# Patient Record
Sex: MALE | Race: BLACK OR AFRICAN AMERICAN | Employment: OTHER | URBAN - NONMETROPOLITAN AREA
[De-identification: names, ages, dates, MRNs, and addresses within clinical notes are randomized per-mention and may not be internally consistent; named-entity substitution may affect disease eponyms.]

---

## 2017-07-24 ENCOUNTER — APPOINTMENT (OUTPATIENT)
Dept: GENERAL RADIOLOGY | Age: 47
End: 2017-07-24
Payer: MEDICAID

## 2017-07-24 ENCOUNTER — HOSPITAL ENCOUNTER (EMERGENCY)
Age: 47
Discharge: HOME | End: 2017-07-24
Payer: MEDICAID

## 2017-07-24 VITALS
HEIGHT: 68 IN | BODY MASS INDEX: 22.73 KG/M2 | WEIGHT: 150 LBS | TEMPERATURE: 98.2 F | RESPIRATION RATE: 18 BRPM | OXYGEN SATURATION: 99 % | DIASTOLIC BLOOD PRESSURE: 86 MMHG | HEART RATE: 83 BPM | SYSTOLIC BLOOD PRESSURE: 156 MMHG
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
## 2017-07-24 DIAGNOSIS — M54.50 ACUTE RIGHT-SIDED LOW BACK PAIN WITHOUT SCIATICA: ICD-10-CM

## 2017-07-24 DIAGNOSIS — S83.421A SPRAIN OF LATERAL COLLATERAL LIGAMENT OF RIGHT KNEE, INITIAL ENCOUNTER: Primary | ICD-10-CM

## 2017-07-24 PROCEDURE — 6370000000 HC RX 637 (ALT 250 FOR IP): Performed by: PHYSICIAN ASSISTANT

## 2017-07-24 PROCEDURE — 99283 EMERGENCY DEPT VISIT LOW MDM: CPT

## 2017-07-24 PROCEDURE — 73564 X-RAY EXAM KNEE 4 OR MORE: CPT

## 2017-07-24 PROCEDURE — 72100 X-RAY EXAM L-S SPINE 2/3 VWS: CPT

## 2017-07-24 RX ORDER — TRAMADOL HYDROCHLORIDE 50 MG/1
50 TABLET ORAL ONCE
Status: COMPLETED | OUTPATIENT
Start: 2017-07-24 | End: 2017-07-24

## 2017-07-24 RX ORDER — CYCLOBENZAPRINE HCL 10 MG
10 TABLET ORAL 3 TIMES DAILY PRN
Qty: 12 TABLET | Refills: 0 | Status: SHIPPED | OUTPATIENT
Start: 2017-07-24 | End: 2019-01-10 | Stop reason: ALTCHOICE

## 2017-07-24 RX ORDER — ACETAMINOPHEN 325 MG/1
650 TABLET ORAL EVERY 6 HOURS PRN
Qty: 20 TABLET | Refills: 0 | Status: SHIPPED | OUTPATIENT
Start: 2017-07-24 | End: 2019-11-11 | Stop reason: SDUPTHER

## 2017-07-24 RX ADMIN — TRAMADOL HYDROCHLORIDE 50 MG: 50 TABLET, FILM COATED ORAL at 13:15

## 2017-07-24 ASSESSMENT — ENCOUNTER SYMPTOMS
STRIDOR: 0
EYE DISCHARGE: 0
DIARRHEA: 0
BACK PAIN: 1
COUGH: 0
EYE REDNESS: 0
SHORTNESS OF BREATH: 0
COLOR CHANGE: 0
RHINORRHEA: 0
FACIAL SWELLING: 0
ABDOMINAL DISTENTION: 0
NAUSEA: 0
VOMITING: 0
PHOTOPHOBIA: 0

## 2017-07-24 ASSESSMENT — PAIN DESCRIPTION - ORIENTATION: ORIENTATION: RIGHT;LOWER

## 2017-07-24 ASSESSMENT — PAIN DESCRIPTION - FREQUENCY: FREQUENCY: CONTINUOUS

## 2017-07-24 ASSESSMENT — PAIN DESCRIPTION - LOCATION: LOCATION: BACK;KNEE

## 2017-07-24 ASSESSMENT — PAIN SCALES - GENERAL
PAINLEVEL_OUTOF10: 8
PAINLEVEL_OUTOF10: 8

## 2017-07-24 ASSESSMENT — PAIN DESCRIPTION - PAIN TYPE: TYPE: ACUTE PAIN

## 2017-07-29 ASSESSMENT — ENCOUNTER SYMPTOMS: ABDOMINAL PAIN: 0

## 2017-09-20 ENCOUNTER — APPOINTMENT (OUTPATIENT)
Dept: GENERAL RADIOLOGY | Age: 47
End: 2017-09-20
Payer: MEDICAID

## 2017-09-20 ENCOUNTER — HOSPITAL ENCOUNTER (EMERGENCY)
Age: 47
Discharge: HOME | End: 2017-09-20
Attending: EMERGENCY MEDICINE
Payer: MEDICAID

## 2017-09-20 VITALS
HEIGHT: 68 IN | SYSTOLIC BLOOD PRESSURE: 154 MMHG | RESPIRATION RATE: 16 BRPM | WEIGHT: 150 LBS | HEART RATE: 87 BPM | BODY MASS INDEX: 22.73 KG/M2 | OXYGEN SATURATION: 100 % | TEMPERATURE: 97.9 F | DIASTOLIC BLOOD PRESSURE: 83 MMHG

## 2017-09-20 DIAGNOSIS — S93.402A SPRAIN OF LEFT ANKLE, UNSPECIFIED LIGAMENT, INITIAL ENCOUNTER: ICD-10-CM

## 2017-09-20 DIAGNOSIS — S20.211A RIB CONTUSION, RIGHT, INITIAL ENCOUNTER: ICD-10-CM
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
## 2017-09-20 PROCEDURE — 6370000000 HC RX 637 (ALT 250 FOR IP): Performed by: EMERGENCY MEDICINE

## 2017-09-20 PROCEDURE — 99284 EMERGENCY DEPT VISIT MOD MDM: CPT

## 2017-09-20 PROCEDURE — 73610 X-RAY EXAM OF ANKLE: CPT

## 2017-09-20 PROCEDURE — 71101 X-RAY EXAM UNILAT RIBS/CHEST: CPT

## 2017-09-20 RX ORDER — TRAMADOL HYDROCHLORIDE 50 MG/1
50 TABLET ORAL EVERY 6 HOURS PRN
Qty: 10 TABLET | Refills: 0 | Status: SHIPPED | OUTPATIENT
Start: 2017-09-20 | End: 2018-03-11 | Stop reason: DRUGHIGH

## 2017-09-20 RX ORDER — ACETAMINOPHEN 325 MG/1
650 TABLET ORAL ONCE
Status: COMPLETED | OUTPATIENT
Start: 2017-09-20 | End: 2017-09-20

## 2017-09-20 RX ADMIN — ACETAMINOPHEN 650 MG: 325 TABLET ORAL at 12:31

## 2017-09-20 ASSESSMENT — PAIN DESCRIPTION - LOCATION: LOCATION: BACK;ANKLE

## 2017-09-20 ASSESSMENT — PAIN SCALES - GENERAL: PAINLEVEL_OUTOF10: 8

## 2017-09-20 ASSESSMENT — PAIN DESCRIPTION - PAIN TYPE: TYPE: ACUTE PAIN

## 2017-11-28 ENCOUNTER — APPOINTMENT (OUTPATIENT)
Dept: GENERAL RADIOLOGY | Age: 47
End: 2017-11-28
Payer: MEDICAID

## 2017-11-28 ENCOUNTER — HOSPITAL ENCOUNTER (EMERGENCY)
Age: 47
Discharge: HOME | End: 2017-11-28
Payer: MEDICAID

## 2017-11-28 VITALS
BODY MASS INDEX: 22.73 KG/M2 | DIASTOLIC BLOOD PRESSURE: 82 MMHG | HEART RATE: 74 BPM | HEIGHT: 68 IN | OXYGEN SATURATION: 96 % | WEIGHT: 150 LBS | SYSTOLIC BLOOD PRESSURE: 133 MMHG | RESPIRATION RATE: 16 BRPM | TEMPERATURE: 98.2 F

## 2017-11-28 DIAGNOSIS — S90.851A FOREIGN BODY IN RIGHT FOOT, INITIAL ENCOUNTER: ICD-10-CM

## 2017-11-28 DIAGNOSIS — T14.8XXA PUNCTURE WOUND: Primary | ICD-10-CM

## 2017-11-28 DIAGNOSIS — S91.331A PUNCTURE WOUND OF RIGHT FOOT, INITIAL ENCOUNTER: ICD-10-CM

## 2017-11-28 PROCEDURE — 73620 X-RAY EXAM OF FOOT: CPT

## 2017-11-28 PROCEDURE — 99284 EMERGENCY DEPT VISIT MOD MDM: CPT

## 2017-11-28 PROCEDURE — 2500000003 HC RX 250 WO HCPCS

## 2017-11-28 PROCEDURE — 73650 X-RAY EXAM OF HEEL: CPT

## 2017-11-28 PROCEDURE — 10120 INC&RMVL FB SUBQ TISS SMPL: CPT

## 2017-11-28 PROCEDURE — L3260 AMBULATORY SURGICAL BOOT EAC: HCPCS

## 2017-11-28 PROCEDURE — 90715 TDAP VACCINE 7 YRS/> IM: CPT | Performed by: NURSE PRACTITIONER

## 2017-11-28 PROCEDURE — A6446 CONFORM BAND S W>=3" <5"/YD: HCPCS

## 2017-11-28 PROCEDURE — 90471 IMMUNIZATION ADMIN: CPT | Performed by: NURSE PRACTITIONER

## 2017-11-28 PROCEDURE — 6370000000 HC RX 637 (ALT 250 FOR IP): Performed by: NURSE PRACTITIONER

## 2017-11-28 PROCEDURE — 6360000002 HC RX W HCPCS: Performed by: NURSE PRACTITIONER

## 2017-11-28 PROCEDURE — 73630 X-RAY EXAM OF FOOT: CPT

## 2017-11-28 RX ORDER — CIPROFLOXACIN 500 MG/1
500 TABLET, FILM COATED ORAL 2 TIMES DAILY
Qty: 20 TABLET | Refills: 0 | Status: SHIPPED | OUTPATIENT
Start: 2017-11-28 | End: 2017-12-08

## 2017-11-28 RX ORDER — HYDROCODONE BITARTRATE AND ACETAMINOPHEN 5; 325 MG/1; MG/1
1 TABLET ORAL ONCE
Status: COMPLETED | OUTPATIENT
Start: 2017-11-28 | End: 2017-11-28

## 2017-11-28 RX ORDER — HYDROCODONE BITARTRATE AND ACETAMINOPHEN 5; 325 MG/1; MG/1
1 TABLET ORAL EVERY 6 HOURS PRN
Qty: 12 TABLET | Refills: 0 | Status: SHIPPED | OUTPATIENT
Start: 2017-11-28 | End: 2017-12-05

## 2017-11-28 RX ORDER — LIDOCAINE HYDROCHLORIDE AND EPINEPHRINE 10; 10 MG/ML; UG/ML
20 INJECTION, SOLUTION INFILTRATION; PERINEURAL ONCE
Status: COMPLETED | OUTPATIENT
Start: 2017-11-28 | End: 2017-11-28

## 2017-11-28 RX ORDER — LIDOCAINE HYDROCHLORIDE AND EPINEPHRINE 10; 10 MG/ML; UG/ML
INJECTION, SOLUTION INFILTRATION; PERINEURAL
Status: COMPLETED
Start: 2017-11-28 | End: 2017-11-28

## 2017-11-28 RX ADMIN — HYDROCODONE BITARTRATE AND ACETAMINOPHEN 1 TABLET: 5; 325 TABLET ORAL at 09:54

## 2017-11-28 RX ADMIN — LIDOCAINE HYDROCHLORIDE AND EPINEPHRINE: 10; 10 INJECTION, SOLUTION INFILTRATION; PERINEURAL at 12:30

## 2017-11-28 RX ADMIN — TETANUS TOXOID, REDUCED DIPHTHERIA TOXOID AND ACELLULAR PERTUSSIS VACCINE, ADSORBED 0.5 ML: 5; 2.5; 8; 8; 2.5 SUSPENSION INTRAMUSCULAR at 09:54

## 2017-11-28 ASSESSMENT — PAIN SCALES - GENERAL
PAINLEVEL_OUTOF10: 4
PAINLEVEL_OUTOF10: 8
PAINLEVEL_OUTOF10: 8

## 2017-11-28 ASSESSMENT — ENCOUNTER SYMPTOMS
RHINORRHEA: 0
CHEST TIGHTNESS: 0
SORE THROAT: 0
ABDOMINAL DISTENTION: 0
NAUSEA: 0
SHORTNESS OF BREATH: 0
BACK PAIN: 0
COUGH: 0
PHOTOPHOBIA: 0
VOICE CHANGE: 0
COLOR CHANGE: 0
ABDOMINAL PAIN: 0
SINUS PRESSURE: 0
EYE REDNESS: 0
BLOOD IN STOOL: 0
VOMITING: 0
CONSTIPATION: 0
DIARRHEA: 0
WHEEZING: 0

## 2017-11-28 ASSESSMENT — PAIN DESCRIPTION - DESCRIPTORS: DESCRIPTORS: STABBING;TENDER

## 2017-11-28 ASSESSMENT — PAIN DESCRIPTION - LOCATION: LOCATION: FOOT

## 2017-11-28 ASSESSMENT — PAIN DESCRIPTION - PAIN TYPE: TYPE: ACUTE PAIN

## 2017-11-28 ASSESSMENT — PAIN DESCRIPTION - ORIENTATION: ORIENTATION: RIGHT

## 2017-11-28 NOTE — CONSULTS
Podiatric Surgery Consult    CC:  Puncture wound right foot    HPI:  The patient is a 56 y.o. male with significant past medical history of GERD and HTN who being seen at bedside this afternoon. Patient also relates past history of DM type II, however he states that has resolved following significant weight loss. Patient relates that he stepped on a large metal screw last night. Relates that the screw went through his shoes and into his foot. States that at the time of the incident he pulled the screw out. Relates continued pain at the site of injury. Denies Drainage, redness, swelling, F,C,N,V, SOB,CP. Past Medical History:        Diagnosis Date    GERD (gastroesophageal reflux disease)     Headache(784.0)     Hyperlipidemia     Hypertension        Past Surgical History:        Procedure Laterality Date    MANDIBLE FRACTURE SURGERY         Current Medications:    Current Facility-Administered Medications: lidocaine-EPINEPHrine 1 percent-1:[id number] injection, , ,     Allergies:  Aspirin and Motrin [ibuprofen micronized]    Social History:    Relates 28 year pack history. Relates alcohol and marijuana use    Family History:       Problem Relation Age of Onset    Heart Disease Mother     Diabetes Mother     Hypertension Mother        REVIEW OF SYSTEMS:    Constitutional: Negative for fever, chills, and sudden weight loss or gain. HEENT: Negative for blurry/double vision, ears, nose, or throat pain. Negative for mass. Respiratory: Negative for shortness of breath or hemoptysis. Cardiovascular: Negative for angina, palpitations, or lower extremity edema. Gastrointestinal: Negative for nausea, vomiting, diarrhea, constipation, or abdominal pain. Genitourinary: Negative for increased urination, burning with urination, or hematuria. Musculoskeletal: See above HPI. Integument: See above HPI. Hematology: Negative for easy bruising or easy bleeding.     Physical Examination:  General: Awake, alert, and (gastroesophageal reflux disease)    Hyperlipidemia    Hypertension       Plan  1. Patient initially examined and evaluated. Puncture wound right heel  - pt received TBAP in ED  Foreign body right heel  - reviewed radiographs. Discuss planned bedside incision and drainage with removal of foreign body. Patient is in agreement and surgical consent signed. - Metallic marker placed on puncture site to allow triangulation of foreign body on x-ray. - 20cc of 1% lidocaine with epi used for field block  - right foot prepped in usual sterile manner. Linear incision made over puncture wound. Blunt dissection with hemostat down to the level of the foreign body. Small metallic foreign body removed. Surgical site flushed with copious amounts of sterile NS. Closed with 4.0 vicryl and 4.0 prolene. Dressed with betadine gauze, DSD and ace bandage  - d/c on cirpo  - NWB with crutches  - follow up with Dr. Ericka Torres in 1 week     Dr. Driss Mckeon thank you for the consultation, allowing podiatry to assist in the medical welfare of this patient. Podiatry will continue to follow this patient throughout the duration of hospitalization.      Amelia Cuba, PGY-2  Foot and Ankle Surgical Resident  11/28/2017 11:31 AM

## 2017-11-28 NOTE — ED NOTES
Patient presents to ED with wife. States he was outside in his neighbors yard and a piece that holds the fan blades together went into the heel of his foot. He pulled it out himself. There is a small hole on the bottom of his foot. Some blood noted on his socks but wound is not bleeding at this time. Patient resting in bed Dora Townsend NP to assess.       Rudy López RN  11/28/17 047

## 2017-11-28 NOTE — ED NOTES
Dr Annette Zamora doing a procedure in patient room to remove object      Alicia Gottlieb RN  11/28/17 6513

## 2017-11-28 NOTE — ED PROVIDER NOTES
[facility] Emergency Department    CHIEF COMPLAINT       Chief Complaint   Patient presents with    Foot Injury       Nurses Notes reviewed and I agree except as noted in the HPI. HISTORY OF PRESENT ILLNESS    Kelli Baker is a 56 y.o. male who presents to the ED for evaluation of Right foot pain. Patient apparently stepped on a metal object last evening. It went through the sole of his shoe and up into his foot. He pulled the metal object out. He states it's a bolt second next to a ceiling fan that was in his neighbor's yard. He had mild bleeding after this was removed but no bleeding is noted currently. He denies any fever. He does have a significant medical history that includes diabetes. But he states he has been taken off of his antidiabetic medicines. He notes he has significant pain to the base of his foot in the medial aspect of his foot. He has no significant decreased range of motion. He has taken no medicines for this. He denies chest pain, shortness of breath, nausea, vomiting, headache, lightheadedness. Pain description:  Onset: Yesterday  Location: Right foot  Duration: Constant  Character: Sharp/soreness  Aggravating factors: Movement and touch  Radiation: Up for  Severity: 8 out of 10    Experienced previously: Denies    HPI was provided by the patient. REVIEW OF SYSTEMS     Review of Systems   Constitutional: Negative for appetite change, chills, diaphoresis, fatigue, fever and unexpected weight change. HENT: Negative for congestion, hearing loss, postnasal drip, rhinorrhea, sinus pressure, sore throat and voice change. Eyes: Negative for photophobia, redness and visual disturbance. Respiratory: Negative for cough, chest tightness, shortness of breath and wheezing. Cardiovascular: Negative for chest pain and palpitations. Gastrointestinal: Negative for abdominal distention, abdominal pain, blood in stool, constipation, diarrhea, nausea and vomiting. Genitourinary: Negative for decreased urine volume, difficulty urinating, dysuria, flank pain and frequency. Musculoskeletal: Positive for arthralgias (right ankle). Negative for back pain, gait problem, joint swelling, neck pain and neck stiffness. Skin: Negative for color change and rash. Allergic/Immunologic: Negative for immunocompromised state. Neurological: Negative for dizziness, tremors, weakness, light-headedness, numbness and headaches. Hematological: Does not bruise/bleed easily. Psychiatric/Behavioral: Negative for behavioral problems, confusion and decreased concentration. The patient is not nervous/anxious. PAST MEDICAL HISTORY    has a past medical history of GERD (gastroesophageal reflux disease); Headache(784.0); Hyperlipidemia; and Hypertension. SURGICAL HISTORY      has a past surgical history that includes Mandible fracture surgery. CURRENT MEDICATIONS       Discharge Medication List as of 11/28/2017  1:05 PM      CONTINUE these medications which have NOT CHANGED    Details   traMADol (ULTRAM) 50 MG tablet Take 1 tablet by mouth every 6 hours as needed for Pain, Disp-10 tablet, R-0Print      cyclobenzaprine (FLEXERIL) 10 MG tablet Take 1 tablet by mouth 3 times daily as needed for Muscle spasms, Disp-12 tablet, R-0Print      acetaminophen (TYLENOL) 325 MG tablet Take 2 tablets by mouth every 6 hours as needed for Pain, Disp-20 tablet, R-0Print      !! ONE TOUCH ULTRA TEST strip TEST once daily, Disp-100 strip, R-3Normal      lidocaine (LIDODERM) 5 % Place 1 patch onto the skin every 24 hours 12 hours on, 12 hours off., Disp-6 patch, R-0      loratadine (CLARITIN) 10 MG tablet Take 1 tablet by mouth daily, Disp-14 tablet, R-0      omeprazole (PRILOSEC) 20 MG delayed release capsule take 1 capsule by mouth once daily, Disp-30 capsule, R-5      Skin Protectants, Misc. (EUCERIN) cream Apply topically as needed. , Disp-1 Package, R-3, Normal      atorvastatin (LIPITOR) 40 MG tablet take 1 tablet by mouth once daily, Disp-30 tablet, R-6      lisinopril (PRINIVIL;ZESTRIL) 10 MG tablet take 1 tablet by mouth once daily, Disp-30 tablet, R-3      !! ACCU-CHEK TRIXIE PLUS strip take as directed once daily and BEFORE MEALS IF NEEDED, Disp-100 strip, R-3DX E11.9       ! ! - Potential duplicate medications found. Please discuss with provider. ALLERGIES     is allergic to aspirin and motrin [ibuprofen micronized]. FAMILY HISTORY     indicated that his mother is alive. He indicated that his father is alive. He indicated that the status of his sister is unknown.    family history includes Diabetes in his mother; Heart Disease in his mother; Hypertension in his mother. SOCIAL HISTORY      reports that he has been smoking Cigarettes. He has a 5.60 pack-year smoking history. He has never used smokeless tobacco. He reports that he drinks alcohol. He reports that he uses drugs, including Marijuana. PHYSICAL EXAM     INITIAL VITALS:  height is 5' 8\" (1.727 m) and weight is 150 lb (68 kg). His oral temperature is 98.2 °F (36.8 °C). His blood pressure is 133/82 and his pulse is 74. His respiration is 16 and oxygen saturation is 96%. Physical Exam   Constitutional: He is oriented to person, place, and time. He appears well-developed and well-nourished. HENT:   Head: Normocephalic. Nose: Nose normal.   Mouth/Throat: Uvula is midline and oropharynx is clear and moist.   Eyes: Conjunctivae and EOM are normal. Pupils are equal, round, and reactive to light. Neck: Normal range of motion. Neck supple. Cardiovascular: Normal rate, regular rhythm, S1 normal, S2 normal, normal heart sounds and intact distal pulses. Pulmonary/Chest: Effort normal and breath sounds normal. No respiratory distress. He exhibits no tenderness. Abdominal: Soft. Normal appearance and bowel sounds are normal. He exhibits no distension. There is no tenderness. Musculoskeletal: Normal range of motion. recognition software. It may contain minor errors which are inherent in voice recognition technology. **      Final report electronically signed by Dr. Fareed Buchanan on 11/28/2017 11:48 AM      XR FOOT RIGHT (MIN 3 VIEWS)   Final Result   4 x 1 mm linear density projected along the plantar aspect of the plantar fascia, best demonstrated on the lateral view, of uncertain etiology or significance but not present on the previous study of 7/15/2009. **This report has been created using voice recognition software. It may contain minor errors which are inherent in voice recognition technology. **      Final report electronically signed by Dr. Fareed Buchanan on 11/28/2017 10:11 AM            LABS:   Labs Reviewed - No data to display    EMERGENCY DEPARTMENT COURSE:   Vitals:    Vitals:    11/28/17 0943   BP: 133/82   Pulse: 74   Resp: 16   Temp: 98.2 °F (36.8 °C)   TempSrc: Oral   SpO2: 96%   Weight: 150 lb (68 kg)   Height: 5' 8\" (1.727 m)         MDM    Patient seen and evaluated in the emergency department, patient appears to be in no acute distress. His pulse wound noted to the right foot noted at the sole of the foot. There is edema noted tracking up into the medial malleolus. He has no significant decreased range of motion of the foot, sensory and motor is intact. X-ray was obtained of the foot to rule out retained foreign body. Xray shows possible Foreign body in the plantar fascia. I discussed the case with Dr. Alejandra Sanchez of the podiatry service he had one of his residents come down and evaluate the patient. They removed the foreign body. Patient was given crutches, pain medicine, and started on Cipro for Pseudomonas coverage as well. They are advised to follow-up with Dr. Alejandra Sanchez and the next week. All here in the emergency room and maintained stable course appropriate for discharge.         Medications   HYDROcodone-acetaminophen (NORCO) 5-325 MG per tablet 1 tablet (1 tablet Oral Given 11/28/17

## 2017-11-29 ENCOUNTER — HOSPITAL ENCOUNTER (EMERGENCY)
Age: 47
Discharge: HOME | End: 2017-11-29
Payer: MEDICAID

## 2017-11-29 VITALS
TEMPERATURE: 97.9 F | HEART RATE: 88 BPM | RESPIRATION RATE: 12 BRPM | DIASTOLIC BLOOD PRESSURE: 77 MMHG | BODY MASS INDEX: 21.48 KG/M2 | HEIGHT: 69 IN | WEIGHT: 145 LBS | SYSTOLIC BLOOD PRESSURE: 145 MMHG | OXYGEN SATURATION: 100 %

## 2017-11-29 DIAGNOSIS — Z51.89 ENCOUNTER FOR WOUND RE-CHECK: Primary | ICD-10-CM

## 2017-11-29 PROCEDURE — A6446 CONFORM BAND S W>=3" <5"/YD: HCPCS

## 2017-11-29 PROCEDURE — 99282 EMERGENCY DEPT VISIT SF MDM: CPT

## 2017-11-29 PROCEDURE — A6252 ABSORPT DRG >16 <=48 W/O BDR: HCPCS

## 2017-11-29 PROCEDURE — A6402 STERILE GAUZE <= 16 SQ IN: HCPCS

## 2017-11-29 ASSESSMENT — ENCOUNTER SYMPTOMS
SINUS PRESSURE: 0
PHOTOPHOBIA: 0
COLOR CHANGE: 0
VOMITING: 0
RHINORRHEA: 0
NAUSEA: 0
ABDOMINAL DISTENTION: 0
VOICE CHANGE: 0
EYE REDNESS: 0
COUGH: 0
SORE THROAT: 0
DIARRHEA: 0
SHORTNESS OF BREATH: 0
CHEST TIGHTNESS: 0
BLOOD IN STOOL: 0
WHEEZING: 0
BACK PAIN: 0
CONSTIPATION: 0
ABDOMINAL PAIN: 0

## 2017-11-29 ASSESSMENT — PAIN DESCRIPTION - LOCATION: LOCATION: FOOT

## 2017-11-29 ASSESSMENT — PAIN DESCRIPTION - ORIENTATION: ORIENTATION: RIGHT

## 2017-11-29 ASSESSMENT — PAIN DESCRIPTION - PAIN TYPE: TYPE: ACUTE PAIN

## 2017-11-29 ASSESSMENT — PAIN SCALES - GENERAL: PAINLEVEL_OUTOF10: 7

## 2017-11-29 NOTE — ED PROVIDER NOTES
[address] [facility]       Chief Complaint   Patient presents with    Wound Check       Nurses Notes reviewed and I agree except as noted in the HPI. HISTORY OF PRESENT ILLNESS    Manasa Oneil is a 56 y.o. male who presents to the ED for evaluation of wound check. Patient notes that he recently stepped on metal object which resulted in him having a foreign body in his right foot, which was removed via podiatry. Patient notes that he has been having increasing drainage from his wound, and subsequently came back to the ED for evaluation. Patient notes that he has been taking his antibiotic as directed. Denies new pain. Patient denies any fevers, chills, nausea, vomiting or diarrhea. Patient denies any chest pain or shortness of breath. Denies numbness, tingling, or paraesthesias. Denies any pain. Patient denies any further complaints at initial encounter. HPI was provided by the patient. REVIEW OF SYSTEMS     Review of Systems   Constitutional: Negative for appetite change, chills, diaphoresis, fatigue, fever and unexpected weight change. HENT: Negative for congestion, hearing loss, postnasal drip, rhinorrhea, sinus pressure, sore throat and voice change. Eyes: Negative for photophobia, redness and visual disturbance. Respiratory: Negative for cough, chest tightness, shortness of breath and wheezing. Cardiovascular: Negative for chest pain and palpitations. Gastrointestinal: Negative for abdominal distention, abdominal pain, blood in stool, constipation, diarrhea, nausea and vomiting. Endocrine: Negative for cold intolerance, heat intolerance, polydipsia, polyphagia and polyuria. Genitourinary: Negative for decreased urine volume, difficulty urinating, dysuria, flank pain and frequency. Musculoskeletal: Negative for arthralgias, back pain, gait problem, joint swelling, neck pain and neck stiffness. Skin: Positive for wound (Drainage).  Negative for atorvastatin (LIPITOR) 40 MG tablet take 1 tablet by mouth once daily, Disp-30 tablet, R-6      lisinopril (PRINIVIL;ZESTRIL) 10 MG tablet take 1 tablet by mouth once daily, Disp-30 tablet, R-3      !! ACCU-CHEK TRIXIE PLUS strip take as directed once daily and BEFORE MEALS IF NEEDED, Disp-100 strip, R-3DX E11.9       ! ! - Potential duplicate medications found. Please discuss with provider. ALLERGIES     is allergic to aspirin and motrin [ibuprofen micronized]. FAMILY HISTORY     indicated that his mother is alive. He indicated that his father is alive. He indicated that the status of his sister is unknown.    family history includes Diabetes in his mother; Heart Disease in his mother; Hypertension in his mother. SOCIAL HISTORY      reports that he has been smoking Cigarettes. He has a 5.60 pack-year smoking history. He has never used smokeless tobacco. He reports that he drinks alcohol. He reports that he uses drugs, including Marijuana. PHYSICAL EXAM     INITIAL VITALS:  height is 5' 9\" (1.753 m) and weight is 145 lb (65.8 kg). His oral temperature is 97.9 °F (36.6 °C). His blood pressure is 145/77 (abnormal) and his pulse is 88. His respiration is 12 and oxygen saturation is 100%. Physical Exam   Constitutional: He is oriented to person, place, and time. He appears well-developed and well-nourished. HENT:   Head: Normocephalic. Right Ear: External ear normal.   Left Ear: External ear normal.   Nose: Nose normal.   Mouth/Throat: Uvula is midline and oropharynx is clear and moist.   Eyes: Conjunctivae and EOM are normal. Pupils are equal, round, and reactive to light. Neck: Normal range of motion. Neck supple. Cardiovascular: Normal rate, regular rhythm, S1 normal, S2 normal, normal heart sounds and intact distal pulses. Pulmonary/Chest: Effort normal and breath sounds normal. No respiratory distress. He exhibits no tenderness. Abdominal: Soft.  Normal appearance and bowel sounds follow up with Dr. Mook Donnelly as discussed. Anticipatory guidance was given. The patient is instructed to return to the emergency department for any worsening or otherwise change in their symptoms. Patient discharged from the emergency department in good condition with all questions answered. See disposition below. CRITICAL CARE:   None    CONSULTS:  None    PROCEDURES:  None    FINAL IMPRESSION      1. Encounter for wound re-check          DISPOSITION/PLAN   Patient was discharged in stable condition. Will return if symptoms change or worsen, or for any sign or symptom deemed emergent by the patient or family members. Follow up as an outpatient, sooner if symptoms warrant. PATIENT REFERRED TO:  PRAKASH Sanchez [address]  [facility] [address] [facility]  [phone]    Go to   For your scheduled appointment      DISCHARGE MEDICATIONS:  Discharge Medication List as of 11/29/2017 12:48 PM          (Please note that portions of this note were completed with a voice recognition program.  Efforts were made to edit the dictations but occasionally words are mis-transcribed.)    Scribe: Tyrone Reynolds 11/29/17 12:10 PM Scribing for and in the presence of Steven Carpenter CNP. Signed by: Clyde Paredes, 11/29/17 5:14 PM    Provider:  I personally performed the services described in the documentation, reviewed and edited the documentation which was dictated to the scribe in my presence, and it accurately records my words and actions.     Steven Carpenter CNP 11/29/17 5:14 PM    BENNETT Zuleta NP  11/29/17 1524

## 2018-03-11 ENCOUNTER — APPOINTMENT (OUTPATIENT)
Dept: CT IMAGING | Age: 48
End: 2018-03-11
Payer: MEDICAID

## 2018-03-11 ENCOUNTER — HOSPITAL ENCOUNTER (EMERGENCY)
Age: 48
Discharge: HOME | End: 2018-03-11
Attending: FAMILY MEDICINE
Payer: MEDICAID

## 2018-03-11 ENCOUNTER — APPOINTMENT (OUTPATIENT)
Dept: GENERAL RADIOLOGY | Age: 48
End: 2018-03-11
Payer: MEDICAID

## 2018-03-11 VITALS
OXYGEN SATURATION: 100 % | RESPIRATION RATE: 16 BRPM | BODY MASS INDEX: 21.48 KG/M2 | HEIGHT: 69 IN | HEART RATE: 77 BPM | WEIGHT: 145 LBS | SYSTOLIC BLOOD PRESSURE: 152 MMHG | TEMPERATURE: 97.9 F | DIASTOLIC BLOOD PRESSURE: 91 MMHG

## 2018-03-11 DIAGNOSIS — Z87.898 HISTORY OF NIGHT SWEATS: ICD-10-CM

## 2018-03-11 DIAGNOSIS — S46.911A STRAIN OF RIGHT SHOULDER, INITIAL ENCOUNTER: ICD-10-CM

## 2018-03-11 DIAGNOSIS — M79.671 RIGHT FOOT PAIN: Primary | ICD-10-CM

## 2018-03-11 LAB
ALBUMIN SERPL-MCNC: 4.4 G/DL (ref 3.5–5.1)
ALP BLD-CCNC: 129 U/L (ref 38–126)
ALT SERPL-CCNC: 18 U/L (ref 11–66)
ANION GAP SERPL CALCULATED.3IONS-SCNC: 14 MEQ/L (ref 8–16)
ANISOCYTOSIS: ABNORMAL
AST SERPL-CCNC: 24 U/L (ref 5–40)
BASOPHILS # BLD: 1 %
BASOPHILS ABSOLUTE: 0.1 THOU/MM3 (ref 0–0.1)
BILIRUB SERPL-MCNC: 0.2 MG/DL (ref 0.3–1.2)
BILIRUBIN DIRECT: < 0.2 MG/DL (ref 0–0.3)
BUN BLDV-MCNC: 17 MG/DL (ref 7–22)
CALCIUM SERPL-MCNC: 9.4 MG/DL (ref 8.5–10.5)
CHLORIDE BLD-SCNC: 97 MEQ/L (ref 98–111)
CO2: 26 MEQ/L (ref 23–33)
CREAT SERPL-MCNC: 1.1 MG/DL (ref 0.4–1.2)
EOSINOPHIL # BLD: 1.7 %
EOSINOPHILS ABSOLUTE: 0.1 THOU/MM3 (ref 0–0.4)
GFR SERPL CREATININE-BSD FRML MDRD: 87 ML/MIN/1.73M2
GLUCOSE BLD-MCNC: 94 MG/DL (ref 70–108)
HCT VFR BLD CALC: 41.9 % (ref 42–52)
HEMOGLOBIN: 13.5 GM/DL (ref 14–18)
HYPOCHROMIA: ABNORMAL
LACTIC ACID: 1.7 MMOL/L (ref 0.5–2.2)
LYMPHOCYTES # BLD: 20.2 %
LYMPHOCYTES ABSOLUTE: 1.5 THOU/MM3 (ref 1–4.8)
MCH RBC QN AUTO: 25.1 PG (ref 27–31)
MCHC RBC AUTO-ENTMCNC: 32.3 GM/DL (ref 33–37)
MCV RBC AUTO: 77.7 FL (ref 80–94)
MICROCYTES: ABNORMAL
MONOCYTES # BLD: 8.8 %
MONOCYTES ABSOLUTE: 0.6 THOU/MM3 (ref 0.4–1.3)
NUCLEATED RED BLOOD CELLS: 0 /100 WBC
OSMOLALITY CALCULATION: 275.1 MOSMOL/KG (ref 275–300)
PDW BLD-RTO: 16.1 % (ref 11.5–14.5)
PLATELET # BLD: 326 THOU/MM3 (ref 130–400)
PMV BLD AUTO: 8.1 FL (ref 7.4–10.4)
POTASSIUM SERPL-SCNC: 4.6 MEQ/L (ref 3.5–5.2)
PROCALCITONIN: 0.03 NG/ML (ref 0.01–0.09)
RBC # BLD: 5.4 MILL/MM3 (ref 4.7–6.1)
SEG NEUTROPHILS: 68.3 %
SEGMENTED NEUTROPHILS ABSOLUTE COUNT: 4.9 THOU/MM3 (ref 1.8–7.7)
SODIUM BLD-SCNC: 137 MEQ/L (ref 135–145)
TOTAL PROTEIN: 7.2 G/DL (ref 6.1–8)
WBC # BLD: 7.2 THOU/MM3 (ref 4.8–10.8)

## 2018-03-11 PROCEDURE — 85025 COMPLETE CBC W/AUTO DIFF WBC: CPT

## 2018-03-11 PROCEDURE — 73630 X-RAY EXAM OF FOOT: CPT

## 2018-03-11 PROCEDURE — 84145 PROCALCITONIN (PCT): CPT

## 2018-03-11 PROCEDURE — 87040 BLOOD CULTURE FOR BACTERIA: CPT

## 2018-03-11 PROCEDURE — 99284 EMERGENCY DEPT VISIT MOD MDM: CPT

## 2018-03-11 PROCEDURE — 36415 COLL VENOUS BLD VENIPUNCTURE: CPT

## 2018-03-11 PROCEDURE — 80053 COMPREHEN METABOLIC PANEL: CPT

## 2018-03-11 PROCEDURE — 83605 ASSAY OF LACTIC ACID: CPT

## 2018-03-11 PROCEDURE — 6370000000 HC RX 637 (ALT 250 FOR IP): Performed by: FAMILY MEDICINE

## 2018-03-11 PROCEDURE — 73200 CT UPPER EXTREMITY W/O DYE: CPT

## 2018-03-11 PROCEDURE — 82248 BILIRUBIN DIRECT: CPT

## 2018-03-11 PROCEDURE — L3260 AMBULATORY SURGICAL BOOT EAC: HCPCS

## 2018-03-11 RX ORDER — TRAMADOL HYDROCHLORIDE 50 MG/1
50 TABLET ORAL ONCE
Status: COMPLETED | OUTPATIENT
Start: 2018-03-11 | End: 2018-03-11

## 2018-03-11 RX ORDER — TRAMADOL HYDROCHLORIDE 50 MG/1
50 TABLET ORAL EVERY 8 HOURS PRN
Qty: 15 TABLET | Refills: 0 | Status: SHIPPED | OUTPATIENT
Start: 2018-03-11 | End: 2018-03-16

## 2018-03-11 RX ADMIN — TRAMADOL HYDROCHLORIDE 50 MG: 50 TABLET, FILM COATED ORAL at 11:23

## 2018-03-11 ASSESSMENT — ENCOUNTER SYMPTOMS
SHORTNESS OF BREATH: 0
VOMITING: 0
NAUSEA: 0
ABDOMINAL PAIN: 0
COUGH: 0
DIARRHEA: 0
WHEEZING: 0
STRIDOR: 0
CHEST TIGHTNESS: 0
BACK PAIN: 0
COLOR CHANGE: 0

## 2018-03-11 ASSESSMENT — PAIN DESCRIPTION - LOCATION: LOCATION: FOOT;SHOULDER

## 2018-03-11 ASSESSMENT — PAIN SCALES - GENERAL: PAINLEVEL_OUTOF10: 7

## 2018-03-11 ASSESSMENT — PAIN DESCRIPTION - PAIN TYPE: TYPE: ACUTE PAIN

## 2018-03-11 ASSESSMENT — PAIN DESCRIPTION - ORIENTATION: ORIENTATION: RIGHT
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
## 2018-03-11 NOTE — ED NOTES
Patient updated on plan of care. Patient returned from [address] [place] [address] and post op boot placed on patient.       608 Radha KONG RN  03/11/18 3314

## 2018-03-11 NOTE — ED NOTES
Patient presents to the ED with girlfriend. Patient states his right foot hurts from when he had a nail removed in his foot. Patient states recently he fell and has shoulder pain from falling in the past and thinks he needs a MRI.       [address] [place] [address] JEANNIE KONG  03/11/18 8999

## 2018-03-17 LAB
BLOOD CULTURE, ROUTINE: NORMAL
BLOOD CULTURE, ROUTINE: NORMAL

## 2018-05-24 ENCOUNTER — HOSPITAL ENCOUNTER (EMERGENCY)
Age: 48
Discharge: HOME | End: 2018-05-24
Payer: MEDICAID

## 2018-05-24 ENCOUNTER — APPOINTMENT (OUTPATIENT)
Dept: GENERAL RADIOLOGY | Age: 48
End: 2018-05-24
Payer: MEDICAID

## 2018-05-24 VITALS
HEIGHT: 69 IN | TEMPERATURE: 97.9 F | SYSTOLIC BLOOD PRESSURE: 176 MMHG | BODY MASS INDEX: 22.96 KG/M2 | OXYGEN SATURATION: 98 % | RESPIRATION RATE: 18 BRPM | DIASTOLIC BLOOD PRESSURE: 99 MMHG | WEIGHT: 155 LBS | HEART RATE: 85 BPM

## 2018-05-24 DIAGNOSIS — S46.911A STRAIN OF RIGHT SHOULDER, INITIAL ENCOUNTER: Primary | ICD-10-CM

## 2018-05-24 PROCEDURE — 99283 EMERGENCY DEPT VISIT LOW MDM: CPT

## 2018-05-24 PROCEDURE — A4565 SLINGS: HCPCS

## 2018-05-24 PROCEDURE — 73030 X-RAY EXAM OF SHOULDER: CPT

## 2018-05-24 PROCEDURE — 6370000000 HC RX 637 (ALT 250 FOR IP): Performed by: NURSE PRACTITIONER

## 2018-05-24 RX ORDER — NAPROXEN 500 MG/1
500 TABLET ORAL 2 TIMES DAILY WITH MEALS
Qty: 30 TABLET | Refills: 0 | Status: ON HOLD | OUTPATIENT
Start: 2018-05-24 | End: 2022-02-02 | Stop reason: HOSPADM

## 2018-05-24 RX ORDER — HYDROCODONE BITARTRATE AND ACETAMINOPHEN 5; 325 MG/1; MG/1
1 TABLET ORAL ONCE
Status: COMPLETED | OUTPATIENT
Start: 2018-05-24 | End: 2018-05-24

## 2018-05-24 RX ADMIN — HYDROCODONE BITARTRATE AND ACETAMINOPHEN 1 TABLET: 5; 325 TABLET ORAL at 13:21

## 2018-05-24 ASSESSMENT — ENCOUNTER SYMPTOMS
SORE THROAT: 0
VOMITING: 0
ABDOMINAL PAIN: 0
SHORTNESS OF BREATH: 0
EYE DISCHARGE: 0
COUGH: 0
BACK PAIN: 0
WHEEZING: 0
NAUSEA: 0
DIARRHEA: 0
EYE REDNESS: 0
RHINORRHEA: 0

## 2018-05-24 ASSESSMENT — PAIN DESCRIPTION - LOCATION: LOCATION: SHOULDER

## 2018-05-24 ASSESSMENT — PAIN DESCRIPTION - ORIENTATION: ORIENTATION: RIGHT

## 2018-05-24 ASSESSMENT — PAIN SCALES - GENERAL
PAINLEVEL_OUTOF10: 10
PAINLEVEL_OUTOF10: 10

## 2018-05-24 ASSESSMENT — ACTIVITIES OF DAILY LIVING (ADL): EFFECT OF PAIN ON DAILY ACTIVITIES: WORSE WITH MOVEMENT

## 2018-05-24 ASSESSMENT — PAIN DESCRIPTION - PAIN TYPE: TYPE: ACUTE PAIN

## 2018-05-24 ASSESSMENT — PAIN DESCRIPTION - FREQUENCY: FREQUENCY: CONTINUOUS

## 2018-12-06 ENCOUNTER — HOSPITAL ENCOUNTER (EMERGENCY)
Age: 48
Discharge: HOME | End: 2018-12-06
Payer: MEDICAID

## 2018-12-06 ENCOUNTER — APPOINTMENT (OUTPATIENT)
Dept: GENERAL RADIOLOGY | Age: 48
End: 2018-12-06
Payer: MEDICAID

## 2018-12-06 VITALS
RESPIRATION RATE: 14 BRPM | HEART RATE: 96 BPM | HEIGHT: 69 IN | OXYGEN SATURATION: 99 % | TEMPERATURE: 97.9 F | WEIGHT: 155 LBS | DIASTOLIC BLOOD PRESSURE: 96 MMHG | BODY MASS INDEX: 22.96 KG/M2 | SYSTOLIC BLOOD PRESSURE: 171 MMHG

## 2018-12-06 DIAGNOSIS — R03.0 ELEVATED BLOOD PRESSURE READING: ICD-10-CM

## 2018-12-06 DIAGNOSIS — S39.012A BACK STRAIN, INITIAL ENCOUNTER: ICD-10-CM

## 2018-12-06 DIAGNOSIS — M25.511 ACUTE PAIN OF RIGHT SHOULDER: Primary | ICD-10-CM

## 2018-12-06 PROCEDURE — 6370000000 HC RX 637 (ALT 250 FOR IP): Performed by: EMERGENCY MEDICINE

## 2018-12-06 PROCEDURE — 73030 X-RAY EXAM OF SHOULDER: CPT

## 2018-12-06 PROCEDURE — 99283 EMERGENCY DEPT VISIT LOW MDM: CPT

## 2018-12-06 RX ORDER — CYCLOBENZAPRINE HCL 10 MG
10 TABLET ORAL ONCE
Status: COMPLETED | OUTPATIENT
Start: 2018-12-06 | End: 2018-12-06

## 2018-12-06 RX ORDER — ACETAMINOPHEN 500 MG
1000 TABLET ORAL ONCE
Status: COMPLETED | OUTPATIENT
Start: 2018-12-06 | End: 2018-12-06

## 2018-12-06 RX ADMIN — ACETAMINOPHEN 1000 MG: 500 TABLET, FILM COATED ORAL at 14:25

## 2018-12-06 RX ADMIN — CYCLOBENZAPRINE HYDROCHLORIDE 10 MG: 10 TABLET, FILM COATED ORAL at 14:25

## 2018-12-06 ASSESSMENT — PAIN SCALES - GENERAL: PAINLEVEL_OUTOF10: 8

## 2018-12-06 ASSESSMENT — ENCOUNTER SYMPTOMS
VOMITING: 0
DIARRHEA: 0
SHORTNESS OF BREATH: 0
BACK PAIN: 1
EYE DISCHARGE: 0
NAUSEA: 0
COUGH: 0
RHINORRHEA: 0
ABDOMINAL PAIN: 0
EYE REDNESS: 0
SORE THROAT: 0
WHEEZING: 0

## 2018-12-06 ASSESSMENT — PAIN DESCRIPTION - PAIN TYPE: TYPE: ACUTE PAIN

## 2018-12-06 ASSESSMENT — PAIN DESCRIPTION - LOCATION: LOCATION: SHOULDER

## 2018-12-06 ASSESSMENT — PAIN DESCRIPTION - FREQUENCY: FREQUENCY: CONTINUOUS

## 2018-12-06 ASSESSMENT — PAIN DESCRIPTION - DESCRIPTORS: DESCRIPTORS: ACHING

## 2018-12-06 ASSESSMENT — PAIN DESCRIPTION - ORIENTATION: ORIENTATION: RIGHT

## 2018-12-06 NOTE — ED PROVIDER NOTES
[facility]  eMERGENCY dEPARTMENT eNCOUnter          [address] [facility]       Chief Complaint   Patient presents with    Shoulder Pain     right    Back Pain     lower       Nurses Notes reviewed and I agree except as noted in the HPI. HISTORY OF PRESENT ILLNESS    Siena Birmingham is a 53 y.o. male who presents to the Emergency Department for the evaluation of right should and lower back pain. Patient states he slipped and fell 1 week ago. There was no pain directly after the fall, but patient reports pain the next morning. The pain has since gotten progressively worse. Patient states it feels as thought his shoulder \"locks up\" with some movements. Patient denies incontinence, fever, and IV drug use. There are no other complaints at this time. The HPI was provided by the patient. REVIEW OF SYSTEMS     Review of Systems   Constitutional: Negative for appetite change, chills, fatigue and fever. HENT: Negative for congestion, ear pain, rhinorrhea and sore throat. Eyes: Negative for discharge, redness and visual disturbance. Respiratory: Negative for cough, shortness of breath and wheezing. Cardiovascular: Negative for chest pain, palpitations and leg swelling. Gastrointestinal: Negative for abdominal pain, diarrhea, nausea and vomiting. Genitourinary: Negative for decreased urine volume, difficulty urinating, dysuria and hematuria. Musculoskeletal: Positive for back pain (lower) and myalgias (right shoulder). Negative for arthralgias, joint swelling and neck pain. Skin: Negative for pallor and rash. Allergic/Immunologic: Negative for environmental allergies. Neurological: Negative for dizziness, syncope, weakness, light-headedness, numbness and headaches. Hematological: Negative for adenopathy. Psychiatric/Behavioral: Negative for confusion and suicidal ideas. The patient is not nervous/anxious.         PAST MEDICAL HISTORY    has a past medical history of GERD (gastroesophageal reflux disease); Headache(784.0); Hyperlipidemia; and Hypertension. SURGICAL HISTORY      has a past surgical history that includes Mandible fracture surgery. CURRENT MEDICATIONS       Discharge Medication List as of 12/6/2018  2:44 PM      CONTINUE these medications which have NOT CHANGED    Details   naproxen (NAPROSYN) 500 MG tablet Take 1 tablet by mouth 2 times daily (with meals) for 30 doses, Disp-30 tablet, R-0Print      cyclobenzaprine (FLEXERIL) 10 MG tablet Take 1 tablet by mouth 3 times daily as needed for Muscle spasms, Disp-12 tablet, R-0Print      acetaminophen (TYLENOL) 325 MG tablet Take 2 tablets by mouth every 6 hours as needed for Pain, Disp-20 tablet, R-0Print      !! ONE TOUCH ULTRA TEST strip TEST once daily, Disp-100 strip, R-3Normal      lidocaine (LIDODERM) 5 % Place 1 patch onto the skin every 24 hours 12 hours on, 12 hours off., Disp-6 patch, R-0      loratadine (CLARITIN) 10 MG tablet Take 1 tablet by mouth daily, Disp-14 tablet, R-0      omeprazole (PRILOSEC) 20 MG delayed release capsule take 1 capsule by mouth once daily, Disp-30 capsule, R-5      Skin Protectants, Misc. (EUCERIN) cream Apply topically as needed. , Disp-1 Package, R-3, Normal      atorvastatin (LIPITOR) 40 MG tablet take 1 tablet by mouth once daily, Disp-30 tablet, R-6      lisinopril (PRINIVIL;ZESTRIL) 10 MG tablet take 1 tablet by mouth once daily, Disp-30 tablet, R-3      !! ACCU-CHEK TRIXIE PLUS strip take as directed once daily and BEFORE MEALS IF NEEDED, Disp-100 strip, R-3DX E11.9       ! ! - Potential duplicate medications found. Please discuss with provider. ALLERGIES     is allergic to aspirin and motrin [ibuprofen micronized]. FAMILY HISTORY     indicated that his mother is alive. He indicated that his father is alive.  He indicated that the status of his sister is unknown.    family history includes Diabetes in his mother; Heart Disease in his mother; Hypertension in his

## 2018-12-27 ENCOUNTER — HOSPITAL ENCOUNTER (EMERGENCY)
Age: 48
Discharge: HOME | End: 2018-12-27
Payer: MEDICAID

## 2018-12-27 ENCOUNTER — APPOINTMENT (OUTPATIENT)
Dept: GENERAL RADIOLOGY | Age: 48
End: 2018-12-27
Payer: MEDICAID

## 2018-12-27 VITALS
DIASTOLIC BLOOD PRESSURE: 82 MMHG | HEART RATE: 83 BPM | OXYGEN SATURATION: 96 % | HEIGHT: 69 IN | RESPIRATION RATE: 18 BRPM | TEMPERATURE: 98 F | WEIGHT: 176 LBS | BODY MASS INDEX: 26.07 KG/M2 | SYSTOLIC BLOOD PRESSURE: 153 MMHG

## 2018-12-27 DIAGNOSIS — M25.561 ACUTE PAIN OF RIGHT KNEE: Primary | ICD-10-CM

## 2018-12-27 DIAGNOSIS — R09.81 NASAL CONGESTION: ICD-10-CM

## 2018-12-27 PROCEDURE — 73564 X-RAY EXAM KNEE 4 OR MORE: CPT

## 2018-12-27 PROCEDURE — 2709999900 HC NON-CHARGEABLE SUPPLY

## 2018-12-27 PROCEDURE — 99283 EMERGENCY DEPT VISIT LOW MDM: CPT

## 2018-12-27 RX ORDER — METHYLPREDNISOLONE 4 MG/1
TABLET ORAL
Qty: 1 KIT | Refills: 0 | Status: SHIPPED | OUTPATIENT
Start: 2018-12-27 | End: 2019-01-02

## 2018-12-27 ASSESSMENT — ENCOUNTER SYMPTOMS
EYE DISCHARGE: 0
VOMITING: 0
SORE THROAT: 0
COUGH: 0
RHINORRHEA: 0
DIARRHEA: 0
EYE REDNESS: 0
BACK PAIN: 0
WHEEZING: 0
SHORTNESS OF BREATH: 0
ABDOMINAL PAIN: 0
NAUSEA: 0

## 2018-12-27 ASSESSMENT — PAIN DESCRIPTION - DESCRIPTORS: DESCRIPTORS: SHARP

## 2018-12-27 ASSESSMENT — PAIN DESCRIPTION - LOCATION: LOCATION: KNEE

## 2018-12-27 ASSESSMENT — PAIN SCALES - GENERAL: PAINLEVEL_OUTOF10: 8

## 2019-01-10 ENCOUNTER — HOSPITAL ENCOUNTER (EMERGENCY)
Age: 49
Discharge: HOME | End: 2019-01-10
Attending: EMERGENCY MEDICINE
Payer: MEDICAID

## 2019-01-10 VITALS
HEART RATE: 98 BPM | TEMPERATURE: 98 F | SYSTOLIC BLOOD PRESSURE: 132 MMHG | WEIGHT: 185 LBS | DIASTOLIC BLOOD PRESSURE: 92 MMHG | HEIGHT: 68 IN | BODY MASS INDEX: 28.04 KG/M2 | RESPIRATION RATE: 20 BRPM | OXYGEN SATURATION: 99 %

## 2019-01-10 DIAGNOSIS — J06.9 ACUTE UPPER RESPIRATORY INFECTION: Primary | ICD-10-CM

## 2019-01-10 PROCEDURE — 99283 EMERGENCY DEPT VISIT LOW MDM: CPT

## 2019-01-10 RX ORDER — LORATADINE 10 MG/1
10 TABLET ORAL DAILY
Qty: 20 TABLET | Refills: 0 | Status: SHIPPED | OUTPATIENT
Start: 2019-01-10

## 2019-01-10 RX ORDER — FLUTICASONE PROPIONATE 50 MCG
1 SPRAY, SUSPENSION (ML) NASAL DAILY
Qty: 1 BOTTLE | Refills: 0 | Status: SHIPPED | OUTPATIENT
Start: 2019-01-10

## 2019-01-10 ASSESSMENT — ENCOUNTER SYMPTOMS
COUGH: 1
ABDOMINAL PAIN: 0
VOMITING: 0
NAUSEA: 0
DIARRHEA: 0
EYE DISCHARGE: 0
RHINORRHEA: 1
WHEEZING: 0
SHORTNESS OF BREATH: 0
SORE THROAT: 0

## 2019-01-23 ENCOUNTER — HOSPITAL ENCOUNTER (EMERGENCY)
Age: 49
Discharge: HOME | End: 2019-01-23
Payer: MEDICAID

## 2019-01-23 ENCOUNTER — APPOINTMENT (OUTPATIENT)
Dept: GENERAL RADIOLOGY | Age: 49
End: 2019-01-23
Payer: MEDICAID

## 2019-01-23 VITALS
RESPIRATION RATE: 19 BRPM | TEMPERATURE: 99 F | OXYGEN SATURATION: 99 % | HEART RATE: 98 BPM | DIASTOLIC BLOOD PRESSURE: 97 MMHG | SYSTOLIC BLOOD PRESSURE: 156 MMHG

## 2019-01-23 DIAGNOSIS — G89.29 CHRONIC PAIN OF RIGHT KNEE: ICD-10-CM
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
## 2019-01-23 DIAGNOSIS — K02.9 PAIN DUE TO DENTAL CARIES: Primary | ICD-10-CM

## 2019-01-23 DIAGNOSIS — M25.561 CHRONIC PAIN OF RIGHT KNEE: ICD-10-CM

## 2019-01-23 DIAGNOSIS — T69.9XXA: ICD-10-CM

## 2019-01-23 PROCEDURE — 73564 X-RAY EXAM KNEE 4 OR MORE: CPT

## 2019-01-23 PROCEDURE — 99283 EMERGENCY DEPT VISIT LOW MDM: CPT

## 2019-01-23 ASSESSMENT — PAIN DESCRIPTION - DESCRIPTORS: DESCRIPTORS: ACHING

## 2019-01-23 ASSESSMENT — PAIN DESCRIPTION - ORIENTATION: ORIENTATION: RIGHT

## 2019-01-23 ASSESSMENT — ENCOUNTER SYMPTOMS
VOMITING: 0
NAUSEA: 0
ABDOMINAL PAIN: 0
SORE THROAT: 0
EYE REDNESS: 0
BACK PAIN: 0
DIARRHEA: 0
COUGH: 0
WHEEZING: 0
RHINORRHEA: 0
EYE DISCHARGE: 0
SHORTNESS OF BREATH: 0

## 2019-01-23 ASSESSMENT — PAIN DESCRIPTION - LOCATION: LOCATION: KNEE

## 2019-01-23 ASSESSMENT — PAIN DESCRIPTION - FREQUENCY: FREQUENCY: CONTINUOUS

## 2019-01-23 ASSESSMENT — PAIN DESCRIPTION - PAIN TYPE: TYPE: ACUTE PAIN

## 2019-11-11 ENCOUNTER — HOSPITAL ENCOUNTER (EMERGENCY)
Age: 49
Discharge: HOME | End: 2019-11-11
Payer: MEDICARE

## 2019-11-11 VITALS
BODY MASS INDEX: 23.79 KG/M2 | RESPIRATION RATE: 12 BRPM | DIASTOLIC BLOOD PRESSURE: 85 MMHG | HEART RATE: 73 BPM | TEMPERATURE: 98.1 F | OXYGEN SATURATION: 100 % | SYSTOLIC BLOOD PRESSURE: 145 MMHG | WEIGHT: 157 LBS | HEIGHT: 68 IN

## 2019-11-11 DIAGNOSIS — K08.89 PAIN, DENTAL: Primary | ICD-10-CM

## 2019-11-11 PROCEDURE — 6370000000 HC RX 637 (ALT 250 FOR IP): Performed by: EMERGENCY MEDICINE

## 2019-11-11 PROCEDURE — 99282 EMERGENCY DEPT VISIT SF MDM: CPT

## 2019-11-11 RX ORDER — PENICILLIN V POTASSIUM 500 MG/1
500 TABLET ORAL 4 TIMES DAILY
Qty: 28 TABLET | Refills: 0 | Status: SHIPPED | OUTPATIENT
Start: 2019-11-11 | End: 2019-11-18

## 2019-11-11 RX ORDER — ACETAMINOPHEN 325 MG/1
650 TABLET ORAL ONCE
Status: COMPLETED | OUTPATIENT
Start: 2019-11-11 | End: 2019-11-11

## 2019-11-11 RX ORDER — TRAMADOL HYDROCHLORIDE 50 MG/1
50 TABLET ORAL ONCE
Status: COMPLETED | OUTPATIENT
Start: 2019-11-11 | End: 2019-11-11

## 2019-11-11 RX ORDER — LIDOCAINE HYDROCHLORIDE 20 MG/ML
5 SOLUTION OROPHARYNGEAL PRN
Qty: 1 BOTTLE | Refills: 0 | Status: ON HOLD | OUTPATIENT
Start: 2019-11-11 | End: 2022-02-02 | Stop reason: HOSPADM

## 2019-11-11 RX ORDER — ACETAMINOPHEN 325 MG/1
650 TABLET ORAL EVERY 6 HOURS PRN
Qty: 20 TABLET | Refills: 0 | Status: SHIPPED | OUTPATIENT
Start: 2019-11-11 | End: 2021-04-04 | Stop reason: SDUPTHER

## 2019-11-11 RX ORDER — LIDOCAINE HYDROCHLORIDE 20 MG/ML
5 SOLUTION OROPHARYNGEAL
Status: DISCONTINUED | OUTPATIENT
Start: 2019-11-11 | End: 2019-11-11 | Stop reason: HOSPADM

## 2019-11-11 RX ADMIN — ACETAMINOPHEN 650 MG: 325 TABLET ORAL at 15:19

## 2019-11-11 RX ADMIN — TRAMADOL HYDROCHLORIDE 50 MG: 50 TABLET ORAL at 15:19

## 2019-11-11 RX ADMIN — LIDOCAINE HYDROCHLORIDE 5 ML: 20 SOLUTION ORAL; TOPICAL at 15:19

## 2019-11-11 ASSESSMENT — PAIN DESCRIPTION - PAIN TYPE: TYPE: ACUTE PAIN

## 2019-11-11 ASSESSMENT — PAIN SCALES - GENERAL
PAINLEVEL_OUTOF10: 8
PAINLEVEL_OUTOF10: 8

## 2019-11-11 ASSESSMENT — ENCOUNTER SYMPTOMS
ABDOMINAL DISTENTION: 0
SINUS PAIN: 0
ABDOMINAL PAIN: 0
SHORTNESS OF BREATH: 0
BACK PAIN: 0
FACIAL SWELLING: 0

## 2019-11-11 ASSESSMENT — PAIN DESCRIPTION - DESCRIPTORS: DESCRIPTORS: ACHING

## 2019-11-11 ASSESSMENT — PAIN DESCRIPTION - FREQUENCY: FREQUENCY: CONTINUOUS

## 2019-11-11 ASSESSMENT — PAIN DESCRIPTION - ORIENTATION: ORIENTATION: LEFT;RIGHT;LOWER

## 2019-11-11 ASSESSMENT — PAIN DESCRIPTION - LOCATION: LOCATION: TEETH

## 2021-04-04 ENCOUNTER — HOSPITAL ENCOUNTER (EMERGENCY)
Age: 51
Discharge: HOME | End: 2021-04-04
Attending: EMERGENCY MEDICINE
Payer: MEDICARE

## 2021-04-04 VITALS
WEIGHT: 170 LBS | OXYGEN SATURATION: 100 % | RESPIRATION RATE: 16 BRPM | SYSTOLIC BLOOD PRESSURE: 148 MMHG | DIASTOLIC BLOOD PRESSURE: 95 MMHG | TEMPERATURE: 97.7 F | HEART RATE: 70 BPM | BODY MASS INDEX: 25.85 KG/M2

## 2021-04-04 DIAGNOSIS — K08.89 PAIN, DENTAL: Primary | ICD-10-CM

## 2021-04-04 DIAGNOSIS — R22.0 FACIAL SWELLING: ICD-10-CM

## 2021-04-04 PROCEDURE — 6370000000 HC RX 637 (ALT 250 FOR IP): Performed by: EMERGENCY MEDICINE

## 2021-04-04 PROCEDURE — 99283 EMERGENCY DEPT VISIT LOW MDM: CPT

## 2021-04-04 RX ORDER — PENICILLIN V POTASSIUM 250 MG/1
500 TABLET ORAL ONCE
Status: COMPLETED | OUTPATIENT
Start: 2021-04-04 | End: 2021-04-04

## 2021-04-04 RX ORDER — ACETAMINOPHEN 500 MG
1000 TABLET ORAL ONCE
Status: DISCONTINUED | OUTPATIENT
Start: 2021-04-04 | End: 2021-04-04

## 2021-04-04 RX ORDER — PENICILLIN V POTASSIUM 500 MG/1
500 TABLET ORAL 4 TIMES DAILY
Qty: 28 TABLET | Refills: 0 | Status: SHIPPED | OUTPATIENT
Start: 2021-04-04 | End: 2021-04-11

## 2021-04-04 RX ORDER — ACETAMINOPHEN 500 MG
1000 TABLET ORAL ONCE
Status: COMPLETED | OUTPATIENT
Start: 2021-04-04 | End: 2021-04-04

## 2021-04-04 RX ORDER — ACETAMINOPHEN 325 MG/1
650 TABLET ORAL EVERY 6 HOURS PRN
Qty: 120 TABLET | Refills: 3 | Status: SHIPPED | OUTPATIENT
Start: 2021-04-04 | End: 2022-04-12

## 2021-04-04 RX ADMIN — PENICILLIN V POTASSIUM 500 MG: 250 TABLET ORAL at 14:47

## 2021-04-04 RX ADMIN — ACETAMINOPHEN 1000 MG: 500 TABLET, FILM COATED ORAL at 14:47

## 2021-04-04 ASSESSMENT — PAIN DESCRIPTION - FREQUENCY: FREQUENCY: CONTINUOUS

## 2021-04-04 ASSESSMENT — PAIN DESCRIPTION - DESCRIPTORS: DESCRIPTORS: THROBBING

## 2021-04-04 ASSESSMENT — ENCOUNTER SYMPTOMS: FACIAL SWELLING: 1

## 2021-04-04 NOTE — ED PROVIDER NOTES
Mother         SOCIAL HISTORY       Social History     Socioeconomic History    Marital status: Single     Spouse name: Not on file    Number of children: Not on file    Years of education: Not on file    Highest education level: Not on file   Occupational History    Not on file   Social Needs    Financial resource strain: Not on file    Food insecurity     Worry: Not on file     Inability: Not on file    Transportation needs     Medical: Not on file     Non-medical: Not on file   Tobacco Use    Smoking status: Current Every Day Smoker     Packs/day: 0.20     Years: 28.00     Pack years: 5.60     Types: Cigarettes    Smokeless tobacco: Never Used   Substance and Sexual Activity    Alcohol use: Yes     Alcohol/week: 0.0 standard drinks     Comment: Spends $39. a day on beer (49 ozs) pt states he quit a month ago, pt denies     Drug use: Yes     Types: Marijuana     Comment:  pt denies quit a month ago     Sexual activity: Not on file   Lifestyle    Physical activity     Days per week: Not on file     Minutes per session: Not on file    Stress: Not on file   Relationships    Social connections     Talks on phone: Not on file     Gets together: Not on file     Attends Cheondo[employer] service: Not on file     Active member of club or organization: Not on file     Attends meetings of clubs or organizations: Not on file     Relationship status: Not on file    Intimate partner violence     Fear of current or ex partner: Not on file     Emotionally abused: Not on file     Physically abused: Not on file     Forced sexual activity: Not on file   Other Topics Concern    Not on file   Social History Narrative    Not on file       REVIEW OF SYSTEMS     Review of Systems   Constitutional: Negative for chills and fever. HENT: Positive for dental problem and facial swelling. Except as noted above the remainder of the review of systems was reviewed and is.    PHYSICAL EXAM    (up to 7 for level 4, 8 or more for level 5)     ED Triage Vitals [04/04/21 1354]   BP Temp Temp Source Pulse Resp SpO2 Height Weight   (!) 148/95 97.7 °F (36.5 °C) Oral 70 16 100 % -- 170 lb (77.1 kg)       Physical Exam  Vitals signs and nursing note reviewed. Constitutional:       Appearance: He is well-developed. HENT:      Head: Normocephalic. Mouth/Throat:      Comments: Mild right-sided facial swelling, mostly in the right lower premolar area. No ANUG, no Fabrice's, no trismus. Soft floor the mouth. No discernible abscess. Eyes:      Conjunctiva/sclera: Conjunctivae normal.      Pupils: Pupils are equal, round, and reactive to light. Neck:      Musculoskeletal: Neck supple. Trachea: No tracheal deviation. Pulmonary:      Effort: Pulmonary effort is normal.   Musculoskeletal: Normal range of motion. Skin:     General: Skin is warm and dry. Neurological:      Mental Status: He is alert and oriented to person, place, and time. Cranial Nerves: No cranial nerve deficit. DIAGNOSTIC RESULTS     EKG:(none if blank)  All EKG's are [facility] who either signs or Co-signs this chart in the absence of a cardiologist.        RADIOLOGY: (none if blank)   Interpretation per the Radiologistbelow, if available at the time of this note:    No orders to display       LABS:  Labs Reviewed - No data to display    All other labs were within normal range or not returned as of this dictation. Please note, any cultures that may have been sent were not resulted at the time of this patient visit.     EMERGENCY DEPARTMENT COURSE andMedical Decision Making:     MDM/   The patient presents with complaint of pain which was evaluated for concerns of serious pathology that included:   Dillon's Angina   Retropharyngeal abscess   Epiglottitis   Peritonsillar abscess   Strep pharyngitis   Foreign body    Patient noted not to have any of the previous etiology and will be discharged home with follow up.  The patient understands that at this time there is no evidence for a more malignant underlying process, but the patient also understands that early in the process of an illness or injury, an emergency department workup can be falsely reassuring. Routine discharge counseling was given, and the patient understands that worsening, changing or persistent symptoms should prompt an immediate call or follow up with their primary physician or return to the emergency department. The importance of appropriate follow up was also discussed. More extensive discharge instructions were given in the patients discharge paperwork. Strict returnprecautions and follow up instructions were discussed with the patient with which the patient agrees      The patient was evaluated during the COVID-19 pandemic. The patient was screened today during their presentation for commonly recognized symptoms and signs of COVID-19 infection. Their evaluation, treatment and testing was consistent with current guidelines for patients who present with complaints or symptoms that may be related to COVID-19. ED Medications administered this visit:    Medications   penicillin v potassium (VEETID) tablet 500 mg (has no administration in time range)   acetaminophen (TYLENOL) tablet 1,000 mg (has no administration in time range)         Procedures: (None if blank)       CLINICAL       1. Pain, dental    2.  Facial swelling          DISPOSITION/PLAN   DISPOSITION Discharge - Pending Orders Complete 04/04/2021 02:40:33 PM      PATIENT REFERRED TO:  Your dentist  in 1-2 days  In 2 days        DISCHARGE MEDICATIONS:  New Prescriptions    ACETAMINOPHEN (AMINOFEN) 325 MG TABLET    Take 2 tablets by mouth every 6 hours as needed for Pain    PENICILLIN V POTASSIUM (VEETID) 500 MG TABLET    Take 1 tablet by mouth 4 times daily for 7 days              (Please note that portions of this note were completed with a voice recognition program.  Efforts were made to edit the dictations but occasionallywords are mis-transcribed.)      Hattie Bobby DO, FACEP (electronically signed)  Attending Physician, Emergency [address], DO  04/04/21 6473

## 2021-04-04 NOTE — ED NOTES
Pt to ED for c/o R sided facial swelling and dental pain. Pt had part of a tooth break off last week and \"thought I could wait it out\" pt states attempting to get into [address] [place] [address] dental clinic but \"they keep pushing that back and back. \" Denies taking anything for pain.       Patience Portillo RN  04/04/21 7322

## 2021-09-28 ENCOUNTER — APPOINTMENT (OUTPATIENT)
Dept: ULTRASOUND IMAGING | Age: 51
End: 2021-09-28
Payer: MEDICARE

## 2021-09-28 ENCOUNTER — HOSPITAL ENCOUNTER (EMERGENCY)
Age: 51
Discharge: HOME | End: 2021-09-28
Payer: MEDICARE

## 2021-09-28 VITALS
HEART RATE: 65 BPM | DIASTOLIC BLOOD PRESSURE: 85 MMHG | WEIGHT: 174 LBS | OXYGEN SATURATION: 99 % | BODY MASS INDEX: 26.46 KG/M2 | RESPIRATION RATE: 16 BRPM | SYSTOLIC BLOOD PRESSURE: 157 MMHG | TEMPERATURE: 98.3 F

## 2021-09-28 DIAGNOSIS — I10 ESSENTIAL HYPERTENSION: ICD-10-CM

## 2021-09-28 DIAGNOSIS — Z76.0 ENCOUNTER FOR MEDICATION REFILL: ICD-10-CM

## 2021-09-28 DIAGNOSIS — R59.0 INGUINAL LYMPHADENOPATHY: Primary | ICD-10-CM

## 2021-09-28 LAB
ANION GAP SERPL CALCULATED.3IONS-SCNC: 12 MEQ/L (ref 8–16)
BASOPHILS # BLD: 2.9 %
BASOPHILS ABSOLUTE: 0.2 THOU/MM3 (ref 0–0.1)
BILIRUBIN URINE: NEGATIVE
BLOOD, URINE: NEGATIVE
BUN BLDV-MCNC: 20 MG/DL (ref 7–22)
CALCIUM SERPL-MCNC: 9.3 MG/DL (ref 8.5–10.5)
CHARACTER, URINE: CLEAR
CHLORIDE BLD-SCNC: 104 MEQ/L (ref 98–111)
CO2: 24 MEQ/L (ref 23–33)
COLOR: YELLOW
CREAT SERPL-MCNC: 1.1 MG/DL (ref 0.4–1.2)
EOSINOPHIL # BLD: 6.4 %
EOSINOPHILS ABSOLUTE: 0.5 THOU/MM3 (ref 0–0.4)
ERYTHROCYTE [DISTWIDTH] IN BLOOD BY AUTOMATED COUNT: 15.9 % (ref 11.5–14.5)
ERYTHROCYTE [DISTWIDTH] IN BLOOD BY AUTOMATED COUNT: 44.8 FL (ref 35–45)
GFR SERPL CREATININE-BSD FRML MDRD: 86 ML/MIN/1.73M2
GLUCOSE BLD-MCNC: 117 MG/DL (ref 70–108)
GLUCOSE URINE: NEGATIVE MG/DL
HCT VFR BLD CALC: 42 % (ref 42–52)
HEMOGLOBIN: 13 GM/DL (ref 14–18)
IMMATURE GRANS (ABS): 0.03 THOU/MM3 (ref 0–0.07)
IMMATURE GRANULOCYTES: 0.4 %
KETONES, URINE: NEGATIVE
LEUKOCYTE ESTERASE, URINE: NEGATIVE
LYMPHOCYTES # BLD: 15.2 %
LYMPHOCYTES ABSOLUTE: 1.1 THOU/MM3 (ref 1–4.8)
MCH RBC QN AUTO: 24.5 PG (ref 26–33)
MCHC RBC AUTO-ENTMCNC: 31 GM/DL (ref 32.2–35.5)
MCV RBC AUTO: 79.1 FL (ref 80–94)
MONOCYTES # BLD: 12.7 %
MONOCYTES ABSOLUTE: 0.9 THOU/MM3 (ref 0.4–1.3)
NITRITE, URINE: NEGATIVE
NUCLEATED RED BLOOD CELLS: 0 /100 WBC
OSMOLALITY CALCULATION: 283 MOSMOL/KG (ref 275–300)
PH UA: 5 (ref 5–9)
PLATELET # BLD: 352 THOU/MM3 (ref 130–400)
PMV BLD AUTO: 8.8 FL (ref 9.4–12.4)
POTASSIUM SERPL-SCNC: 5 MEQ/L (ref 3.5–5.2)
PROTEIN UA: NEGATIVE
RBC # BLD: 5.31 MILL/MM3 (ref 4.7–6.1)
SEG NEUTROPHILS: 62.4 %
SEGMENTED NEUTROPHILS ABSOLUTE COUNT: 4.5 THOU/MM3 (ref 1.8–7.7)
SODIUM BLD-SCNC: 140 MEQ/L (ref 135–145)
SPECIFIC GRAVITY, URINE: 1.01 (ref 1–1.03)
UROBILINOGEN, URINE: 0.2 EU/DL (ref 0–1)
WBC # BLD: 7.2 THOU/MM3 (ref 4.8–10.8)

## 2021-09-28 PROCEDURE — 81003 URINALYSIS AUTO W/O SCOPE: CPT

## 2021-09-28 PROCEDURE — 76882 US LMTD JT/FCL EVL NVASC XTR: CPT

## 2021-09-28 PROCEDURE — 85025 COMPLETE CBC W/AUTO DIFF WBC: CPT

## 2021-09-28 PROCEDURE — 80048 BASIC METABOLIC PNL TOTAL CA: CPT

## 2021-09-28 PROCEDURE — 99282 EMERGENCY DEPT VISIT SF MDM: CPT

## 2021-09-28 PROCEDURE — 36415 COLL VENOUS BLD VENIPUNCTURE: CPT

## 2021-09-28 PROCEDURE — 6370000000 HC RX 637 (ALT 250 FOR IP): Performed by: NURSE PRACTITIONER

## 2021-09-28 RX ORDER — LISINOPRIL 10 MG/1
10 TABLET ORAL DAILY
Status: DISCONTINUED | OUTPATIENT
Start: 2021-09-28 | End: 2021-09-28 | Stop reason: HOSPADM

## 2021-09-28 RX ORDER — LISINOPRIL 10 MG/1
10 TABLET ORAL DAILY
Qty: 30 TABLET | Refills: 0 | Status: ON HOLD | OUTPATIENT
Start: 2021-09-28 | End: 2022-02-02 | Stop reason: HOSPADM

## 2021-09-28 RX ADMIN — LISINOPRIL 10 MG: 10 TABLET ORAL at 13:42

## 2021-09-28 ASSESSMENT — ENCOUNTER SYMPTOMS
CHEST TIGHTNESS: 0
NAUSEA: 0
BACK PAIN: 0
ABDOMINAL PAIN: 0
COUGH: 0
RHINORRHEA: 0
VOMITING: 0
EYE REDNESS: 0

## 2021-09-28 NOTE — ED PROVIDER NOTES
[facility] Emergency Department    CHIEF COMPLAINT       Chief Complaint   Patient presents with    Hernia       Nurses Notes reviewed and I agree except as noted in the HPI. HISTORY OF PRESENT ILLNESS    Rashard Jolley mike 49 y.o. male who presents to the ED for evaluation of swelling in the left groin. He isn't sure if it is an abscess or a hernia. The patient denies pain. Also is requesting a refill of his blood pressure medication. HPI was provided by the patient    REVIEW OF SYSTEMS     Review of Systems   Constitutional: Negative for chills, fatigue and fever. HENT: Negative for congestion, ear discharge, ear pain, postnasal drip and rhinorrhea. Eyes: Negative for redness. Respiratory: Negative for cough and chest tightness. Cardiovascular: Negative for chest pain and leg swelling. Gastrointestinal: Negative for abdominal pain, nausea and vomiting. Genitourinary: Negative for difficulty urinating, dysuria, enuresis, flank pain and hematuria. Musculoskeletal: Negative for back pain and joint swelling. Skin: Negative for rash. Neurological: Negative for dizziness, light-headedness, numbness and headaches. Hematological: Positive for adenopathy. Psychiatric/Behavioral: Negative for agitation, behavioral problems and confusion. All other systems negative except as noted. PAST MEDICAL HISTORY     Past Medical History:   Diagnosis Date    GERD (gastroesophageal reflux disease)     Headache(784.0)     Hyperlipidemia     Hypertension        SURGICALHISTORY      has a past surgical history that includes Mandible fracture surgery.     CURRENT MEDICATIONS       Discharge Medication List as of 9/28/2021  3:31 PM      CONTINUE these medications which have NOT CHANGED    Details   acetaminophen (AMINOFEN) 325 MG tablet Take 2 tablets by mouth every 6 hours as needed for Pain, Disp-120 tablet, R-3Normal      !! lidocaine viscous hcl (XYLOCAINE) 2 % SOLN solution Take 5 mLs by mouth as needed for Irritation, Disp-1 Bottle, R-0Print      !! lidocaine viscous (XYLOCAINE) 2 % solution Take 5 mLs by mouth as needed for Dental Pain, Disp-100 mL, R-0Print      fluticasone (FLONASE) 50 MCG/ACT nasal spray 1 spray by Each Nare route daily, Disp-1 Bottle, R-0Print      loratadine (CLARITIN) 10 MG tablet Take 1 tablet by mouth daily, Disp-20 tablet, R-0Print      naproxen (NAPROSYN) 500 MG tablet Take 1 tablet by mouth 2 times daily (with meals) for 30 doses, Disp-30 tablet, R-0Print      atorvastatin (LIPITOR) 40 MG tablet take 1 tablet by mouth once daily, Disp-30 tablet, R-6       !! - Potential duplicate medications found. Please discuss with provider. ALLERGIES     is allergic to aspirin and motrin [ibuprofen micronized]. FAMILY HISTORY     He indicated that his mother is alive. He indicated that his father is alive. He indicated that the status of his sister is unknown.   family history includes Diabetes in his mother; Heart Disease in his mother; Hypertension in his mother. SOCIAL HISTORY       Social History     Socioeconomic History    Marital status: Single     Spouse name: Not on file    Number of children: Not on file    Years of education: Not on file    Highest education level: Not on file   Occupational History    Not on file   Tobacco Use    Smoking status: Current Every Day Smoker     Packs/day: 0.20     Years: 28.00     Pack years: 5.60     Types: Cigarettes    Smokeless tobacco: Never Used   Vaping Use    Vaping Use: Never used   Substance and Sexual Activity    Alcohol use:  Yes     Alcohol/week: 0.0 standard drinks     Comment: Spends $39. a day on beer (49 ozs) pt states he quit a month ago, pt denies     Drug use: Yes     Types: Marijuana     Comment:  pt denies quit a month ago     Sexual activity: Not on file   Other Topics Concern    Not on file   Social History Narrative    Not on file     Social Determinants of Health     Financial Resource Strain:     Difficulty of Paying Living Expenses:    Food Insecurity:     Worried About Running Out of Food in the Last Year:     [address] in the Last Year:    Transportation Needs:     Lack of Transportation (Medical):  Lack of Transportation (Non-Medical):    Physical Activity:     Days of Exercise per Week:     Minutes of Exercise per Session:    Stress:     Feeling of Stress :    Social Connections:     Frequency of Communication with Friends and Family:     Frequency of Social Gatherings with Friends and Family:     Attends [employer] Services:     Active Member of Clubs or Organizations:     Attends Club or Organization Meetings:     Marital Status:    Intimate Partner Violence:     Fear of Current or Ex-Partner:     Emotionally Abused:     Physically Abused:     Sexually Abused:        PHYSICAL EXAM     INITIAL VITALS:  weight is 174 lb (78.9 kg). His oral temperature is 98.3 °F (36.8 °C). His blood pressure is 157/85 (abnormal) and his pulse is 65. His respiration is 16 and oxygen saturation is 99%. Physical Exam  Vitals and nursing note reviewed. Constitutional:       General: He is not in acute distress. Appearance: He is well-developed. He is not diaphoretic. HENT:      Head: Normocephalic and atraumatic. Nose: Nose normal.      Mouth/Throat:      Mouth: Mucous membranes are moist.      Pharynx: Oropharynx is clear. Eyes:      General:         Right eye: No discharge. Left eye: No discharge. Conjunctiva/sclera: Conjunctivae normal.   Neck:      Trachea: No tracheal deviation. Cardiovascular:      Rate and Rhythm: Normal rate and regular rhythm. Pulses: Normal pulses. Heart sounds: Normal heart sounds. No murmur heard. No gallop. Comments: Normal capillary refill  Pulmonary:      Effort: Pulmonary effort is normal. No respiratory distress. Breath sounds: Normal breath sounds. No stridor.    Abdominal:      General: Bowel sounds are normal.      Palpations: Abdomen is soft. Comments: Questionable hernia in the left inguinal canal.     Musculoskeletal:         General: No tenderness or deformity. Normal range of motion. Cervical back: Normal range of motion. Skin:     General: Skin is warm and dry. Capillary Refill: Capillary refill takes less than 2 seconds. Coloration: Skin is not pale. Findings: No erythema or rash. Neurological:      General: No focal deficit present. Mental Status: He is alert and oriented to person, place, and time. Cranial Nerves: No cranial nerve deficit. Psychiatric:         Mood and Affect: Mood normal.         Behavior: Behavior normal.         DIFFERENTIAL DIAGNOSIS:   Hernia, abscess, lymph node swelling. DIAGNOSTIC RESULTS     EKG: All EKG's are interpreted by the Emergency Department Physician who eithersigns or Co-signs this chart in the absence of a cardiologist.        RADIOLOGY: non-plainfilm images(s) such as CT, Ultrasound and MRI are read by the radiologist.  Plain radiographic images are visualized and preliminarily interpreted by the emergency physician unless otherwise stated below. US EXTREMITY LEFT NON VASC LIMITED   Final Result       1. Multiple enlarged lymph nodes in the left inguinal region the largest one which measures 3.9 x 1.9 x 0.9 cm in size. 2. No evidence of a left inguinal hernia. .               **This report has been created using voice recognition software. It may contain minor errors which are inherent in voice recognition technology. **      Final report electronically signed by DR Alok Granger on 9/28/2021 12:54 PM            LABS:   Labs Reviewed   CBC WITH AUTO DIFFERENTIAL - Abnormal; Notable for the following components:       Result Value    Hemoglobin 13.0 (*)     MCV 79.1 (*)     MCH 24.5 (*)     MCHC 31.0 (*)     RDW-CV 15.9 (*)     MPV 8.8 (*)     Eosinophils Absolute 0.5 (*)     Basophils Absolute 0.2 (*) All other components within normal limits   BASIC METABOLIC PANEL - Abnormal; Notable for the following components:    Glucose 117 (*)     All other components within normal limits   GLOMERULAR FILTRATION RATE, ESTIMATED - Abnormal; Notable for the following components:    Est, Glom Filt Rate 86 (*)     All other components within normal limits   URINE RT REFLEX TO CULTURE   ANION GAP   OSMOLALITY       EMERGENCY DEPARTMENT COURSE:   Vitals:    Vitals:    09/28/21 1151 09/28/21 1342   BP:  (!) 157/85   Pulse: 65    Resp: 16    Temp: 98.3 °F (36.8 °C)    TempSrc: Oral    SpO2: 99%    Weight: 174 lb (78.9 kg)                              MDM    Patient was seen in the ER for left groin swelling. US was obtained and shows enlarged lymph nodes. Urine and labs are reassuring. Patient is treated with his home bp meds. Given a refill. Instructed to follow up. Medications   lisinopril (PRINIVIL;ZESTRIL) tablet 10 mg (10 mg Oral Given 9/28/21 1342)         Patient was seen independently by myself. The patient's final impression and disposition and plan was determined by myself. Strict return precautions and follow up instructions were discussed with the patient prior to discharge, with which the patient agrees. Physical assessment findings, diagnostic testing(s) if applicable, and vital signs reviewed with patient/patient representative. Questions answered. Medications asdirected, including OTC medications for supportive care. Education provided on medications. Differential diagnosis(s) discussed with patient/patient representative. Home care/self care instructions reviewed withpatient/patient representative. Patient is to follow-up with family care provider in 2-3 days if no improvement. Patient is to go to the emergency department if symptoms worsen. Patient/patient representative isaware of care plan, questions answered, verbalizes understanding and is in agreement.      CRITICAL CARE: None    CONSULTS:  None    PROCEDURES:  None    FINAL IMPRESSION     1. Inguinal lymphadenopathy    2. Essential hypertension    3.  Encounter for medication refill          DISPOSITION/PLAN   DISPOSITION Decision To Discharge 09/28/2021 03:30:05 PM      PATIENT REFERREDTO:  Bernard Diez MD  [address] [place] [address]    Schedule an appointment as soon as possible for a visit in 2 days  For follow up      DISCHARGE MEDICATIONS:  Discharge Medication List as of 9/28/2021  3:31 PM      START taking these medications    Details   lisinopril (PRINIVIL;ZESTRIL) 10 MG tablet Take 1 tablet by mouth daily, Disp-30 tablet, R-0Normal             (Please note that portions of this note were completed with a voice recognition program.  Efforts were made to edit the dictations but occasionally words are mis-transcribed.)         BARBARA Cartagena CNP, APRN - CNP  09/28/21 9021

## 2022-01-27 ENCOUNTER — APPOINTMENT (OUTPATIENT)
Dept: GENERAL RADIOLOGY | Age: 52
DRG: 137 | End: 2022-01-27
Payer: MEDICARE

## 2022-01-27 ENCOUNTER — HOSPITAL ENCOUNTER (INPATIENT)
Age: 52
LOS: 6 days | Discharge: HOME | DRG: 137 | End: 2022-02-02
Attending: EMERGENCY MEDICINE | Admitting: INTERNAL MEDICINE
Payer: MEDICARE

## 2022-01-27 ENCOUNTER — APPOINTMENT (OUTPATIENT)
Dept: CT IMAGING | Age: 52
DRG: 137 | End: 2022-01-27
Payer: MEDICARE

## 2022-01-27 DIAGNOSIS — R91.8 MASS OF LEFT LUNG: Primary | ICD-10-CM

## 2022-01-27 DIAGNOSIS — U07.1 COVID-19 VIRUS INFECTION: ICD-10-CM

## 2022-01-27 DIAGNOSIS — J18.9 PNEUMONIA OF RIGHT LOWER LOBE DUE TO INFECTIOUS ORGANISM: ICD-10-CM

## 2022-01-27 LAB
ALBUMIN SERPL-MCNC: 3.9 G/DL (ref 3.5–5.1)
ALP BLD-CCNC: 178 U/L (ref 38–126)
ALT SERPL-CCNC: 19 U/L (ref 11–66)
ANION GAP SERPL CALCULATED.3IONS-SCNC: 12 MEQ/L (ref 8–16)
AST SERPL-CCNC: 22 U/L (ref 5–40)
BASOPHILS # BLD: 0.3 %
BASOPHILS ABSOLUTE: 0 THOU/MM3 (ref 0–0.1)
BILIRUB SERPL-MCNC: 0.6 MG/DL (ref 0.3–1.2)
BILIRUBIN DIRECT: < 0.2 MG/DL (ref 0–0.3)
BUN BLDV-MCNC: 16 MG/DL (ref 7–22)
CALCIUM SERPL-MCNC: 9.2 MG/DL (ref 8.5–10.5)
CHLORIDE BLD-SCNC: 100 MEQ/L (ref 98–111)
CO2: 24 MEQ/L (ref 23–33)
CREAT SERPL-MCNC: 0.9 MG/DL (ref 0.4–1.2)
D-DIMER QUANTITATIVE: 2181 NG/ML FEU (ref 0–500)
EKG ATRIAL RATE: 96 BPM
EKG P AXIS: 51 DEGREES
EKG P-R INTERVAL: 146 MS
EKG Q-T INTERVAL: 368 MS
EKG QRS DURATION: 76 MS
EKG QTC CALCULATION (BAZETT): 460 MS
EKG R AXIS: -9 DEGREES
EKG T AXIS: 87 DEGREES
EKG VENTRICULAR RATE: 94 BPM
EOSINOPHIL # BLD: 0.6 %
EOSINOPHILS ABSOLUTE: 0.1 THOU/MM3 (ref 0–0.4)
ERYTHROCYTE [DISTWIDTH] IN BLOOD BY AUTOMATED COUNT: 15.3 % (ref 11.5–14.5)
ERYTHROCYTE [DISTWIDTH] IN BLOOD BY AUTOMATED COUNT: 41.5 FL (ref 35–45)
GLUCOSE BLD-MCNC: 109 MG/DL (ref 70–108)
HCT VFR BLD CALC: 40.1 % (ref 42–52)
HEMOGLOBIN: 13 GM/DL (ref 14–18)
IMMATURE GRANS (ABS): 0.07 THOU/MM3 (ref 0–0.07)
IMMATURE GRANULOCYTES: 0.4 %
LYMPHOCYTES # BLD: 7.4 %
LYMPHOCYTES ABSOLUTE: 1.2 THOU/MM3 (ref 1–4.8)
MCH RBC QN AUTO: 24.7 PG (ref 26–33)
MCHC RBC AUTO-ENTMCNC: 32.4 GM/DL (ref 32.2–35.5)
MCV RBC AUTO: 76.2 FL (ref 80–94)
MONOCYTES # BLD: 10.8 %
MONOCYTES ABSOLUTE: 1.7 THOU/MM3 (ref 0.4–1.3)
NUCLEATED RED BLOOD CELLS: 0 /100 WBC
OSMOLALITY CALCULATION: 273.7 MOSMOL/KG (ref 275–300)
PLATELET # BLD: 714 THOU/MM3 (ref 130–400)
PMV BLD AUTO: 9.4 FL (ref 9.4–12.4)
POTASSIUM REFLEX MAGNESIUM: 4.2 MEQ/L (ref 3.5–5.2)
PRO-BNP: 303.1 PG/ML (ref 0–900)
RBC # BLD: 5.26 MILL/MM3 (ref 4.7–6.1)
SARS-COV-2, NAAT: DETECTED
SEG NEUTROPHILS: 80.5 %
SEGMENTED NEUTROPHILS ABSOLUTE COUNT: 12.7 THOU/MM3 (ref 1.8–7.7)
SODIUM BLD-SCNC: 136 MEQ/L (ref 135–145)
TOTAL PROTEIN: 7.7 G/DL (ref 6.1–8)
TROPONIN T: < 0.01 NG/ML
WBC # BLD: 15.8 THOU/MM3 (ref 4.8–10.8)

## 2022-01-27 PROCEDURE — 96365 THER/PROPH/DIAG IV INF INIT: CPT

## 2022-01-27 PROCEDURE — 2580000003 HC RX 258: Performed by: EMERGENCY MEDICINE

## 2022-01-27 PROCEDURE — 87635 SARS-COV-2 COVID-19 AMP PRB: CPT

## 2022-01-27 PROCEDURE — 80076 HEPATIC FUNCTION PANEL: CPT

## 2022-01-27 PROCEDURE — 83880 ASSAY OF NATRIURETIC PEPTIDE: CPT

## 2022-01-27 PROCEDURE — 80048 BASIC METABOLIC PNL TOTAL CA: CPT

## 2022-01-27 PROCEDURE — 6360000002 HC RX W HCPCS: Performed by: INTERNAL MEDICINE

## 2022-01-27 PROCEDURE — 87040 BLOOD CULTURE FOR BACTERIA: CPT

## 2022-01-27 PROCEDURE — 93010 ELECTROCARDIOGRAM REPORT: CPT | Performed by: INTERNAL MEDICINE

## 2022-01-27 PROCEDURE — 84484 ASSAY OF TROPONIN QUANT: CPT

## 2022-01-27 PROCEDURE — 6360000004 HC RX CONTRAST MEDICATION: Performed by: EMERGENCY MEDICINE

## 2022-01-27 PROCEDURE — 6360000002 HC RX W HCPCS: Performed by: EMERGENCY MEDICINE

## 2022-01-27 PROCEDURE — 93005 ELECTROCARDIOGRAM TRACING: CPT | Performed by: EMERGENCY MEDICINE

## 2022-01-27 PROCEDURE — 6370000000 HC RX 637 (ALT 250 FOR IP): Performed by: INTERNAL MEDICINE

## 2022-01-27 PROCEDURE — 0W9930Z DRAINAGE OF RIGHT PLEURAL CAVITY WITH DRAINAGE DEVICE, PERCUTANEOUS APPROACH: ICD-10-PCS | Performed by: INTERNAL MEDICINE

## 2022-01-27 PROCEDURE — 99223 1ST HOSP IP/OBS HIGH 75: CPT | Performed by: INTERNAL MEDICINE

## 2022-01-27 PROCEDURE — 36415 COLL VENOUS BLD VENIPUNCTURE: CPT

## 2022-01-27 PROCEDURE — 96375 TX/PRO/DX INJ NEW DRUG ADDON: CPT

## 2022-01-27 PROCEDURE — 74177 CT ABD & PELVIS W/CONTRAST: CPT

## 2022-01-27 PROCEDURE — 71275 CT ANGIOGRAPHY CHEST: CPT

## 2022-01-27 PROCEDURE — 85025 COMPLETE CBC W/AUTO DIFF WBC: CPT

## 2022-01-27 PROCEDURE — 1200000000 HC SEMI PRIVATE

## 2022-01-27 PROCEDURE — 85379 FIBRIN DEGRADATION QUANT: CPT

## 2022-01-27 PROCEDURE — 2580000003 HC RX 258: Performed by: INTERNAL MEDICINE

## 2022-01-27 PROCEDURE — 99284 EMERGENCY DEPT VISIT MOD MDM: CPT

## 2022-01-27 PROCEDURE — 71046 X-RAY EXAM CHEST 2 VIEWS: CPT

## 2022-01-27 RX ORDER — SODIUM CHLORIDE 0.9 % (FLUSH) 0.9 %
5-40 SYRINGE (ML) INJECTION PRN
Status: DISCONTINUED | OUTPATIENT
Start: 2022-01-27 | End: 2022-02-02 | Stop reason: HOSPADM

## 2022-01-27 RX ORDER — ACETAMINOPHEN 325 MG/1
650 TABLET ORAL EVERY 6 HOURS PRN
Status: DISCONTINUED | OUTPATIENT
Start: 2022-01-27 | End: 2022-02-02 | Stop reason: HOSPADM

## 2022-01-27 RX ORDER — MORPHINE SULFATE 4 MG/ML
4 INJECTION, SOLUTION INTRAMUSCULAR; INTRAVENOUS ONCE
Status: COMPLETED | OUTPATIENT
Start: 2022-01-27 | End: 2022-01-27

## 2022-01-27 RX ORDER — SODIUM CHLORIDE 9 MG/ML
25 INJECTION, SOLUTION INTRAVENOUS PRN
Status: DISCONTINUED | OUTPATIENT
Start: 2022-01-27 | End: 2022-02-02 | Stop reason: HOSPADM

## 2022-01-27 RX ORDER — ACETAMINOPHEN 650 MG/1
650 SUPPOSITORY RECTAL EVERY 6 HOURS PRN
Status: DISCONTINUED | OUTPATIENT
Start: 2022-01-27 | End: 2022-02-02 | Stop reason: HOSPADM

## 2022-01-27 RX ORDER — ATORVASTATIN CALCIUM 10 MG/1
10 TABLET, FILM COATED ORAL NIGHTLY
Status: DISCONTINUED | OUTPATIENT
Start: 2022-01-27 | End: 2022-02-02 | Stop reason: HOSPADM

## 2022-01-27 RX ORDER — ONDANSETRON 4 MG/1
4 TABLET, ORALLY DISINTEGRATING ORAL EVERY 8 HOURS PRN
Status: DISCONTINUED | OUTPATIENT
Start: 2022-01-27 | End: 2022-02-02 | Stop reason: HOSPADM

## 2022-01-27 RX ORDER — 0.9 % SODIUM CHLORIDE 0.9 %
1000 INTRAVENOUS SOLUTION INTRAVENOUS ONCE
Status: COMPLETED | OUTPATIENT
Start: 2022-01-27 | End: 2022-01-27

## 2022-01-27 RX ORDER — ONDANSETRON 2 MG/ML
4 INJECTION INTRAMUSCULAR; INTRAVENOUS EVERY 6 HOURS PRN
Status: DISCONTINUED | OUTPATIENT
Start: 2022-01-27 | End: 2022-02-02 | Stop reason: HOSPADM

## 2022-01-27 RX ORDER — DEXAMETHASONE SODIUM PHOSPHATE 4 MG/ML
6 INJECTION, SOLUTION INTRA-ARTICULAR; INTRALESIONAL; INTRAMUSCULAR; INTRAVENOUS; SOFT TISSUE EVERY 24 HOURS
Status: DISCONTINUED | OUTPATIENT
Start: 2022-01-27 | End: 2022-01-28

## 2022-01-27 RX ORDER — SODIUM CHLORIDE 0.9 % (FLUSH) 0.9 %
5-40 SYRINGE (ML) INJECTION EVERY 12 HOURS SCHEDULED
Status: DISCONTINUED | OUTPATIENT
Start: 2022-01-27 | End: 2022-02-02 | Stop reason: HOSPADM

## 2022-01-27 RX ORDER — NICOTINE 21 MG/24HR
1 PATCH, TRANSDERMAL 24 HOURS TRANSDERMAL DAILY
Status: DISCONTINUED | OUTPATIENT
Start: 2022-01-28 | End: 2022-02-02 | Stop reason: HOSPADM

## 2022-01-27 RX ORDER — SODIUM CHLORIDE 9 MG/ML
INJECTION, SOLUTION INTRAVENOUS CONTINUOUS
Status: DISCONTINUED | OUTPATIENT
Start: 2022-01-27 | End: 2022-02-02 | Stop reason: HOSPADM

## 2022-01-27 RX ORDER — LISINOPRIL 10 MG/1
10 TABLET ORAL DAILY
Status: DISCONTINUED | OUTPATIENT
Start: 2022-01-28 | End: 2022-01-28

## 2022-01-27 RX ORDER — POLYETHYLENE GLYCOL 3350 17 G/17G
17 POWDER, FOR SOLUTION ORAL DAILY PRN
Status: DISCONTINUED | OUTPATIENT
Start: 2022-01-27 | End: 2022-02-02 | Stop reason: HOSPADM

## 2022-01-27 RX ORDER — IPRATROPIUM BROMIDE AND ALBUTEROL SULFATE 2.5; .5 MG/3ML; MG/3ML
1 SOLUTION RESPIRATORY (INHALATION) EVERY 4 HOURS PRN
Status: DISCONTINUED | OUTPATIENT
Start: 2022-01-27 | End: 2022-02-02 | Stop reason: HOSPADM

## 2022-01-27 RX ORDER — LEVOFLOXACIN 5 MG/ML
750 INJECTION, SOLUTION INTRAVENOUS EVERY 24 HOURS
Status: DISCONTINUED | OUTPATIENT
Start: 2022-01-28 | End: 2022-02-02 | Stop reason: HOSPADM

## 2022-01-27 RX ORDER — ONDANSETRON 2 MG/ML
4 INJECTION INTRAMUSCULAR; INTRAVENOUS ONCE
Status: COMPLETED | OUTPATIENT
Start: 2022-01-27 | End: 2022-01-27

## 2022-01-27 RX ORDER — LEVOFLOXACIN 5 MG/ML
750 INJECTION, SOLUTION INTRAVENOUS ONCE
Status: COMPLETED | OUTPATIENT
Start: 2022-01-27 | End: 2022-01-27

## 2022-01-27 RX ADMIN — ATORVASTATIN CALCIUM 10 MG: 10 TABLET, FILM COATED ORAL at 22:47

## 2022-01-27 RX ADMIN — SODIUM CHLORIDE: 9 INJECTION, SOLUTION INTRAVENOUS at 21:28

## 2022-01-27 RX ADMIN — ENOXAPARIN SODIUM 30 MG: 100 INJECTION SUBCUTANEOUS at 21:28

## 2022-01-27 RX ADMIN — CEFTRIAXONE SODIUM 1000 MG: 1 INJECTION, POWDER, FOR SOLUTION INTRAMUSCULAR; INTRAVENOUS at 15:28

## 2022-01-27 RX ADMIN — IOPAMIDOL 80 ML: 755 INJECTION, SOLUTION INTRAVENOUS at 15:26

## 2022-01-27 RX ADMIN — LEVOFLOXACIN 750 MG: 5 INJECTION, SOLUTION INTRAVENOUS at 16:45

## 2022-01-27 RX ADMIN — ONDANSETRON 4 MG: 2 INJECTION INTRAMUSCULAR; INTRAVENOUS at 14:17

## 2022-01-27 RX ADMIN — DEXAMETHASONE SODIUM PHOSPHATE 6 MG: 4 INJECTION, SOLUTION INTRA-ARTICULAR; INTRALESIONAL; INTRAMUSCULAR; INTRAVENOUS; SOFT TISSUE at 18:15

## 2022-01-27 RX ADMIN — MORPHINE SULFATE 4 MG: 4 INJECTION, SOLUTION INTRAMUSCULAR; INTRAVENOUS at 14:17

## 2022-01-27 RX ADMIN — ACETAMINOPHEN 650 MG: 325 TABLET ORAL at 21:29

## 2022-01-27 RX ADMIN — SODIUM CHLORIDE, PRESERVATIVE FREE 10 ML: 5 INJECTION INTRAVENOUS at 21:29

## 2022-01-27 RX ADMIN — SODIUM CHLORIDE 1000 ML: 9 INJECTION, SOLUTION INTRAVENOUS at 14:20

## 2022-01-27 ASSESSMENT — PAIN DESCRIPTION - LOCATION
LOCATION: CHEST

## 2022-01-27 ASSESSMENT — PAIN SCALES - GENERAL
PAINLEVEL_OUTOF10: 8
PAINLEVEL_OUTOF10: 0
PAINLEVEL_OUTOF10: 8
PAINLEVEL_OUTOF10: 0
PAINLEVEL_OUTOF10: 7
PAINLEVEL_OUTOF10: 6

## 2022-01-27 ASSESSMENT — PAIN DESCRIPTION - FREQUENCY
FREQUENCY: CONTINUOUS
FREQUENCY: INTERMITTENT

## 2022-01-27 ASSESSMENT — PAIN DESCRIPTION - PAIN TYPE: TYPE: ACUTE PAIN

## 2022-01-27 ASSESSMENT — PAIN DESCRIPTION - DESCRIPTORS
DESCRIPTORS: SHARP
DESCRIPTORS: SHARP

## 2022-01-27 ASSESSMENT — PAIN DESCRIPTION - ONSET: ONSET: ON-GOING

## 2022-01-27 ASSESSMENT — PAIN DESCRIPTION - ORIENTATION: ORIENTATION: RIGHT

## 2022-01-27 NOTE — H&P
HISTORY AND PHYSICAL             Date: 1/27/2022        Patient Name: Estelle Malhotra     YOB: 1970      Age:  49 y.o. Chief Complaint     Chief Complaint   Patient presents with    Chest Pain     right side that travels to right back         History Obtained From   patient    History of Present Illness   Estelle Malhotra is a 49 y.o. male who presents with complaint of right-sided chest pain, states the pain travels into his back and right upper abdomen. He has had pain for about a week. Pain is made worse with inspiration, truncal rotation. Pain not made worse with palpation of the chest wall. He has no history of cancer. He smokes about half a pack per day. Past Medical History     Past Medical History:   Diagnosis Date    GERD (gastroesophageal reflux disease)     Headache(784.0)     Hyperlipidemia     Hypertension         Past Surgical History     Past Surgical History:   Procedure Laterality Date    MANDIBLE FRACTURE SURGERY          Medications Prior to Admission     Prior to Admission medications    Medication Sig Start Date End Date Taking?  Authorizing Provider   lisinopril (PRINIVIL;ZESTRIL) 10 MG tablet Take 1 tablet by mouth daily 9/28/21  Yes Leandro Hatchet, APRN - CNP   acetaminophen (AMINOFEN) 325 MG tablet Take 2 tablets by mouth every 6 hours as needed for Pain 4/4/21  Yes Seymour Lazaro,    lidocaine viscous hcl (XYLOCAINE) 2 % SOLN solution Take 5 mLs by mouth as needed for Irritation 11/11/19  Yes BARBARA Peterson CNP   lidocaine viscous (XYLOCAINE) 2 % solution Take 5 mLs by mouth as needed for Dental Pain 1/23/19  Yes BARBARA Peterson CNP   fluticasone [facility]) 50 MCG/ACT nasal spray 1 spray by Each Nare route daily 1/10/19  Yes Marine Mendoza MD   loratadine (CLARITIN) 10 MG tablet Take 1 tablet by mouth daily 1/10/19  Yes Marine Mendoza MD   atorvastatin (LIPITOR) 40 MG tablet take 1 tablet by mouth once daily 4/28/16  Yes BARBARA Gracia - CNP   naproxen (NAPROSYN) 500 MG tablet Take 1 tablet by mouth 2 times daily (with meals) for 30 doses 5/24/18 6/8/18  Steven Counts, APRN - CNP        Allergies   Aspirin and Motrin [ibuprofen micronized]    Social History     Social History     Tobacco History     Smoking Status  Current Every Day Smoker Smoking Frequency  0.2 packs/day for 28 years (5.6 pk yrs) Smoking Tobacco Type  Cigarettes    Smokeless Tobacco Use  Never Used          Alcohol History     Alcohol Use Status  Yes Drinks/Week  0 Standard drinks or equivalent per week Amount  0.0 standard drinks of alcohol/wk Comment  Spends $39. a day on beer (49 ozs) pt states he quit a month ago, pt denies           Drug Use     Drug Use Status  Yes Types  Marijuana (Weed) Comment   pt denies quit a month ago           Sexual Activity     Sexually Active  Not Asked                Family History     Family History   Problem Relation Age of Onset    Heart Disease Mother     Diabetes Mother     Hypertension Mother        Review of Systems   Constitutional: Negative for fever, chills, diaphoresis and fatigue. HENT: Negative for congestion, drooling, facial swelling and sore throat. Eyes: Negative for photophobia, pain and discharge. Respiratory: Positive for cough, shortness of breath; negative for wheezing and stridor. Positive for chest pain. Cardiovascular: Negative for chest pain, palpitations and leg swelling. Gastrointestinal: Negative for abdominal pain, blood in stool and abdominal distention. Genitourinary: Negative for dysuria, urgency, hematuria and difficulty urinating. Musculoskeletal: Negative for gait problem, neck pain and neck stiffness. Skin; No rash, No itching  Neurological: Negative for seizures, weakness and numbness. Hematological: Negative for adenopathy. Does not bruise/bleed easily.    Psychiatric/Behavioral: Negative for hallucinations, confusion and agitation    Physical Exam   BP (!) 157/91   Pulse 96   Temp 98.5 °F (36.9 °C) (Oral)   Resp 28   Ht 5' 7\" (1.702 m)   Wt 165 lb (74.8 kg)   SpO2 97%   BMI 25.84 kg/m²     Constitutional:   Appears comfortable but ill looking and cachectic    HENT: Head: Normocephalic, atraumatic, Bilateral external ears normal, Oropharynx mosit, No oral exudates, Nose normal.   Eyes: PERRL, EOMI, Conjunctiva normal, No discharge. No scleral icterus  Neck: Normal range of motion, No tenderness, Supple  Lympatics: No lymphadenopathy. Cardiovascular: Normal rate, regular rhythm, S1 normal and S2 normal.  Exam reveals no gallop. Pulmonary/Chest: Effort is slightly increased, diminished lung sounds right lower lobe, mild respiratory distress with tachypnea.      Abdominal: Soft. Bowel sounds are normal.  exhibits no distension. There is no tenderness. There is no rebound and no guarding. Extremities: No edema, no tenderness, no cyanosis, no clubbing. Musculoskeletal: Good range of motion in major joints is observed. No major deformities noted. Neurological: Alert and oriented ×3, normal motor function, normal sensory function, no focal deficits. GCS 15. Skin: Skin is warm, dry and intact. No rash noted. No erythema.    Psychiatric: Affect normal, judgment normal, mood normal.    Labs      Recent Results (from the past 24 hour(s))   CBC Auto Differential    Collection Time: 01/27/22  1:05 PM   Result Value Ref Range    WBC 15.8 (H) 4.8 - 10.8 thou/mm3    RBC 5.26 4.70 - 6.10 mill/mm3    Hemoglobin 13.0 (L) 14.0 - 18.0 gm/dl    Hematocrit 40.1 (L) 42.0 - 52.0 %    MCV 76.2 (L) 80.0 - 94.0 fL    MCH 24.7 (L) 26.0 - 33.0 pg    MCHC 32.4 32.2 - 35.5 gm/dl    RDW-CV 15.3 (H) 11.5 - 14.5 %    RDW-SD 41.5 35.0 - 45.0 fL    Platelets 944 (H) 588 - 400 thou/mm3    MPV 9.4 9.4 - 12.4 fL    Seg Neutrophils 80.5 %    Lymphocytes 7.4 %    Monocytes 10.8 %    Eosinophils 0.6 %    Basophils 0.3 %    Immature Granulocytes 0.4 %    Segs Absolute 12.7 (H) 1.8 - 7.7 thou/mm3    Lymphocytes Absolute 1.2 1.0 - 4.8 thou/mm3    Monocytes Absolute 1.7 (H) 0.4 - 1.3 thou/mm3    Eosinophils Absolute 0.1 0.0 - 0.4 thou/mm3    Basophils Absolute 0.0 0.0 - 0.1 thou/mm3    Immature Grans (Abs) 0.07 0.00 - 0.07 thou/mm3    nRBC 0 /100 wbc   Basic Metabolic Panel w/ Reflex to MG    Collection Time: 01/27/22  1:05 PM   Result Value Ref Range    Sodium 136 135 - 145 meq/L    Potassium reflex Magnesium 4.2 3.5 - 5.2 meq/L    Chloride 100 98 - 111 meq/L    CO2 24 23 - 33 meq/L    Glucose 109 (H) 70 - 108 mg/dL    BUN 16 7 - 22 mg/dL    CREATININE 0.9 0.4 - 1.2 mg/dL    Calcium 9.2 8.5 - 10.5 mg/dL   Troponin    Collection Time: 01/27/22  1:05 PM   Result Value Ref Range    Troponin T < 0.010 ng/ml   Brain Natriuretic Peptide    Collection Time: 01/27/22  1:05 PM   Result Value Ref Range    Pro-.1 0.0 - 900.0 pg/mL   Anion Gap    Collection Time: 01/27/22  1:05 PM   Result Value Ref Range    Anion Gap 12.0 8.0 - 16.0 meq/L   Osmolality    Collection Time: 01/27/22  1:05 PM   Result Value Ref Range    Osmolality Calc 273.7 (L) 275.0 - 300.0 mOsmol/kg   Glomerular Filtration Rate, Estimated    Collection Time: 01/27/22  1:05 PM   Result Value Ref Range    Est, Glom Filt Rate >90 ml/min/1.73m2   Hepatic Function Panel    Collection Time: 01/27/22  1:05 PM   Result Value Ref Range    Albumin 3.9 3.5 - 5.1 g/dL    Total Bilirubin 0.6 0.3 - 1.2 mg/dL    Bilirubin, Direct <0.2 0.0 - 0.3 mg/dL    Alkaline Phosphatase 178 (H) 38 - 126 U/L    AST 22 5 - 40 U/L    ALT 19 11 - 66 U/L    Total Protein 7.7 6.1 - 8.0 g/dL   D-dimer, quantitative    Collection Time: 01/27/22  1:45 PM   Result Value Ref Range    D-Dimer, Quant 2181.00 (H) 0.00 - 500.00 ng/ml FEU   COVID-19, Rapid    Collection Time: 01/27/22  4:00 PM    Specimen: Nasopharyngeal Swab   Result Value Ref Range    SARS-CoV-2, NAAT DETECTED (AA) NOT DETECTED        Imaging/Diagnostics Last 24 Hours   XR CHEST (2 VW)    Result Date: 1/27/2022  PROCEDURE: XR CHEST (2 VW) CLINICAL INFORMATION: 52-year-old male with sharp chest pain. COMPARISON: No prior study. TECHNIQUE: PA and lateral views of the chest were obtained. FINDINGS: There is extensive pneumonia in the right mid and lower lung zone. There is a large right pleural effusion. The cardiac silhouette and pulmonary vasculature are within normal limits. There is no pneumothorax. Visualized portions of the upper abdomen are within normal limits. The osseous structures are intact. No acute fractures or suspicious osseous lesions. 1. Extensive pneumonia in the right mid and lower lung zone. 2. Large right pleural effusion. **This report has been created using voice recognition software. It may contain minor errors which are inherent in voice recognition technology. ** Final report electronically signed by Dr Lissette Joseph on 1/27/2022 1:58 PM    CTA CHEST W WO CONTRAST    Result Date: 1/27/2022  PROCEDURE: CTA CHEST W WO CONTRAST CLINICAL INFORMATION: 52-year-old male with chest pain and shortness of breath. Elevated d-dimer. Right rib pain. COMPARISON: No prior study. TECHNIQUE: 3 mm axial images were obtained through the chest after the administration of IV contrast.  A non-contrast localizer was obtained. 3D reconstructions were performed on the scanner to include MIP images through the right and left pulmonary arteries and sagittal images through the chest. Isovue was the intravenous contrast utilized. All CT scans at this facility use dose modulation, iterative reconstruction, and/or weight-based dosing when appropriate to reduce radiation dose to as low as reasonably achievable. FINDINGS: The thyroid gland is present. The central airways are patent. There is cardiomegaly. There is adequate opacification of the pulmonary arteries. There are no pulmonary emboli. There are emphysematous changes in the lungs, predominantly in the apices. There is a mass in the left upper lobe which measures 3.2 x 1.5 cm.  There is a somewhat loculated appearing pleural effusion on the right side. There are adjacent areas of consolidation which may be a combination of pneumonia and atelectasis. There is no pneumothorax. There is a lipoma overlying the right side of the chest which measures 2.3 x 0.7 cm. There are no suspicious findings in the upper abdominal fields. There is a cyst of the right kidney. The bones are intact. 1. There is a large somewhat loculated appearing pleural effusion on the right side. There are adjacent areas of consolidation which may represent a combination of pneumonia and atelectasis. 2. There is a left upper lobe pulmonary mass. 3. Emphysematous changes. **This report has been created using voice recognition software. It may contain minor errors which are inherent in voice recognition technology. ** Final report electronically signed by Dr Kathy Escobar on 1/27/2022 3:38 PM    CT ABDOMEN PELVIS W IV CONTRAST Additional Contrast? None    Result Date: 1/27/2022  PROCEDURE: CT ABDOMEN PELVIS W IV CONTRAST CLINICAL INFORMATION: abd pain, Trauma . COMPARISON: None. TECHNIQUE: Axial 5 mm CT images were obtained through the abdomen and pelvis after the administration of intravenous contrast. Coronal and sagittal reconstructions were obtained. All CT scans at this facility use dose modulation, iterative reconstruction, and/or weight-based dosing when appropriate to reduce radiation dose to as low as reasonably achievable. FINDINGS: There is a right pleural effusion. There is probable infiltrate in the right lower lobe. . There is soft tissue swelling around the right lateral chest wall and tip of the scapula  The base of the heart is within appropriate limits. The liver is normal. The spleen is normal. The adrenals and pancreas are normal. The gallbladder is normal.    There is a 2.6 cm cyst in the right kidney. There is a 7.3 mm cyst in the left kidney. . No renal masses are noted.   There is a small hiatal hernia No abnormalities of the small bowel loops are noted. The IVC and aorta are of normal caliber. There is no adenopathy. The urinary bladder is normal. There is pelvic free fluid. The colon is within normal limits. There is no adenopathy. No suspicious osseous lesions are identified. 1. Right pleural effusion. Probable infiltrate in the right lower lobe. 2. Soft tissue swelling overlying the right lateral chest wall and tip of scapula. 3. 2.6 cm right renal cyst. Small left renal cysts. 4. Small hiatal hernia. 5. There is free fluid within the pelvis. 6. Otherwise negative CT scan of the abdomen and pelvis. **This report has been created using voice recognition software. It may contain minor errors which are inherent in voice recognition technology. ** Final report electronically signed by DR Marcelle Tyson on 1/27/2022 3:39 PM      Assessment / Plan     1. Right lower lobe pneumonia  Admit to [facility] and start on IV antibiotics, monitor pulse ox continue oxygen to keep saturation above 93%    2. Right pleural effusion  Most likely due to pneumonia will consult IR for possible thoracentesis    3. Covid positive  Patient is not immunized, will start on Decadron and Lovenox  Currently patient is on room air hence we will hold baricitinib/remdesivir, check CRP and if elevated will order    4. Left upper lobe pulmonary mass  Referred to interventional radiology for possible biopsy consult oncology if necessary    4.   COPD with history of tobacco abuse  Encouraged to quit we will place nicotine patch and continue bronchodilator treatment along with IV steroids and antibiotics, oxygen supplementation as needed           Consultations Ordered:  None    Electronically signed by Kwasi Miller MD on 1/27/22 at 4:41 PM EST

## 2022-01-27 NOTE — ED TRIAGE NOTES
PT comes to ED with c/o of right side chest and rib pain that started last week sometime that has gotten progressively worse. PT states that his pain increases with movement and a deep breath. PT is taking shallow breaths at this time and seem to be in distress. Pt denies any injury that occurred. Pt states pain is 8/10 at this time. Call light is within reach.

## 2022-01-27 NOTE — ED PROVIDER NOTES
[facility] [address] [address] EMERGENCY DEPT      CHIEF COMPLAINT       Chief Complaint   Patient presents with    Chest Pain     right side that travels to right back        Nurses Notes reviewed and I agree except as noted in the HPI. HISTORY OF PRESENT ILLNESS    Sg Atwood is a 49 y.o. male who presents with complaint of right-sided chest pain, states the pain travels into his back and right upper abdomen. He has had pain for about a week. Pain is made worse with inspiration, truncal rotation. Pain is not made worse with palpation of the chest wall. No history of cancer. He smokes about half a pack per day. Onset: Acute  Duration: Approximately 1 week  Timing: Persistent  Location of Pain: Right chest pain  Intesity/severity: Moderate to severe  Modifying Factors: Worse with inspiration/truncal rotation  Relieved by;  Previous Episodes; Tx Before arrival: None  REVIEW OF SYSTEMS      Review of Systems   Constitutional: Negative for fever, chills, diaphoresis and fatigue. HENT: Negative for congestion, drooling, facial swelling and sore throat. Eyes: Negative for photophobia, pain and discharge. Respiratory: Positive for cough, shortness of breath; negative for wheezing and stridor. Positive for chest pain. Cardiovascular: Negative for chest pain, palpitations and leg swelling. Gastrointestinal: Negative for abdominal pain, blood in stool and abdominal distention. Genitourinary: Negative for dysuria, urgency, hematuria and difficulty urinating. Musculoskeletal: Negative for gait problem, neck pain and neck stiffness. Skin; No rash, No itching  Neurological: Negative for seizures, weakness and numbness. Hematological: Negative for adenopathy. Does not bruise/bleed easily. Psychiatric/Behavioral: Negative for hallucinations, confusion and agitation.      PAST MEDICAL HISTORY    has a past medical history of GERD (gastroesophageal reflux disease), Headache(784.0), Hyperlipidemia, and Hypertension. SURGICAL HISTORY      has a past surgical history that includes Mandible fracture surgery. CURRENT MEDICATIONS       Previous Medications    ACETAMINOPHEN (AMINOFEN) 325 MG TABLET    Take 2 tablets by mouth every 6 hours as needed for Pain    ATORVASTATIN (LIPITOR) 40 MG TABLET    take 1 tablet by mouth once daily    FLUTICASONE (FLONASE) 50 MCG/ACT NASAL SPRAY    1 spray by Each Nare route daily    LIDOCAINE VISCOUS (XYLOCAINE) 2 % SOLUTION    Take 5 mLs by mouth as needed for Dental Pain    LIDOCAINE VISCOUS HCL (XYLOCAINE) 2 % SOLN SOLUTION    Take 5 mLs by mouth as needed for Irritation    LISINOPRIL (PRINIVIL;ZESTRIL) 10 MG TABLET    Take 1 tablet by mouth daily    LORATADINE (CLARITIN) 10 MG TABLET    Take 1 tablet by mouth daily    NAPROXEN (NAPROSYN) 500 MG TABLET    Take 1 tablet by mouth 2 times daily (with meals) for 30 doses       ALLERGIES     is allergic to aspirin and motrin [ibuprofen micronized]. FAMILY HISTORY     He indicated that his mother is alive. He indicated that his father is alive. He indicated that the status of his sister is unknown.   family history includes Diabetes in his mother; Heart Disease in his mother; Hypertension in his mother. SOCIAL HISTORY      reports that he has been smoking cigarettes. He has a 5.60 pack-year smoking history. He has never used smokeless tobacco. He reports current alcohol use. He reports current drug use. Drug: Marijuana Rubén Flynn). PHYSICAL EXAM     INITIAL VITALS:  height is 5' 7\" (1.702 m) and weight is 165 lb (74.8 kg). His oral temperature is 98.5 °F (36.9 °C). His blood pressure is 149/85 (abnormal) and his pulse is 111. His respiration is 26 and oxygen saturation is 96%. Physical Exam   Constitutional:  well-developed and well-nourished. HENT: Head: Normocephalic, atraumatic, Bilateral external ears normal, Oropharynx mosit, No oral exudates, Nose normal.   Eyes: PERRL, EOMI, Conjunctiva normal, No discharge.  No scleral icterus  Neck: Normal range of motion, No tenderness, Supple  Lympatics: No lymphadenopathy. Cardiovascular: Normal rate, regular rhythm, S1 normal and S2 normal.  Exam reveals no gallop. Pulmonary/Chest: Effort is slightly increased, diminished lung sounds right lower lobe, mild respiratory distress with tachypnea. Abdominal: Soft. Bowel sounds are normal.  exhibits no distension. There is no tenderness. There is no rebound and no guarding. Extremities: No edema, no tenderness, no cyanosis, no clubbing. Musculoskeletal: Good range of motion in major joints is observed. No major deformities noted. Neurological: Alert and oriented ×3, normal motor function, normal sensory function, no focal deficits. GCS 15. Skin: Skin is warm, dry and intact. No rash noted. No erythema. Psychiatric: Affect normal, judgment normal, mood normal.  DIFFERENTIAL DIAGNOSIS:       DIAGNOSTIC RESULTS     EKG: All EKG's are interpreted by the Emergency Department Physician who either signs or Co-signs this chart in the absence of a cardiologist.      RADIOLOGY: non-plain film images(s) such as CT, Ultrasound and MRI are read by the radiologist.  Plain radiographic images are visualized and preliminarily interpreted by the emergency physician unless otherwise stated below.       LABS:   Labs Reviewed   COVID-19, RAPID - Abnormal; Notable for the following components:       Result Value    SARS-CoV-2, NAAT DETECTED (*)     All other components within normal limits   CBC WITH AUTO DIFFERENTIAL - Abnormal; Notable for the following components:    WBC 15.8 (*)     Hemoglobin 13.0 (*)     Hematocrit 40.1 (*)     MCV 76.2 (*)     MCH 24.7 (*)     RDW-CV 15.3 (*)     Platelets 200 (*)     Segs Absolute 12.7 (*)     Monocytes Absolute 1.7 (*)     All other components within normal limits   BASIC METABOLIC PANEL W/ REFLEX TO MG FOR LOW K - Abnormal; Notable for the following components:    Glucose 109 (*)     All other components within normal limits   D-DIMER, QUANTITATIVE - Abnormal; Notable for the following components:    D-Dimer, Quant 2181.00 (*)     All other components within normal limits   OSMOLALITY - Abnormal; Notable for the following components:    Osmolality Calc 273.7 (*)     All other components within normal limits   HEPATIC FUNCTION PANEL - Abnormal; Notable for the following components:    Alkaline Phosphatase 178 (*)     All other components within normal limits   CULTURE, BLOOD 1   CULTURE, BLOOD 2   TROPONIN   BRAIN NATRIURETIC PEPTIDE   ANION GAP   GLOMERULAR FILTRATION RATE, ESTIMATED   C-REACTIVE PROTEIN       EMERGENCY DEPARTMENT COURSE:   Vitals:    Vitals:    01/27/22 1400 01/27/22 1556 01/27/22 1800 01/27/22 1930   BP: (!) 171/92 (!) 157/91 (!) 154/93 (!) 149/85   Pulse: 90 96 92 111   Resp: (!) 32 28 29 26   Temp:       TempSrc:       SpO2: 97% 97% 95% 96%   Weight:       Height:             CRITICAL CARE:       CONSULTS:  None    PROCEDURES:  None    FINAL IMPRESSION      1. Mass of left lung    2. Pneumonia of right lower lobe due to infectious organism    3. COVID-19 virus infection          DISPOSITION/PLAN   Admitted    PATIENT REFERRED TO:  No follow-up provider specified.     DISCHARGE MEDICATIONS:  New Prescriptions    No medications on file       (Please note that portions of this note were completed with a voice recognition program.  Efforts were made to edit the dictations but occasionally words are mis-transcribed.)    Darion Espinal, [address] [place] [address],   01/27/22 3975

## 2022-01-27 NOTE — ED NOTES
Meal tray ordered for patient. Pt provided with extra pillow for comfort. Call light in reach.       Chaitanya Miranda RN  01/27/22 3000

## 2022-01-28 ENCOUNTER — APPOINTMENT (OUTPATIENT)
Dept: ULTRASOUND IMAGING | Age: 52
DRG: 137 | End: 2022-01-28
Payer: MEDICARE

## 2022-01-28 ENCOUNTER — APPOINTMENT (OUTPATIENT)
Dept: GENERAL RADIOLOGY | Age: 52
DRG: 137 | End: 2022-01-28
Payer: MEDICARE

## 2022-01-28 LAB
BASOPHILS # BLD: 0.3 %
BASOPHILS ABSOLUTE: 0 THOU/MM3 (ref 0–0.1)
C-REACTIVE PROTEIN: 19.87 MG/DL (ref 0–1)
D-DIMER QUANTITATIVE: 5401 NG/ML FEU (ref 0–500)
EOSINOPHIL # BLD: 0 %
EOSINOPHILS ABSOLUTE: 0 THOU/MM3 (ref 0–0.4)
ERYTHROCYTE [DISTWIDTH] IN BLOOD BY AUTOMATED COUNT: 15.2 % (ref 11.5–14.5)
ERYTHROCYTE [DISTWIDTH] IN BLOOD BY AUTOMATED COUNT: 15.4 % (ref 11.5–14.5)
ERYTHROCYTE [DISTWIDTH] IN BLOOD BY AUTOMATED COUNT: 43.2 FL (ref 35–45)
ERYTHROCYTE [DISTWIDTH] IN BLOOD BY AUTOMATED COUNT: 43.8 FL (ref 35–45)
GLUCOSE BLD-MCNC: 228 MG/DL (ref 70–108)
GLUCOSE BLD-MCNC: 263 MG/DL (ref 70–108)
GLUCOSE BLD-MCNC: 467 MG/DL (ref 70–108)
GLUCOSE, FLUID: 235 MG/DL
HCT VFR BLD CALC: 37.8 % (ref 42–52)
HCT VFR BLD CALC: 38.1 % (ref 42–52)
HEMOGLOBIN: 11.7 GM/DL (ref 14–18)
HEMOGLOBIN: 12 GM/DL (ref 14–18)
IMMATURE GRANS (ABS): 0.07 THOU/MM3 (ref 0–0.07)
IMMATURE GRANULOCYTES: 0.5 %
LACTIC ACID, SEPSIS: 1.3 MMOL/L (ref 0.5–1.9)
LD, FLUID: 958 U/L
LYMPHOCYTES # BLD: 4 %
LYMPHOCYTES ABSOLUTE: 0.6 THOU/MM3 (ref 1–4.8)
MCH RBC QN AUTO: 24.2 PG (ref 26–33)
MCH RBC QN AUTO: 24.9 PG (ref 26–33)
MCHC RBC AUTO-ENTMCNC: 30.7 GM/DL (ref 32.2–35.5)
MCHC RBC AUTO-ENTMCNC: 31.7 GM/DL (ref 32.2–35.5)
MCV RBC AUTO: 78.6 FL (ref 80–94)
MCV RBC AUTO: 78.9 FL (ref 80–94)
MONOCYTES # BLD: 8.2 %
MONOCYTES ABSOLUTE: 1.2 THOU/MM3 (ref 0.4–1.3)
NUCLEATED RED BLOOD CELLS: 0 /100 WBC
PH FLUID: 7.47
PLATELET # BLD: 617 THOU/MM3 (ref 130–400)
PLATELET # BLD: 637 THOU/MM3 (ref 130–400)
PMV BLD AUTO: 9.1 FL (ref 9.4–12.4)
PMV BLD AUTO: 9.2 FL (ref 9.4–12.4)
PROTEIN FLUID: 4 GM/DL
RBC # BLD: 4.81 MILL/MM3 (ref 4.7–6.1)
RBC # BLD: 4.83 MILL/MM3 (ref 4.7–6.1)
SEG NEUTROPHILS: 87 %
SEGMENTED NEUTROPHILS ABSOLUTE COUNT: 13.2 THOU/MM3 (ref 1.8–7.7)
WBC # BLD: 15.2 THOU/MM3 (ref 4.8–10.8)
WBC # BLD: 17.3 THOU/MM3 (ref 4.8–10.8)

## 2022-01-28 PROCEDURE — 71045 X-RAY EXAM CHEST 1 VIEW: CPT

## 2022-01-28 PROCEDURE — 87186 SC STD MICRODIL/AGAR DIL: CPT

## 2022-01-28 PROCEDURE — 87205 SMEAR GRAM STAIN: CPT

## 2022-01-28 PROCEDURE — 88305 TISSUE EXAM BY PATHOLOGIST: CPT

## 2022-01-28 PROCEDURE — 6360000002 HC RX W HCPCS: Performed by: INTERNAL MEDICINE

## 2022-01-28 PROCEDURE — 83615 LACTATE (LD) (LDH) ENZYME: CPT

## 2022-01-28 PROCEDURE — 83986 ASSAY PH BODY FLUID NOS: CPT

## 2022-01-28 PROCEDURE — 87070 CULTURE OTHR SPECIMN AEROBIC: CPT

## 2022-01-28 PROCEDURE — 99233 SBSQ HOSP IP/OBS HIGH 50: CPT | Performed by: INTERNAL MEDICINE

## 2022-01-28 PROCEDURE — 82945 GLUCOSE OTHER FLUID: CPT

## 2022-01-28 PROCEDURE — 83605 ASSAY OF LACTIC ACID: CPT

## 2022-01-28 PROCEDURE — 32555 ASPIRATE PLEURA W/ IMAGING: CPT

## 2022-01-28 PROCEDURE — 85379 FIBRIN DEGRADATION QUANT: CPT

## 2022-01-28 PROCEDURE — 82948 REAGENT STRIP/BLOOD GLUCOSE: CPT

## 2022-01-28 PROCEDURE — 87075 CULTR BACTERIA EXCEPT BLOOD: CPT

## 2022-01-28 PROCEDURE — 88112 CYTOPATH CELL ENHANCE TECH: CPT

## 2022-01-28 PROCEDURE — 84157 ASSAY OF PROTEIN OTHER: CPT

## 2022-01-28 PROCEDURE — 87040 BLOOD CULTURE FOR BACTERIA: CPT

## 2022-01-28 PROCEDURE — 36415 COLL VENOUS BLD VENIPUNCTURE: CPT

## 2022-01-28 PROCEDURE — 6370000000 HC RX 637 (ALT 250 FOR IP): Performed by: INTERNAL MEDICINE

## 2022-01-28 PROCEDURE — 1200000000 HC SEMI PRIVATE

## 2022-01-28 PROCEDURE — 85027 COMPLETE CBC AUTOMATED: CPT

## 2022-01-28 PROCEDURE — XW0DXM6 INTRODUCTION OF BARICITINIB INTO MOUTH AND PHARYNX, EXTERNAL APPROACH, NEW TECHNOLOGY GROUP 6: ICD-10-PCS | Performed by: INTERNAL MEDICINE

## 2022-01-28 PROCEDURE — 2580000003 HC RX 258: Performed by: INTERNAL MEDICINE

## 2022-01-28 PROCEDURE — 85025 COMPLETE CBC W/AUTO DIFF WBC: CPT

## 2022-01-28 PROCEDURE — 87147 CULTURE TYPE IMMUNOLOGIC: CPT

## 2022-01-28 PROCEDURE — 87077 CULTURE AEROBIC IDENTIFY: CPT

## 2022-01-28 PROCEDURE — 86140 C-REACTIVE PROTEIN: CPT

## 2022-01-28 RX ORDER — LISINOPRIL 20 MG/1
20 TABLET ORAL DAILY
Status: DISCONTINUED | OUTPATIENT
Start: 2022-01-29 | End: 2022-01-29

## 2022-01-28 RX ORDER — HYDROCHLOROTHIAZIDE 25 MG/1
25 TABLET ORAL DAILY
Status: DISCONTINUED | OUTPATIENT
Start: 2022-01-28 | End: 2022-02-02 | Stop reason: HOSPADM

## 2022-01-28 RX ORDER — DEXAMETHASONE SODIUM PHOSPHATE 4 MG/ML
10 INJECTION, SOLUTION INTRA-ARTICULAR; INTRALESIONAL; INTRAMUSCULAR; INTRAVENOUS; SOFT TISSUE EVERY 24 HOURS
Status: DISCONTINUED | OUTPATIENT
Start: 2022-01-28 | End: 2022-02-02 | Stop reason: HOSPADM

## 2022-01-28 RX ORDER — DEXTROSE MONOHYDRATE 50 MG/ML
100 INJECTION, SOLUTION INTRAVENOUS PRN
Status: DISCONTINUED | OUTPATIENT
Start: 2022-01-28 | End: 2022-02-02 | Stop reason: HOSPADM

## 2022-01-28 RX ORDER — GUAIFENESIN 600 MG/1
600 TABLET, EXTENDED RELEASE ORAL EVERY 6 HOURS PRN
Status: DISCONTINUED | OUTPATIENT
Start: 2022-01-28 | End: 2022-02-02 | Stop reason: HOSPADM

## 2022-01-28 RX ORDER — LISINOPRIL 10 MG/1
10 TABLET ORAL ONCE
Status: COMPLETED | OUTPATIENT
Start: 2022-01-28 | End: 2022-01-28

## 2022-01-28 RX ORDER — DEXTROSE MONOHYDRATE 25 G/50ML
12.5 INJECTION, SOLUTION INTRAVENOUS PRN
Status: DISCONTINUED | OUTPATIENT
Start: 2022-01-28 | End: 2022-02-02 | Stop reason: HOSPADM

## 2022-01-28 RX ORDER — NICOTINE POLACRILEX 4 MG
15 LOZENGE BUCCAL PRN
Status: DISCONTINUED | OUTPATIENT
Start: 2022-01-28 | End: 2022-02-02 | Stop reason: HOSPADM

## 2022-01-28 RX ORDER — INSULIN GLARGINE 100 [IU]/ML
10 INJECTION, SOLUTION SUBCUTANEOUS NIGHTLY
Status: DISCONTINUED | OUTPATIENT
Start: 2022-01-28 | End: 2022-01-29

## 2022-01-28 RX ORDER — TRAMADOL HYDROCHLORIDE 50 MG/1
50 TABLET ORAL EVERY 6 HOURS PRN
Status: DISCONTINUED | OUTPATIENT
Start: 2022-01-28 | End: 2022-02-02 | Stop reason: HOSPADM

## 2022-01-28 RX ADMIN — GUAIFENESIN 600 MG: 600 TABLET, EXTENDED RELEASE ORAL at 11:11

## 2022-01-28 RX ADMIN — LISINOPRIL 10 MG: 10 TABLET ORAL at 14:06

## 2022-01-28 RX ADMIN — INSULIN LISPRO 3 UNITS: 100 INJECTION, SOLUTION INTRAVENOUS; SUBCUTANEOUS at 20:37

## 2022-01-28 RX ADMIN — HYDROCHLOROTHIAZIDE 25 MG: 25 TABLET ORAL at 14:06

## 2022-01-28 RX ADMIN — LISINOPRIL 10 MG: 10 TABLET ORAL at 09:28

## 2022-01-28 RX ADMIN — DEXAMETHASONE SODIUM PHOSPHATE 10 MG: 4 INJECTION, SOLUTION INTRAMUSCULAR; INTRAVENOUS at 11:10

## 2022-01-28 RX ADMIN — ACETAMINOPHEN 650 MG: 325 TABLET ORAL at 09:28

## 2022-01-28 RX ADMIN — SODIUM CHLORIDE: 9 INJECTION, SOLUTION INTRAVENOUS at 11:10

## 2022-01-28 RX ADMIN — ENOXAPARIN SODIUM 30 MG: 100 INJECTION SUBCUTANEOUS at 20:36

## 2022-01-28 RX ADMIN — ATORVASTATIN CALCIUM 10 MG: 10 TABLET, FILM COATED ORAL at 20:37

## 2022-01-28 RX ADMIN — INSULIN GLARGINE 10 UNITS: 100 INJECTION, SOLUTION SUBCUTANEOUS at 20:46

## 2022-01-28 RX ADMIN — ENOXAPARIN SODIUM 30 MG: 100 INJECTION SUBCUTANEOUS at 09:28

## 2022-01-28 RX ADMIN — LEVOFLOXACIN 750 MG: 5 INJECTION, SOLUTION INTRAVENOUS at 17:48

## 2022-01-28 ASSESSMENT — PAIN SCALES - GENERAL
PAINLEVEL_OUTOF10: 0
PAINLEVEL_OUTOF10: 6
PAINLEVEL_OUTOF10: 6
PAINLEVEL_OUTOF10: 0
PAINLEVEL_OUTOF10: 6
PAINLEVEL_OUTOF10: 4
PAINLEVEL_OUTOF10: 0

## 2022-01-28 ASSESSMENT — PAIN DESCRIPTION - DIRECTION: RADIATING_TOWARDS: FLANK

## 2022-01-28 ASSESSMENT — PAIN - FUNCTIONAL ASSESSMENT
PAIN_FUNCTIONAL_ASSESSMENT: ACTIVITIES ARE NOT PREVENTED
PAIN_FUNCTIONAL_ASSESSMENT: ACTIVITIES ARE NOT PREVENTED

## 2022-01-28 ASSESSMENT — PAIN DESCRIPTION - FREQUENCY
FREQUENCY: INTERMITTENT
FREQUENCY: INTERMITTENT

## 2022-01-28 ASSESSMENT — PAIN DESCRIPTION - PAIN TYPE
TYPE: ACUTE PAIN
TYPE: ACUTE PAIN

## 2022-01-28 ASSESSMENT — PAIN DESCRIPTION - ORIENTATION
ORIENTATION: RIGHT
ORIENTATION: RIGHT

## 2022-01-28 ASSESSMENT — PAIN DESCRIPTION - LOCATION
LOCATION: CHEST;FLANK
LOCATION: CHEST

## 2022-01-28 ASSESSMENT — PAIN DESCRIPTION - DESCRIPTORS
DESCRIPTORS: SHARP
DESCRIPTORS: SHARP

## 2022-01-28 ASSESSMENT — PAIN DESCRIPTION - ONSET: ONSET: GRADUAL

## 2022-01-28 ASSESSMENT — PAIN DESCRIPTION - PROGRESSION: CLINICAL_PROGRESSION: GRADUALLY WORSENING

## 2022-01-28 NOTE — CARE COORDINATION
1/28/22, 7:43 AM EST  DISCHARGE PLANNING EVALUATION:    Hyacinth Sweeney       Admitted: 1/27/2022/ [place] day: 1   Location: [facility] Reason for admit: Pneumonia [J18.9]  Pneumonia of right lower lobe due to infectious organism [J18.9]  Mass of left lung [R91.8]  COVID-19 virus infection [U07.1]   PMH:  has a past medical history of GERD (gastroesophageal reflux disease), Headache(784.0), Hyperlipidemia, and Hypertension. Procedure: Planning Thoracentesis today. Barriers to Discharge: To ER with rt sided CP into his back. Smoker. Found Covid +. T 99.6. CRP is elevated as well as D-Dimer. Rt pleural effusion. Thoracentesis ordered. PCP: Rafita Tubbs MD  Readmission Risk Score: 7.3 ( )%    Patient Goals/Plan/Treatment Preferences: Met with Apolinar today. He is from home with a girlfriend and pt states they both have Payees. He states he has no home services or DME. He stresses that he needs a BP cuff as he gets very nervous about taking BP meds without first checking his BP (fears being low). He states all of his money is used for his bills and his animals (dogs) and no $ for BP cuff. Advised he speak to his payee about this as well as his PCP at [facility]. He has a PCP but would like to switch to the [facility] on [facility] as it is closer to his home and he lacks transportation. He gets meds without issues. Pt does occasionally use his insurance to help provide transportation. Phoned [facility] on [address] and left voice message at 11:00 am requesting phone call back for this CM to get pt appt to get established in that office. Transportation/Food Security/Housekeeping Addressed:  No issues identified.

## 2022-01-28 NOTE — PROGRESS NOTES
Patient returned from Ultrasound s/p thoracentesis, site to right upper back with bandage and small amount of shadowing noted. Patient is alert and oriented no acute distress noted.

## 2022-01-28 NOTE — PROGRESS NOTES
Notified Dr Cydney Roberson of patient blood sugar of 263, no new orders at this time. Patient is alert and oriented, asymptomatic.

## 2022-01-28 NOTE — PLAN OF CARE
Problem: Airway Clearance - Ineffective  Goal: Achieve or maintain patent airway  Outcome: Ongoing     Problem: Gas Exchange - Impaired  Goal: Absence of hypoxia  Outcome: Ongoing  Goal: Promote optimal lung function  Outcome: Ongoing     Problem: Isolation Precautions - Risk of Spread of Infection  Goal: Prevent transmission of infection  Outcome: Ongoing     Problem: Fatigue  Goal: Verbalize increase energy and improved vitality  Outcome: Ongoing     Problem: Pain:  Goal: Pain level will decrease  Description: Pain level will decrease  Outcome: Ongoing  Goal: Control of acute pain  Description: Control of acute pain  Outcome: Ongoing

## 2022-01-28 NOTE — PROGRESS NOTES
Hospitalist Progress Note  [facility] Med Surg 8B       Patient: Sana Miller  Unit/Bed: 8B-23/023-A  YOB: 1970  MRN: [id number]  Acct: [de-identified]   AdmittingDiagnosis: Pneumonia [J18.9]  Pneumonia of right lower lobe due to infectious organism [J18.9]  Mass of left lung [R91.8]  COVID-19 virus infection [U07.1]  Admit Date: 1/27/2022  Hospital Day: 1    Subjective:    Feels a bit short of breath but not hypoxic stable on room air 96% however his blood pressure is elevated significantly per RN    Objective:   BP (!) 144/104   Pulse 94   Temp 98.1 °F (36.7 °C) (Oral)   Resp 20   Ht 5' 7\" (1.702 m)   Wt 165 lb (74.8 kg)   SpO2 97%   BMI 25.84 kg/m²     Intake/Output Summary (Last 24 hours) at 1/28/2022 1156  Last data filed at 1/28/2022 0341  Gross per 24 hour   Intake 600 ml   Output 700 ml   Net -100 ml     Physical Exam    Constitutional:   Appears comfortable but ill looking and cachectic     HENT: Head: Normocephalic, atraumatic, Bilateral external ears normal, Oropharynx mosit, No oral exudates, Nose normal.   Eyes: PERRL, EOMI, Conjunctiva normal, No discharge. No scleral icterus  Neck: Normal range of motion, No tenderness, Supple  Lympatics: No lymphadenopathy. Cardiovascular: Normal rate, regular rhythm, S1 normal and S2 normal.  Exam reveals no gallop.    Pulmonary/Chest: Effort is slightly increased, diminished lung sounds right lower lobe, mild respiratory distress with tachypnea.      Abdominal: Soft. Bowel sounds are normal.  exhibits no distension. There is no tenderness. There is no rebound and no guarding. Extremities: No edema, no tenderness, no cyanosis, no clubbing.    Musculoskeletal: Good range of motion in major joints is observed.  No major deformities noted. Neurological: Alert and oriented ×3, normal motor function, normal sensory function, no focal deficits.  GCS 15. Skin: Skin is warm, dry and intact. No rash noted. No erythema.    Psychiatric: Affect normal, judgment normal, mood normal.  DVT Prophylaxis: Lovenox      Data:  CBC:   Lab Results   Component Value Date    WBC 15.2 01/28/2022    HGB 11.7 01/28/2022    HCT 38.1 01/28/2022    MCV 78.9 01/28/2022     01/28/2022     BMP:   Lab Results   Component Value Date     01/27/2022    K 4.2 01/27/2022     01/27/2022    CO2 24 01/27/2022    BUN 16 01/27/2022    CREATININE 0.9 01/27/2022    CALCIUM 9.2 01/27/2022     ABGs: No results found for: PH, PCO2, PO2, HCO3, O2SAT  Troponin: No results found for: TROPONINI   LFTs   Lab Results   Component Value Date    AST 22 01/27/2022    ALT 19 01/27/2022    BILITOT 0.6 01/27/2022    ALKPHOS 178 01/27/2022          Imaging   XR CHEST (2 VW)    Result Date: 1/27/2022  PROCEDURE: XR CHEST (2 VW) CLINICAL INFORMATION: 60-year-old male with sharp chest pain. COMPARISON: No prior study. TECHNIQUE: PA and lateral views of the chest were obtained. FINDINGS: There is extensive pneumonia in the right mid and lower lung zone. There is a large right pleural effusion. The cardiac silhouette and pulmonary vasculature are within normal limits. There is no pneumothorax. Visualized portions of the upper abdomen are within normal limits. The osseous structures are intact. No acute fractures or suspicious osseous lesions. 1. Extensive pneumonia in the right mid and lower lung zone. 2. Large right pleural effusion. **This report has been created using voice recognition software. It may contain minor errors which are inherent in voice recognition technology. ** Final report electronically signed by Dr Lissette Joseph on 1/27/2022 1:58 PM    CTA CHEST W WO CONTRAST    Result Date: 1/27/2022  PROCEDURE: CTA CHEST W WO CONTRAST CLINICAL INFORMATION: 60-year-old male with chest pain and shortness of breath. Elevated d-dimer. Right rib pain. COMPARISON: No prior study.  TECHNIQUE: 3 mm axial images were obtained through the chest after the administration of IV contrast.  A non-contrast localizer was obtained. 3D reconstructions were performed on the scanner to include MIP images through the right and left pulmonary arteries and sagittal images through the chest. Isovue was the intravenous contrast utilized. All CT scans at this facility use dose modulation, iterative reconstruction, and/or weight-based dosing when appropriate to reduce radiation dose to as low as reasonably achievable. FINDINGS: The thyroid gland is present. The central airways are patent. There is cardiomegaly. There is adequate opacification of the pulmonary arteries. There are no pulmonary emboli. There are emphysematous changes in the lungs, predominantly in the apices. There is a mass in the left upper lobe which measures 3.2 x 1.5 cm. There is a somewhat loculated appearing pleural effusion on the right side. There are adjacent areas of consolidation which may be a combination of pneumonia and atelectasis. There is no pneumothorax. There is a lipoma overlying the right side of the chest which measures 2.3 x 0.7 cm. There are no suspicious findings in the upper abdominal fields. There is a cyst of the right kidney. The bones are intact. 1. There is a large somewhat loculated appearing pleural effusion on the right side. There are adjacent areas of consolidation which may represent a combination of pneumonia and atelectasis. 2. There is a left upper lobe pulmonary mass. 3. Emphysematous changes. **This report has been created using voice recognition software. It may contain minor errors which are inherent in voice recognition technology. ** Final report electronically signed by Dr Goyo Altamirano on 1/27/2022 3:38 PM    CT ABDOMEN PELVIS W IV CONTRAST Additional Contrast? None    Result Date: 1/27/2022  PROCEDURE: CT ABDOMEN PELVIS W IV CONTRAST CLINICAL INFORMATION: abd pain, Trauma . COMPARISON: None.  TECHNIQUE: Axial 5 mm CT images were obtained through the abdomen and pelvis after the administration of intravenous contrast. Coronal and sagittal reconstructions were obtained. All CT scans at this facility use dose modulation, iterative reconstruction, and/or weight-based dosing when appropriate to reduce radiation dose to as low as reasonably achievable. FINDINGS: There is a right pleural effusion. There is probable infiltrate in the right lower lobe. . There is soft tissue swelling around the right lateral chest wall and tip of the scapula  The base of the heart is within appropriate limits. The liver is normal. The spleen is normal. The adrenals and pancreas are normal. The gallbladder is normal.    There is a 2.6 cm cyst in the right kidney. There is a 7.3 mm cyst in the left kidney. . No renal masses are noted. There is a small hiatal hernia No abnormalities of the small bowel loops are noted. The IVC and aorta are of normal caliber. There is no adenopathy. The urinary bladder is normal. There is pelvic free fluid. The colon is within normal limits. There is no adenopathy. No suspicious osseous lesions are identified. 1. Right pleural effusion. Probable infiltrate in the right lower lobe. 2. Soft tissue swelling overlying the right lateral chest wall and tip of scapula. 3. 2.6 cm right renal cyst. Small left renal cysts. 4. Small hiatal hernia. 5. There is free fluid within the pelvis. 6. Otherwise negative CT scan of the abdomen and pelvis. **This report has been created using voice recognition software. It may contain minor errors which are inherent in voice recognition technology. ** Final report electronically signed by DR Margie Walters on 1/27/2022 3:39 PM      Assessment/Plan:    1. Right lower lobe pneumonia  Minimally improved, will continue IV antibiotics,      2.  Right pleural effusion  Most likely due to pneumonia will consult IR for possible thoracentesis, test on pleural fluid ordered      3.   Covid positive  Patient is not immunized, However significantly elevated Crp and D-Dimer, cont Decadron and Lovenox  Currently patient is on room air hence we will hold baricitinib/remdesivir,    4. Left upper lobe pulmonary mass  Referred to interventional radiology for possible biopsy consult oncology if necessary     4.   COPD with history of tobacco abuse  Encouraged to quit we will place nicotine patch and continue bronchodilator treatment along with IV steroids and antibiotics, oxygen supplementation as needed        Electronically signed by Shayy Barroso MD on 1/28/2022 at 11:56 AM    Rounding Hospitalist

## 2022-01-29 LAB
C-REACTIVE PROTEIN: 11.48 MG/DL (ref 0–1)
GLUCOSE BLD-MCNC: 114 MG/DL (ref 70–108)
GLUCOSE BLD-MCNC: 194 MG/DL (ref 70–108)
GLUCOSE BLD-MCNC: 281 MG/DL (ref 70–108)
GLUCOSE BLD-MCNC: 82 MG/DL (ref 70–108)

## 2022-01-29 PROCEDURE — 1200000000 HC SEMI PRIVATE

## 2022-01-29 PROCEDURE — 86140 C-REACTIVE PROTEIN: CPT

## 2022-01-29 PROCEDURE — 6360000002 HC RX W HCPCS: Performed by: INTERNAL MEDICINE

## 2022-01-29 PROCEDURE — 6370000000 HC RX 637 (ALT 250 FOR IP): Performed by: INTERNAL MEDICINE

## 2022-01-29 PROCEDURE — 82948 REAGENT STRIP/BLOOD GLUCOSE: CPT

## 2022-01-29 PROCEDURE — 36415 COLL VENOUS BLD VENIPUNCTURE: CPT

## 2022-01-29 PROCEDURE — 99233 SBSQ HOSP IP/OBS HIGH 50: CPT | Performed by: INTERNAL MEDICINE

## 2022-01-29 PROCEDURE — 2580000003 HC RX 258: Performed by: INTERNAL MEDICINE

## 2022-01-29 RX ORDER — INSULIN GLARGINE 100 [IU]/ML
10 INJECTION, SOLUTION SUBCUTANEOUS 2 TIMES DAILY
Status: DISCONTINUED | OUTPATIENT
Start: 2022-01-29 | End: 2022-01-30

## 2022-01-29 RX ADMIN — TRAMADOL HYDROCHLORIDE 50 MG: 50 TABLET, COATED ORAL at 17:01

## 2022-01-29 RX ADMIN — ENOXAPARIN SODIUM 70 MG: 100 INJECTION SUBCUTANEOUS at 20:03

## 2022-01-29 RX ADMIN — INSULIN LISPRO 2 UNITS: 100 INJECTION, SOLUTION INTRAVENOUS; SUBCUTANEOUS at 21:00

## 2022-01-29 RX ADMIN — GUAIFENESIN 600 MG: 600 TABLET, EXTENDED RELEASE ORAL at 08:47

## 2022-01-29 RX ADMIN — TRAMADOL HYDROCHLORIDE 50 MG: 50 TABLET, COATED ORAL at 02:14

## 2022-01-29 RX ADMIN — ACETAMINOPHEN 650 MG: 325 TABLET ORAL at 20:03

## 2022-01-29 RX ADMIN — INSULIN GLARGINE 10 UNITS: 100 INJECTION, SOLUTION SUBCUTANEOUS at 11:11

## 2022-01-29 RX ADMIN — ATORVASTATIN CALCIUM 10 MG: 10 TABLET, FILM COATED ORAL at 20:03

## 2022-01-29 RX ADMIN — SODIUM CHLORIDE: 9 INJECTION, SOLUTION INTRAVENOUS at 03:56

## 2022-01-29 RX ADMIN — GUAIFENESIN 600 MG: 600 TABLET, EXTENDED RELEASE ORAL at 20:03

## 2022-01-29 RX ADMIN — INSULIN GLARGINE 10 UNITS: 100 INJECTION, SOLUTION SUBCUTANEOUS at 21:00

## 2022-01-29 RX ADMIN — HYDROCHLOROTHIAZIDE 25 MG: 25 TABLET ORAL at 08:47

## 2022-01-29 RX ADMIN — ENOXAPARIN SODIUM 70 MG: 100 INJECTION SUBCUTANEOUS at 08:48

## 2022-01-29 RX ADMIN — DEXAMETHASONE SODIUM PHOSPHATE 10 MG: 4 INJECTION, SOLUTION INTRAMUSCULAR; INTRAVENOUS at 10:53

## 2022-01-29 RX ADMIN — INSULIN LISPRO 1 UNITS: 100 INJECTION, SOLUTION INTRAVENOUS; SUBCUTANEOUS at 17:00

## 2022-01-29 RX ADMIN — LISINOPRIL 30 MG: 20 TABLET ORAL at 10:53

## 2022-01-29 RX ADMIN — LEVOFLOXACIN 750 MG: 5 INJECTION, SOLUTION INTRAVENOUS at 17:02

## 2022-01-29 ASSESSMENT — PAIN SCALES - GENERAL
PAINLEVEL_OUTOF10: 7
PAINLEVEL_OUTOF10: 0
PAINLEVEL_OUTOF10: 0
PAINLEVEL_OUTOF10: 3
PAINLEVEL_OUTOF10: 0
PAINLEVEL_OUTOF10: 6
PAINLEVEL_OUTOF10: 2
PAINLEVEL_OUTOF10: 0

## 2022-01-29 ASSESSMENT — PAIN DESCRIPTION - PAIN TYPE
TYPE: ACUTE PAIN
TYPE: ACUTE PAIN

## 2022-01-29 ASSESSMENT — PAIN DESCRIPTION - ONSET: ONSET: GRADUAL

## 2022-01-29 ASSESSMENT — PAIN DESCRIPTION - DESCRIPTORS
DESCRIPTORS: SHARP
DESCRIPTORS: ACHING

## 2022-01-29 ASSESSMENT — PAIN - FUNCTIONAL ASSESSMENT: PAIN_FUNCTIONAL_ASSESSMENT: ACTIVITIES ARE NOT PREVENTED

## 2022-01-29 ASSESSMENT — PAIN DESCRIPTION - LOCATION
LOCATION: FLANK
LOCATION: FLANK

## 2022-01-29 ASSESSMENT — PAIN DESCRIPTION - ORIENTATION
ORIENTATION: RIGHT
ORIENTATION: RIGHT

## 2022-01-29 ASSESSMENT — PAIN DESCRIPTION - PROGRESSION: CLINICAL_PROGRESSION: GRADUALLY WORSENING

## 2022-01-29 ASSESSMENT — PAIN DESCRIPTION - FREQUENCY
FREQUENCY: INTERMITTENT
FREQUENCY: INTERMITTENT

## 2022-01-29 ASSESSMENT — PAIN DESCRIPTION - DIRECTION: RADIATING_TOWARDS: BACK

## 2022-01-29 NOTE — PROGRESS NOTES
Hospitalist notified of BP and blood glucose  readings. Patient asymptomatic. No new orders received at this time. Will continune to monitor.

## 2022-01-29 NOTE — PROGRESS NOTES
Patient's mother Leanna updated and provided with patient's room information including room phone number. Leanna had no questions or concerns at this time.

## 2022-01-29 NOTE — PROGRESS NOTES
Hospitalist Progress Note  [facility] Med Surg 8B       Patient: Clay Chris  Unit/Bed: 8B-23/023-A  YOB: 1970  MRN: [id number]  Acct: [de-identified]   AdmittingDiagnosis: Pneumonia [J18.9]  Pneumonia of right lower lobe due to infectious organism [J18.9]  Mass of left lung [R91.8]  COVID-19 virus infection [U07.1]  Admit Date: 1/27/2022  Hospital Day: 2    Subjective:    Complains of mild pain at the thoracentesis site otherwise breathing well able to maintain pulse ox on room air  However noted significantly elevated D-dimer on the labs this morning    Objective:   BP (!) 163/88   Pulse 88   Temp 98.1 °F (36.7 °C) (Oral)   Resp 18   Ht 5' 7\" (1.702 m)   Wt 165 lb (74.8 kg)   SpO2 97%   BMI 25.84 kg/m²     Intake/Output Summary (Last 24 hours) at 1/29/2022 1221  Last data filed at 1/29/2022 0357  Gross per 24 hour   Intake 1745.52 ml   Output 950 ml   Net 795.52 ml     Physical Exam    Constitutional:   Appears comfortable but ill looking and cachectic     HENT: Head: Normocephalic, atraumatic, Bilateral external ears normal, Oropharynx mosit, No oral exudates, Nose normal.   Eyes: PERRL, EOMI, Conjunctiva normal, No discharge. No scleral icterus  Neck: Normal range of motion, No tenderness, Supple  Lympatics: No lymphadenopathy. Cardiovascular: Normal rate, regular rhythm, S1 normal and S2 normal.  Exam reveals no gallop.    Pulmonary/Chest: Effort is slightly increased, diminished lung sounds right lower lobe, mild respiratory distress with tachypnea.      Abdominal: Soft. Bowel sounds are normal.  exhibits no distension. There is no tenderness. There is no rebound and no guarding. Extremities: No edema, no tenderness, no cyanosis, no clubbing.    Musculoskeletal: Good range of motion in major joints is observed.  No major deformities noted. Neurological: Alert and oriented ×3, normal motor function, normal sensory function, no focal deficits.  GCS 15.   Skin: Skin is warm, dry and intact. No rash noted. No erythema. Psychiatric: Affect normal, judgment normal, mood normal.    DVT Prophylaxis:  Will increase Lovenox to 1mg/kg twice daily         Data:  CBC:   Lab Results   Component Value Date    WBC 17.3 01/28/2022    HGB 12.0 01/28/2022    HCT 37.8 01/28/2022    MCV 78.6 01/28/2022     01/28/2022     BMP:   Lab Results   Component Value Date     01/27/2022    K 4.2 01/27/2022     01/27/2022    CO2 24 01/27/2022    BUN 16 01/27/2022    CREATININE 0.9 01/27/2022    CALCIUM 9.2 01/27/2022     ABGs: No results found for: PH, PCO2, PO2, HCO3, O2SAT  Troponin: No results found for: TROPONINI   LFTs   Lab Results   Component Value Date    AST 22 01/27/2022    ALT 19 01/27/2022    BILITOT 0.6 01/27/2022    ALKPHOS 178 01/27/2022          Imaging   XR CHEST (2 VW)    Result Date: 1/27/2022  PROCEDURE: XR CHEST (2 VW) CLINICAL INFORMATION: 64-year-old male with sharp chest pain. COMPARISON: No prior study. TECHNIQUE: PA and lateral views of the chest were obtained. FINDINGS: There is extensive pneumonia in the right mid and lower lung zone. There is a large right pleural effusion. The cardiac silhouette and pulmonary vasculature are within normal limits. There is no pneumothorax. Visualized portions of the upper abdomen are within normal limits. The osseous structures are intact. No acute fractures or suspicious osseous lesions. 1. Extensive pneumonia in the right mid and lower lung zone. 2. Large right pleural effusion. **This report has been created using voice recognition software. It may contain minor errors which are inherent in voice recognition technology. ** Final report electronically signed by Dr Yuan on 1/27/2022 1:58 PM    CTA CHEST W WO CONTRAST    Result Date: 1/27/2022  PROCEDURE: CTA CHEST W WO CONTRAST CLINICAL INFORMATION: 64-year-old male with chest pain and shortness of breath. Elevated d-dimer. Right rib pain. COMPARISON: No prior study. TECHNIQUE: Axial 5 mm CT images were obtained through the abdomen and pelvis after the administration of intravenous contrast. Coronal and sagittal reconstructions were obtained. All CT scans at this facility use dose modulation, iterative reconstruction, and/or weight-based dosing when appropriate to reduce radiation dose to as low as reasonably achievable. FINDINGS: There is a right pleural effusion. There is probable infiltrate in the right lower lobe. . There is soft tissue swelling around the right lateral chest wall and tip of the scapula  The base of the heart is within appropriate limits. The liver is normal. The spleen is normal. The adrenals and pancreas are normal. The gallbladder is normal.    There is a 2.6 cm cyst in the right kidney. There is a 7.3 mm cyst in the left kidney. . No renal masses are noted. There is a small hiatal hernia No abnormalities of the small bowel loops are noted. The IVC and aorta are of normal caliber. There is no adenopathy. The urinary bladder is normal. There is pelvic free fluid. The colon is within normal limits. There is no adenopathy. No suspicious osseous lesions are identified. 1. Right pleural effusion. Probable infiltrate in the right lower lobe. 2. Soft tissue swelling overlying the right lateral chest wall and tip of scapula. 3. 2.6 cm right renal cyst. Small left renal cysts. 4. Small hiatal hernia. 5. There is free fluid within the pelvis. 6. Otherwise negative CT scan of the abdomen and pelvis. **This report has been created using voice recognition software. It may contain minor errors which are inherent in voice recognition technology. ** Final report electronically signed by DR Sondra Javier on 1/27/2022 3:39 PM    XR CHEST 1 VIEW    Result Date: 1/28/2022  PROCEDURE: XR CHEST 1 VIEW CLINICAL INFORMATION: post thora . COMPARISON: Chest radiographs dated 1/27/2022.  TECHNIQUE: AP upright view of the chest. FINDINGS: There is decreased opacity at the right lung base compared to prior exam. No pneumothorax is identified. The left lung appears clear. The cardiac silhouette is mildly prominent. The aorta is tortuous, stable compared to prior exam. Visualized osseous structures appear grossly intact. Decreased right pleural effusion without evidence of pneumothorax status post right thoracentesis. **This report has been created using voice recognition software. It may contain minor errors which are inherent in voice recognition technology. ** Final report electronically signed by Dr. Dhiraj Diallo MD on 1/28/2022 4:45 PM    US THORACENTESIS Which side should the procedure be performed? Right    Result Date: 1/28/2022  PROCEDURE: US THORACENTESIS CLINICAL INFORMATION: THoracentesis vs Biopsy of the lung mass . COMPARISON: CTA chest dated 1/27/2022. TECHNIQUE: Risks and benefits of the procedure were thoroughly explained and oral and written informed consent obtained. The patient was placed in a sitting position at the edge of the ultrasound gurney and a suitable puncture site designated at an appropriate right posterior intercostal space. A timeout procedure was performed and the patient was prepped and draped in the usual fashion. 2% lidocaine was infiltrated in the subcutis tissues at the designated puncture site. A one-step 5 [place] needle/catheter system was directed into the effusion with prompt return of red-tinged/orange fluid. 300 mL fluid was aspirated with 43 mL sent to laboratory for further analysis. The catheter was then removed and a bandage placed. The patient tolerated the procedure well and no immediate postprocedural complication was identified.  Physician performing procedure: Dr Timmy Wood Informed consent signed: Yes Local Anesthetic: 2 % Lidocaine Specimen volume: 43 ml Catheter: 19 ga 5 [place] Aspirated pleural fluid volume: 0.3 liters Aspirated pleural fluid color: Old heme tinged Complications: None observed FINDINGS: There are 4 saved ultrasound images. Preprocedure images show moderate right pleural effusion and post procedure images show small residual.      Successful ultrasound-guided right thoracentesis. **This report has been created using voice recognition software. It may contain minor errors which are inherent in voice recognition technology. ** Final report electronically signed by Dr. Karlos Zimmerman MD on 1/28/2022 5:11 PM      Assessment/Plan:    1.  Right lower lobe pneumonia  Minimally improved, will continue IV antibiotics,      2.  Right pleural effusion  S/p thoracentesis results pending     3.  Covid positive  Patient is not immunized, However significantly elevated Crp and D-Dimer, cont Decadron and increase Lovenox to 1 mg/kg twice daily  Currently patient is on room air hence we will hold baricitinib/remdesivir,     4.  Left upper lobe pulmonary mass  Referred to interventional radiology for possible biopsy consult oncology if necessary       4.  COPD with history of tobacco abuse  Encouraged to quit we will place nicotine patch and continue bronchodilator treatment along with IV steroids and antibiotics, oxygen supplementation as needed        Electronically signed by Myke Reina MD on 1/29/2022 at 12:21 PM    Rounding Hospitalist

## 2022-01-30 LAB
ANION GAP SERPL CALCULATED.3IONS-SCNC: 11 MEQ/L (ref 8–16)
BUN BLDV-MCNC: 20 MG/DL (ref 7–22)
C-REACTIVE PROTEIN: 5.58 MG/DL (ref 0–1)
CALCIUM SERPL-MCNC: 8.7 MG/DL (ref 8.5–10.5)
CHLORIDE BLD-SCNC: 101 MEQ/L (ref 98–111)
CO2: 25 MEQ/L (ref 23–33)
CREAT SERPL-MCNC: 1 MG/DL (ref 0.4–1.2)
GLUCOSE BLD-MCNC: 206 MG/DL (ref 70–108)
GLUCOSE BLD-MCNC: 268 MG/DL (ref 70–108)
GLUCOSE BLD-MCNC: 276 MG/DL (ref 70–108)
GLUCOSE BLD-MCNC: 302 MG/DL (ref 70–108)
GLUCOSE BLD-MCNC: 93 MG/DL (ref 70–108)
POTASSIUM SERPL-SCNC: 3.8 MEQ/L (ref 3.5–5.2)
SODIUM BLD-SCNC: 137 MEQ/L (ref 135–145)

## 2022-01-30 PROCEDURE — 1200000000 HC SEMI PRIVATE

## 2022-01-30 PROCEDURE — 6360000002 HC RX W HCPCS: Performed by: PHYSICIAN ASSISTANT

## 2022-01-30 PROCEDURE — 6370000000 HC RX 637 (ALT 250 FOR IP): Performed by: INTERNAL MEDICINE

## 2022-01-30 PROCEDURE — 86140 C-REACTIVE PROTEIN: CPT

## 2022-01-30 PROCEDURE — 36415 COLL VENOUS BLD VENIPUNCTURE: CPT

## 2022-01-30 PROCEDURE — 82948 REAGENT STRIP/BLOOD GLUCOSE: CPT

## 2022-01-30 PROCEDURE — 6360000002 HC RX W HCPCS: Performed by: INTERNAL MEDICINE

## 2022-01-30 PROCEDURE — 2580000003 HC RX 258: Performed by: INTERNAL MEDICINE

## 2022-01-30 PROCEDURE — 80048 BASIC METABOLIC PNL TOTAL CA: CPT

## 2022-01-30 PROCEDURE — 99233 SBSQ HOSP IP/OBS HIGH 50: CPT | Performed by: INTERNAL MEDICINE

## 2022-01-30 RX ORDER — INSULIN GLARGINE 100 [IU]/ML
5 INJECTION, SOLUTION SUBCUTANEOUS ONCE
Status: DISCONTINUED | OUTPATIENT
Start: 2022-01-30 | End: 2022-02-02 | Stop reason: HOSPADM

## 2022-01-30 RX ORDER — HYDRALAZINE HYDROCHLORIDE 20 MG/ML
10 INJECTION INTRAMUSCULAR; INTRAVENOUS EVERY 6 HOURS PRN
Status: DISCONTINUED | OUTPATIENT
Start: 2022-01-30 | End: 2022-01-30

## 2022-01-30 RX ORDER — HYDRALAZINE HYDROCHLORIDE 20 MG/ML
20 INJECTION INTRAMUSCULAR; INTRAVENOUS EVERY 6 HOURS PRN
Status: DISCONTINUED | OUTPATIENT
Start: 2022-01-30 | End: 2022-02-02 | Stop reason: HOSPADM

## 2022-01-30 RX ORDER — LISINOPRIL 40 MG/1
40 TABLET ORAL DAILY
Status: DISCONTINUED | OUTPATIENT
Start: 2022-01-31 | End: 2022-02-02 | Stop reason: HOSPADM

## 2022-01-30 RX ORDER — INSULIN GLARGINE 100 [IU]/ML
15 INJECTION, SOLUTION SUBCUTANEOUS 2 TIMES DAILY
Status: DISCONTINUED | OUTPATIENT
Start: 2022-01-30 | End: 2022-02-02 | Stop reason: HOSPADM

## 2022-01-30 RX ORDER — LISINOPRIL 10 MG/1
10 TABLET ORAL ONCE
Status: COMPLETED | OUTPATIENT
Start: 2022-01-30 | End: 2022-01-30

## 2022-01-30 RX ADMIN — ACETAMINOPHEN 650 MG: 325 TABLET ORAL at 20:23

## 2022-01-30 RX ADMIN — INSULIN GLARGINE 10 UNITS: 100 INJECTION, SOLUTION SUBCUTANEOUS at 11:30

## 2022-01-30 RX ADMIN — TRAMADOL HYDROCHLORIDE 50 MG: 50 TABLET, COATED ORAL at 04:07

## 2022-01-30 RX ADMIN — INSULIN LISPRO 2 UNITS: 100 INJECTION, SOLUTION INTRAVENOUS; SUBCUTANEOUS at 22:00

## 2022-01-30 RX ADMIN — LISINOPRIL 10 MG: 20 TABLET ORAL at 13:32

## 2022-01-30 RX ADMIN — INSULIN GLARGINE 15 UNITS: 100 INJECTION, SOLUTION SUBCUTANEOUS at 22:00

## 2022-01-30 RX ADMIN — ENOXAPARIN SODIUM 70 MG: 100 INJECTION SUBCUTANEOUS at 20:23

## 2022-01-30 RX ADMIN — INSULIN LISPRO 3 UNITS: 100 INJECTION, SOLUTION INTRAVENOUS; SUBCUTANEOUS at 17:30

## 2022-01-30 RX ADMIN — INSULIN LISPRO 2 UNITS: 100 INJECTION, SOLUTION INTRAVENOUS; SUBCUTANEOUS at 08:00

## 2022-01-30 RX ADMIN — GUAIFENESIN 600 MG: 600 TABLET, EXTENDED RELEASE ORAL at 08:45

## 2022-01-30 RX ADMIN — ENOXAPARIN SODIUM 70 MG: 100 INJECTION SUBCUTANEOUS at 08:44

## 2022-01-30 RX ADMIN — TRAMADOL HYDROCHLORIDE 50 MG: 50 TABLET, COATED ORAL at 13:32

## 2022-01-30 RX ADMIN — HYDRALAZINE HYDROCHLORIDE 20 MG: 20 INJECTION INTRAMUSCULAR; INTRAVENOUS at 20:22

## 2022-01-30 RX ADMIN — LEVOFLOXACIN 750 MG: 5 INJECTION, SOLUTION INTRAVENOUS at 17:30

## 2022-01-30 RX ADMIN — LISINOPRIL 30 MG: 20 TABLET ORAL at 08:45

## 2022-01-30 RX ADMIN — ATORVASTATIN CALCIUM 10 MG: 10 TABLET, FILM COATED ORAL at 20:23

## 2022-01-30 RX ADMIN — HYDRALAZINE HYDROCHLORIDE 10 MG: 20 INJECTION, SOLUTION INTRAMUSCULAR; INTRAVENOUS at 04:59

## 2022-01-30 RX ADMIN — DEXAMETHASONE SODIUM PHOSPHATE 10 MG: 4 INJECTION, SOLUTION INTRAMUSCULAR; INTRAVENOUS at 08:49

## 2022-01-30 RX ADMIN — SODIUM CHLORIDE: 9 INJECTION, SOLUTION INTRAVENOUS at 08:36

## 2022-01-30 RX ADMIN — SODIUM CHLORIDE, PRESERVATIVE FREE 10 ML: 5 INJECTION INTRAVENOUS at 22:00

## 2022-01-30 RX ADMIN — HYDROCHLOROTHIAZIDE 25 MG: 25 TABLET ORAL at 08:46

## 2022-01-30 ASSESSMENT — PAIN DESCRIPTION - ORIENTATION: ORIENTATION: RIGHT

## 2022-01-30 ASSESSMENT — PAIN SCALES - GENERAL
PAINLEVEL_OUTOF10: 3
PAINLEVEL_OUTOF10: 4
PAINLEVEL_OUTOF10: 8
PAINLEVEL_OUTOF10: 0
PAINLEVEL_OUTOF10: 0
PAINLEVEL_OUTOF10: 4

## 2022-01-30 ASSESSMENT — PAIN DESCRIPTION - ONSET: ONSET: GRADUAL

## 2022-01-30 ASSESSMENT — PAIN DESCRIPTION - FREQUENCY: FREQUENCY: INTERMITTENT

## 2022-01-30 ASSESSMENT — PAIN DESCRIPTION - PAIN TYPE: TYPE: ACUTE PAIN

## 2022-01-30 ASSESSMENT — PAIN DESCRIPTION - DESCRIPTORS: DESCRIPTORS: ACHING

## 2022-01-30 ASSESSMENT — PAIN DESCRIPTION - LOCATION: LOCATION: CHEST

## 2022-01-30 ASSESSMENT — PAIN DESCRIPTION - PROGRESSION: CLINICAL_PROGRESSION: GRADUALLY WORSENING

## 2022-01-30 NOTE — PROGRESS NOTES
Hospitalist Progress Note  [facility] Med Surg 8B       Patient: Zeferino Valentino  Unit/Bed: 8B-23/023-A  YOB: 1970  MRN: [id number]  Acct: [de-identified]   AdmittingDiagnosis: Pneumonia [J18.9]  Pneumonia of right lower lobe due to infectious organism [J18.9]  Mass of left lung [R91.8]  COVID-19 virus infection [U07.1]  Admit Date: 1/27/2022  Hospital Day: 3    Subjective:    Reports feeling slightly better, continues to be on room air, blood sugars are well controlled    Objective:   BP (!) 145/84   Pulse 86   Temp 98.3 °F (36.8 °C) (Oral)   Resp 20   Ht 5' 7\" (1.702 m)   Wt 165 lb (74.8 kg)   SpO2 97%   BMI 25.84 kg/m²     Intake/Output Summary (Last 24 hours) at 1/30/2022 1456  Last data filed at 1/30/2022 0826  Gross per 24 hour   Intake 700 ml   Output 1675 ml   Net -975 ml     Physical Exam    Constitutional:   Appears comfortable but ill looking and cachectic     HENT: Head: Normocephalic, atraumatic, Bilateral external ears normal, Oropharynx mosit, No oral exudates, Nose normal.   Eyes: PERRL, EOMI, Conjunctiva normal, No discharge. No scleral icterus  Neck: Normal range of motion, No tenderness, Supple  Lympatics: No lymphadenopathy. Cardiovascular: Normal rate, regular rhythm, S1 normal and S2 normal.  Exam reveals no gallop.    Pulmonary/Chest: Effort is slightly increased, diminished lung sounds right lower lobe, mild respiratory distress with tachypnea.      Abdominal: Soft. Bowel sounds are normal.  exhibits no distension. There is no tenderness. There is no rebound and no guarding. Extremities: No edema, no tenderness, no cyanosis, no clubbing.    Musculoskeletal: Good range of motion in major joints is observed.  No major deformities noted. Neurological: Alert and oriented ×3, normal motor function, normal sensory function, no focal deficits.  GCS 15. Skin: Skin is warm, dry and intact. No rash noted. No erythema.    Psychiatric: Affect normal, judgment normal, mood normal.     DVT Prophylaxis:   Lovenox     Data:  CBC:   Lab Results   Component Value Date    WBC 17.3 01/28/2022    HGB 12.0 01/28/2022    HCT 37.8 01/28/2022    MCV 78.6 01/28/2022     01/28/2022     BMP:   Lab Results   Component Value Date     01/30/2022    K 3.8 01/30/2022    K 4.2 01/27/2022     01/30/2022    CO2 25 01/30/2022    BUN 20 01/30/2022    CREATININE 1.0 01/30/2022    CALCIUM 8.7 01/30/2022     ABGs: No results found for: PH, PCO2, PO2, HCO3, O2SAT  Troponin: No results found for: TROPONINI   LFTs   Lab Results   Component Value Date    AST 22 01/27/2022    ALT 19 01/27/2022    BILITOT 0.6 01/27/2022    ALKPHOS 178 01/27/2022          Imaging   XR CHEST (2 VW)    Result Date: 1/27/2022  PROCEDURE: XR CHEST (2 VW) CLINICAL INFORMATION: 64-year-old male with sharp chest pain. COMPARISON: No prior study. TECHNIQUE: PA and lateral views of the chest were obtained. FINDINGS: There is extensive pneumonia in the right mid and lower lung zone. There is a large right pleural effusion. The cardiac silhouette and pulmonary vasculature are within normal limits. There is no pneumothorax. Visualized portions of the upper abdomen are within normal limits. The osseous structures are intact. No acute fractures or suspicious osseous lesions. 1. Extensive pneumonia in the right mid and lower lung zone. 2. Large right pleural effusion. **This report has been created using voice recognition software. It may contain minor errors which are inherent in voice recognition technology. ** Final report electronically signed by Dr Yuan on 1/27/2022 1:58 PM    CTA CHEST W WO CONTRAST    Result Date: 1/27/2022  PROCEDURE: CTA CHEST W WO CONTRAST CLINICAL INFORMATION: 64-year-old male with chest pain and shortness of breath. Elevated d-dimer. Right rib pain. COMPARISON: No prior study.  TECHNIQUE: 3 mm axial images were obtained through the chest after the administration of IV contrast.  A non-contrast localizer was obtained. 3D reconstructions were performed on the scanner to include MIP images through the right and left pulmonary arteries and sagittal images through the chest. Isovue was the intravenous contrast utilized. All CT scans at this facility use dose modulation, iterative reconstruction, and/or weight-based dosing when appropriate to reduce radiation dose to as low as reasonably achievable. FINDINGS: The thyroid gland is present. The central airways are patent. There is cardiomegaly. There is adequate opacification of the pulmonary arteries. There are no pulmonary emboli. There are emphysematous changes in the lungs, predominantly in the apices. There is a mass in the left upper lobe which measures 3.2 x 1.5 cm. There is a somewhat loculated appearing pleural effusion on the right side. There are adjacent areas of consolidation which may be a combination of pneumonia and atelectasis. There is no pneumothorax. There is a lipoma overlying the right side of the chest which measures 2.3 x 0.7 cm. There are no suspicious findings in the upper abdominal fields. There is a cyst of the right kidney. The bones are intact. 1. There is a large somewhat loculated appearing pleural effusion on the right side. There are adjacent areas of consolidation which may represent a combination of pneumonia and atelectasis. 2. There is a left upper lobe pulmonary mass. 3. Emphysematous changes. **This report has been created using voice recognition software. It may contain minor errors which are inherent in voice recognition technology. ** Final report electronically signed by Dr Mildred Alicea on 1/27/2022 3:38 PM    CT ABDOMEN PELVIS W IV CONTRAST Additional Contrast? None    Result Date: 1/27/2022  PROCEDURE: CT ABDOMEN PELVIS W IV CONTRAST CLINICAL INFORMATION: abd pain, Trauma . COMPARISON: None.  TECHNIQUE: Axial 5 mm CT images were obtained through the abdomen and pelvis after the administration of intravenous contrast. Coronal and sagittal reconstructions were obtained. All CT scans at this facility use dose modulation, iterative reconstruction, and/or weight-based dosing when appropriate to reduce radiation dose to as low as reasonably achievable. FINDINGS: There is a right pleural effusion. There is probable infiltrate in the right lower lobe. . There is soft tissue swelling around the right lateral chest wall and tip of the scapula  The base of the heart is within appropriate limits. The liver is normal. The spleen is normal. The adrenals and pancreas are normal. The gallbladder is normal.    There is a 2.6 cm cyst in the right kidney. There is a 7.3 mm cyst in the left kidney. . No renal masses are noted. There is a small hiatal hernia No abnormalities of the small bowel loops are noted. The IVC and aorta are of normal caliber. There is no adenopathy. The urinary bladder is normal. There is pelvic free fluid. The colon is within normal limits. There is no adenopathy. No suspicious osseous lesions are identified. 1. Right pleural effusion. Probable infiltrate in the right lower lobe. 2. Soft tissue swelling overlying the right lateral chest wall and tip of scapula. 3. 2.6 cm right renal cyst. Small left renal cysts. 4. Small hiatal hernia. 5. There is free fluid within the pelvis. 6. Otherwise negative CT scan of the abdomen and pelvis. **This report has been created using voice recognition software. It may contain minor errors which are inherent in voice recognition technology. ** Final report electronically signed by DR Margie Walters on 1/27/2022 3:39 PM    XR CHEST 1 VIEW    Result Date: 1/28/2022  PROCEDURE: XR CHEST 1 VIEW CLINICAL INFORMATION: post thora . COMPARISON: Chest radiographs dated 1/27/2022. TECHNIQUE: AP upright view of the chest. FINDINGS: There is decreased opacity at the right lung base compared to prior exam. No pneumothorax is identified. The left lung appears clear.  The cardiac silhouette is mildly prominent. The aorta is tortuous, stable compared to prior exam. Visualized osseous structures appear grossly intact. Decreased right pleural effusion without evidence of pneumothorax status post right thoracentesis. **This report has been created using voice recognition software. It may contain minor errors which are inherent in voice recognition technology. ** Final report electronically signed by Dr. Elizabeth Garner MD on 1/28/2022 4:45 PM    US THORACENTESIS Which side should the procedure be performed? Right    Result Date: 1/28/2022  PROCEDURE: US THORACENTESIS CLINICAL INFORMATION: THoracentesis vs Biopsy of the lung mass . COMPARISON: CTA chest dated 1/27/2022. TECHNIQUE: Risks and benefits of the procedure were thoroughly explained and oral and written informed consent obtained. The patient was placed in a sitting position at the edge of the ultrasound gurney and a suitable puncture site designated at an appropriate right posterior intercostal space. A timeout procedure was performed and the patient was prepped and draped in the usual fashion. 2% lidocaine was infiltrated in the subcutis tissues at the designated puncture site. A one-step 5 [place] needle/catheter system was directed into the effusion with prompt return of red-tinged/orange fluid. 300 mL fluid was aspirated with 43 mL sent to laboratory for further analysis. The catheter was then removed and a bandage placed. The patient tolerated the procedure well and no immediate postprocedural complication was identified. Physician performing procedure: Dr Bufford Oppenheim Informed consent signed: Yes Local Anesthetic: 2 % Lidocaine Specimen volume: 43 ml Catheter: 19 ga 5 [place]ese Aspirated pleural fluid volume: 0.3 liters Aspirated pleural fluid color: Old heme tinged Complications: None observed FINDINGS: There are 4 saved ultrasound images.  Preprocedure images show moderate right pleural effusion and post procedure images show small residual.      Successful ultrasound-guided right thoracentesis. **This report has been created using voice recognition software. It may contain minor errors which are inherent in voice recognition technology. ** Final report electronically signed by Dr. Chayito Liang MD on 1/28/2022 5:11 PM      Assessment/Plan:    1.  Right lower lobe pneumonia  continue IV antibiotics, improving monitor CBC     2.  Right pleural effusion  S/p thoracentesis, improved     3.  Covid positive  cont Decadron and  Lovenox   As needed oxygen     4.  Left upper lobe pulmonary mass  Referred to interventional radiology for possible biopsy consult oncology as outpatient    5. COPD with history of tobacco abuse  Encouraged to quit we will place nicotine patch and continue bronchodilator treatment along with IV steroids and antibiotics, oxygen supplementation as needed    6. Type 2 diabetes  Continue Lantus twice daily and regular insulin to scale per protocol    7.   Hypertension  Continue current management      Electronically signed by Pepito Horta MD on 1/30/2022 at 2:56 PM    Rounding Hospitalist

## 2022-01-31 LAB
ANION GAP SERPL CALCULATED.3IONS-SCNC: 11 MEQ/L (ref 8–16)
BUN BLDV-MCNC: 20 MG/DL (ref 7–22)
C-REACTIVE PROTEIN: 3.95 MG/DL (ref 0–1)
CALCIUM SERPL-MCNC: 9.3 MG/DL (ref 8.5–10.5)
CHLORIDE BLD-SCNC: 102 MEQ/L (ref 98–111)
CO2: 24 MEQ/L (ref 23–33)
CREAT SERPL-MCNC: 1 MG/DL (ref 0.4–1.2)
D-DIMER QUANTITATIVE: 2264 NG/ML FEU (ref 0–500)
ERYTHROCYTE [DISTWIDTH] IN BLOOD BY AUTOMATED COUNT: 15.2 % (ref 11.5–14.5)
ERYTHROCYTE [DISTWIDTH] IN BLOOD BY AUTOMATED COUNT: 42 FL (ref 35–45)
GLUCOSE BLD-MCNC: 142 MG/DL (ref 70–108)
GLUCOSE BLD-MCNC: 197 MG/DL (ref 70–108)
GLUCOSE BLD-MCNC: 295 MG/DL (ref 70–108)
GLUCOSE BLD-MCNC: 328 MG/DL (ref 70–108)
GLUCOSE BLD-MCNC: 590 MG/DL (ref 70–108)
GLUCOSE BLD-MCNC: 61 MG/DL (ref 70–108)
HCT VFR BLD CALC: 39.4 % (ref 42–52)
HEMOGLOBIN: 12.4 GM/DL (ref 14–18)
MCH RBC QN AUTO: 24.4 PG (ref 26–33)
MCHC RBC AUTO-ENTMCNC: 31.5 GM/DL (ref 32.2–35.5)
MCV RBC AUTO: 77.4 FL (ref 80–94)
PLATELET # BLD: 757 THOU/MM3 (ref 130–400)
PMV BLD AUTO: 9.1 FL (ref 9.4–12.4)
POTASSIUM SERPL-SCNC: 4.5 MEQ/L (ref 3.5–5.2)
RBC # BLD: 5.09 MILL/MM3 (ref 4.7–6.1)
SODIUM BLD-SCNC: 137 MEQ/L (ref 135–145)
WBC # BLD: 13.9 THOU/MM3 (ref 4.8–10.8)

## 2022-01-31 PROCEDURE — 2580000003 HC RX 258: Performed by: INTERNAL MEDICINE

## 2022-01-31 PROCEDURE — 6360000002 HC RX W HCPCS: Performed by: INTERNAL MEDICINE

## 2022-01-31 PROCEDURE — 99233 SBSQ HOSP IP/OBS HIGH 50: CPT | Performed by: INTERNAL MEDICINE

## 2022-01-31 PROCEDURE — 82948 REAGENT STRIP/BLOOD GLUCOSE: CPT

## 2022-01-31 PROCEDURE — 36415 COLL VENOUS BLD VENIPUNCTURE: CPT

## 2022-01-31 PROCEDURE — 85379 FIBRIN DEGRADATION QUANT: CPT

## 2022-01-31 PROCEDURE — 85027 COMPLETE CBC AUTOMATED: CPT

## 2022-01-31 PROCEDURE — 86140 C-REACTIVE PROTEIN: CPT

## 2022-01-31 PROCEDURE — 80048 BASIC METABOLIC PNL TOTAL CA: CPT

## 2022-01-31 PROCEDURE — 6370000000 HC RX 637 (ALT 250 FOR IP): Performed by: INTERNAL MEDICINE

## 2022-01-31 PROCEDURE — 1200000000 HC SEMI PRIVATE

## 2022-01-31 RX ORDER — AMLODIPINE BESYLATE 10 MG/1
10 TABLET ORAL DAILY
Status: DISCONTINUED | OUTPATIENT
Start: 2022-01-31 | End: 2022-02-02 | Stop reason: HOSPADM

## 2022-01-31 RX ADMIN — TRAMADOL HYDROCHLORIDE 50 MG: 50 TABLET, COATED ORAL at 00:59

## 2022-01-31 RX ADMIN — LISINOPRIL 40 MG: 40 TABLET ORAL at 08:07

## 2022-01-31 RX ADMIN — INSULIN LISPRO 3 UNITS: 100 INJECTION, SOLUTION INTRAVENOUS; SUBCUTANEOUS at 21:40

## 2022-01-31 RX ADMIN — INSULIN LISPRO 3 UNITS: 100 INJECTION, SOLUTION INTRAVENOUS; SUBCUTANEOUS at 18:05

## 2022-01-31 RX ADMIN — ENOXAPARIN SODIUM 70 MG: 100 INJECTION SUBCUTANEOUS at 08:07

## 2022-01-31 RX ADMIN — HYDROCHLOROTHIAZIDE 25 MG: 25 TABLET ORAL at 08:07

## 2022-01-31 RX ADMIN — ACETAMINOPHEN 650 MG: 325 TABLET ORAL at 08:12

## 2022-01-31 RX ADMIN — GUAIFENESIN 600 MG: 600 TABLET, EXTENDED RELEASE ORAL at 08:12

## 2022-01-31 RX ADMIN — INSULIN GLARGINE 15 UNITS: 100 INJECTION, SOLUTION SUBCUTANEOUS at 21:40

## 2022-01-31 RX ADMIN — INSULIN LISPRO 1 UNITS: 100 INJECTION, SOLUTION INTRAVENOUS; SUBCUTANEOUS at 12:54

## 2022-01-31 RX ADMIN — ACETAMINOPHEN 650 MG: 325 TABLET ORAL at 17:50

## 2022-01-31 RX ADMIN — LEVOFLOXACIN 750 MG: 5 INJECTION, SOLUTION INTRAVENOUS at 17:49

## 2022-01-31 RX ADMIN — DEXAMETHASONE SODIUM PHOSPHATE 10 MG: 4 INJECTION, SOLUTION INTRAMUSCULAR; INTRAVENOUS at 08:07

## 2022-01-31 RX ADMIN — SODIUM CHLORIDE, PRESERVATIVE FREE 10 ML: 5 INJECTION INTRAVENOUS at 08:07

## 2022-01-31 RX ADMIN — HYDRALAZINE HYDROCHLORIDE 20 MG: 20 INJECTION INTRAMUSCULAR; INTRAVENOUS at 23:58

## 2022-01-31 RX ADMIN — AMLODIPINE BESYLATE 10 MG: 10 TABLET ORAL at 12:04

## 2022-01-31 RX ADMIN — ATORVASTATIN CALCIUM 10 MG: 10 TABLET, FILM COATED ORAL at 23:57

## 2022-01-31 RX ADMIN — ENOXAPARIN SODIUM 70 MG: 100 INJECTION SUBCUTANEOUS at 21:55

## 2022-01-31 RX ADMIN — TRAMADOL HYDROCHLORIDE 50 MG: 50 TABLET, COATED ORAL at 21:53

## 2022-01-31 ASSESSMENT — PAIN DESCRIPTION - PAIN TYPE
TYPE: ACUTE PAIN
TYPE: ACUTE PAIN

## 2022-01-31 ASSESSMENT — PAIN DESCRIPTION - PROGRESSION
CLINICAL_PROGRESSION: NOT CHANGED
CLINICAL_PROGRESSION: NOT CHANGED

## 2022-01-31 ASSESSMENT — PAIN SCALES - GENERAL
PAINLEVEL_OUTOF10: 6

## 2022-01-31 ASSESSMENT — PAIN DESCRIPTION - ORIENTATION
ORIENTATION: RIGHT
ORIENTATION: RIGHT

## 2022-01-31 ASSESSMENT — PAIN DESCRIPTION - ONSET
ONSET: ON-GOING
ONSET: ON-GOING

## 2022-01-31 ASSESSMENT — PAIN DESCRIPTION - FREQUENCY
FREQUENCY: CONTINUOUS
FREQUENCY: CONTINUOUS

## 2022-01-31 ASSESSMENT — PAIN DESCRIPTION - LOCATION
LOCATION: CHEST
LOCATION: CHEST

## 2022-01-31 ASSESSMENT — PAIN DESCRIPTION - DESCRIPTORS
DESCRIPTORS: ACHING
DESCRIPTORS: ACHING

## 2022-01-31 NOTE — CARE COORDINATION
Discharge Planning Update:     Hospital day: 4    Following for Covid pneumonia, pleural effusion. Pt is on room air. Sat is 97%. Afebrile. This CM made pt an appt with a new PCP [facility]) that is closer to his residence as sometimes pt has to walk. This was per pt request. Cont to follow for needs.

## 2022-01-31 NOTE — PROGRESS NOTES
Hospitalist Progress Note  [facility] Med Surg 8B       Patient: Nano Meyer  Unit/Bed: 8B-23/023-A  YOB: 1970  MRN: [id number]  Acct: [de-identified]   AdmittingDiagnosis: Pneumonia [J18.9]  Pneumonia of right lower lobe due to infectious organism [J18.9]  Mass of left lung [R91.8]  COVID-19 virus infection [U07.1]  Admit Date: 1/27/2022  Hospital Day: 4    Subjective:    Feels a little bit better continues to be on room air occasional cough no chest pains    Objective:   BP (!) 169/96   Pulse 100   Temp 98.4 °F (36.9 °C) (Oral)   Resp 18   Ht 5' 7\" (1.702 m)   Wt 165 lb (74.8 kg)   SpO2 97%   BMI 25.84 kg/m²     Intake/Output Summary (Last 24 hours) at 1/31/2022 1627  Last data filed at 1/31/2022 0801  Gross per 24 hour   Intake --   Output 500 ml   Net -500 ml     Physical Exam    Constitutional:   Appears comfortable but ill looking and cachectic     HENT: Head: Normocephalic, atraumatic, Bilateral external ears normal, Oropharynx mosit, No oral exudates, Nose normal.   Eyes: PERRL, EOMI, Conjunctiva normal, No discharge. No scleral icterus  Neck: Normal range of motion, No tenderness, Supple  Lympatics: No lymphadenopathy. Cardiovascular: Normal rate, regular rhythm, S1 normal and S2 normal.  Exam reveals no gallop.    Pulmonary/Chest: Effort is slightly increased, diminished lung sounds right lower lobe, mild respiratory distress with tachypnea.      Abdominal: Soft. Bowel sounds are normal.  exhibits no distension. There is no tenderness. There is no rebound and no guarding. Extremities: No edema, no tenderness, no cyanosis, no clubbing.    Musculoskeletal: Good range of motion in major joints is observed.  No major deformities noted. Neurological: Alert and oriented ×3, normal motor function, normal sensory function, no focal deficits.  GCS 15. Skin: Skin is warm, dry and intact. No rash noted. No erythema.    Psychiatric: Affect normal, judgment normal, mood normal.     DVT Prophylaxis:   Lovenox     Data:  CBC:   Lab Results   Component Value Date    WBC 13.9 01/31/2022    HGB 12.4 01/31/2022    HCT 39.4 01/31/2022    MCV 77.4 01/31/2022     01/31/2022     BMP:   Lab Results   Component Value Date     01/31/2022    K 4.5 01/31/2022    K 4.2 01/27/2022     01/31/2022    CO2 24 01/31/2022    BUN 20 01/31/2022    CREATININE 1.0 01/31/2022    CALCIUM 9.3 01/31/2022     ABGs: No results found for: PH, PCO2, PO2, HCO3, O2SAT  Troponin: No results found for: TROPONINI   LFTs   Lab Results   Component Value Date    AST 22 01/27/2022    ALT 19 01/27/2022    BILITOT 0.6 01/27/2022    ALKPHOS 178 01/27/2022          Imaging   XR CHEST (2 VW)    Result Date: 1/27/2022  PROCEDURE: XR CHEST (2 VW) CLINICAL INFORMATION: 71-year-old male with sharp chest pain. COMPARISON: No prior study. TECHNIQUE: PA and lateral views of the chest were obtained. FINDINGS: There is extensive pneumonia in the right mid and lower lung zone. There is a large right pleural effusion. The cardiac silhouette and pulmonary vasculature are within normal limits. There is no pneumothorax. Visualized portions of the upper abdomen are within normal limits. The osseous structures are intact. No acute fractures or suspicious osseous lesions. 1. Extensive pneumonia in the right mid and lower lung zone. 2. Large right pleural effusion. **This report has been created using voice recognition software. It may contain minor errors which are inherent in voice recognition technology. ** Final report electronically signed by Dr Christiano Canales on 1/27/2022 1:58 PM    CTA CHEST W WO CONTRAST    Result Date: 1/27/2022  PROCEDURE: CTA CHEST W WO CONTRAST CLINICAL INFORMATION: 71-year-old male with chest pain and shortness of breath. Elevated d-dimer. Right rib pain. COMPARISON: No prior study.  TECHNIQUE: 3 mm axial images were obtained through the chest after the administration of IV contrast.  A non-contrast localizer was obtained. 3D reconstructions were performed on the scanner to include MIP images through the right and left pulmonary arteries and sagittal images through the chest. Isovue was the intravenous contrast utilized. All CT scans at this facility use dose modulation, iterative reconstruction, and/or weight-based dosing when appropriate to reduce radiation dose to as low as reasonably achievable. FINDINGS: The thyroid gland is present. The central airways are patent. There is cardiomegaly. There is adequate opacification of the pulmonary arteries. There are no pulmonary emboli. There are emphysematous changes in the lungs, predominantly in the apices. There is a mass in the left upper lobe which measures 3.2 x 1.5 cm. There is a somewhat loculated appearing pleural effusion on the right side. There are adjacent areas of consolidation which may be a combination of pneumonia and atelectasis. There is no pneumothorax. There is a lipoma overlying the right side of the chest which measures 2.3 x 0.7 cm. There are no suspicious findings in the upper abdominal fields. There is a cyst of the right kidney. The bones are intact. 1. There is a large somewhat loculated appearing pleural effusion on the right side. There are adjacent areas of consolidation which may represent a combination of pneumonia and atelectasis. 2. There is a left upper lobe pulmonary mass. 3. Emphysematous changes. **This report has been created using voice recognition software. It may contain minor errors which are inherent in voice recognition technology. ** Final report electronically signed by Dr Mildred Alicea on 1/27/2022 3:38 PM    CT ABDOMEN PELVIS W IV CONTRAST Additional Contrast? None    Result Date: 1/27/2022  PROCEDURE: CT ABDOMEN PELVIS W IV CONTRAST CLINICAL INFORMATION: abd pain, Trauma . COMPARISON: None.  TECHNIQUE: Axial 5 mm CT images were obtained through the abdomen and pelvis after the administration of intravenous contrast. Coronal and sagittal reconstructions were obtained. All CT scans at this facility use dose modulation, iterative reconstruction, and/or weight-based dosing when appropriate to reduce radiation dose to as low as reasonably achievable. FINDINGS: There is a right pleural effusion. There is probable infiltrate in the right lower lobe. . There is soft tissue swelling around the right lateral chest wall and tip of the scapula  The base of the heart is within appropriate limits. The liver is normal. The spleen is normal. The adrenals and pancreas are normal. The gallbladder is normal.    There is a 2.6 cm cyst in the right kidney. There is a 7.3 mm cyst in the left kidney. . No renal masses are noted. There is a small hiatal hernia No abnormalities of the small bowel loops are noted. The IVC and aorta are of normal caliber. There is no adenopathy. The urinary bladder is normal. There is pelvic free fluid. The colon is within normal limits. There is no adenopathy. No suspicious osseous lesions are identified. 1. Right pleural effusion. Probable infiltrate in the right lower lobe. 2. Soft tissue swelling overlying the right lateral chest wall and tip of scapula. 3. 2.6 cm right renal cyst. Small left renal cysts. 4. Small hiatal hernia. 5. There is free fluid within the pelvis. 6. Otherwise negative CT scan of the abdomen and pelvis. **This report has been created using voice recognition software. It may contain minor errors which are inherent in voice recognition technology. ** Final report electronically signed by DR Jody Rubio on 1/27/2022 3:39 PM    XR CHEST 1 VIEW    Result Date: 1/28/2022  PROCEDURE: XR CHEST 1 VIEW CLINICAL INFORMATION: post thora . COMPARISON: Chest radiographs dated 1/27/2022. TECHNIQUE: AP upright view of the chest. FINDINGS: There is decreased opacity at the right lung base compared to prior exam. No pneumothorax is identified. The left lung appears clear.  The cardiac silhouette is mildly prominent. The aorta is tortuous, stable compared to prior exam. Visualized osseous structures appear grossly intact. Decreased right pleural effusion without evidence of pneumothorax status post right thoracentesis. **This report has been created using voice recognition software. It may contain minor errors which are inherent in voice recognition technology. ** Final report electronically signed by Dr. Karlos Zimmerman MD on 1/28/2022 4:45 PM    US THORACENTESIS Which side should the procedure be performed? Right    Result Date: 1/28/2022  PROCEDURE: US THORACENTESIS CLINICAL INFORMATION: THoracentesis vs Biopsy of the lung mass . COMPARISON: CTA chest dated 1/27/2022. TECHNIQUE: Risks and benefits of the procedure were thoroughly explained and oral and written informed consent obtained. The patient was placed in a sitting position at the edge of the ultrasound gurney and a suitable puncture site designated at an appropriate right posterior intercostal space. A timeout procedure was performed and the patient was prepped and draped in the usual fashion. 2% lidocaine was infiltrated in the subcutis tissues at the designated puncture site. A one-step 5 [place] needle/catheter system was directed into the effusion with prompt return of red-tinged/orange fluid. 300 mL fluid was aspirated with 43 mL sent to laboratory for further analysis. The catheter was then removed and a bandage placed. The patient tolerated the procedure well and no immediate postprocedural complication was identified. Physician performing procedure: Dr Michaelle Estevez Informed consent signed: Yes Local Anesthetic: 2 % Lidocaine Specimen volume: 43 ml Catheter: 19 ga 5 [place] Aspirated pleural fluid volume: 0.3 liters Aspirated pleural fluid color: Old heme tinged Complications: None observed FINDINGS: There are 4 saved ultrasound images.  Preprocedure images show moderate right pleural effusion and post procedure images show small residual. Successful ultrasound-guided right thoracentesis. **This report has been created using voice recognition software. It may contain minor errors which are inherent in voice recognition technology. ** Final report electronically signed by Dr. Lin Fernandez MD on 1/28/2022 5:11 PM      Assessment/Plan:    1.  Right lower lobe pneumonia  continue IV antibiotics, improving monitor CBC. Possible D/C in AM   Will rpt CXR 2 views      2.  Right pleural effusion  S/p thoracentesis, improved, culture negative, Cytology pending      3.  Covid positive  cont Decadron and  Lovenox and PRN oxygen     4.  Left upper lobe pulmonary mass  Referred to interventional radiology for possible biopsy consult oncology as outpatient     5. COPD with history of tobacco abuse  Encouraged to quit we will place nicotine patch and continue bronchodilator treatment along with IV steroids and antibiotics, oxygen supplementation as needed     6. Type 2 diabetes  Continue Lantus twice daily and regular insulin to scale per protocol     7.   Hypertension  Continue current management        Electronically signed by Vicente Puente MD on 1/31/2022 at 4:27 PM    Rounding Hospitalist

## 2022-02-01 ENCOUNTER — APPOINTMENT (OUTPATIENT)
Dept: GENERAL RADIOLOGY | Age: 52
DRG: 137 | End: 2022-02-01
Payer: MEDICARE

## 2022-02-01 LAB
ANION GAP SERPL CALCULATED.3IONS-SCNC: 13 MEQ/L (ref 8–16)
BUN BLDV-MCNC: 22 MG/DL (ref 7–22)
CALCIUM SERPL-MCNC: 9.5 MG/DL (ref 8.5–10.5)
CHLORIDE BLD-SCNC: 101 MEQ/L (ref 98–111)
CO2: 26 MEQ/L (ref 23–33)
CREAT SERPL-MCNC: 1.1 MG/DL (ref 0.4–1.2)
ERYTHROCYTE [DISTWIDTH] IN BLOOD BY AUTOMATED COUNT: 15.3 % (ref 11.5–14.5)
ERYTHROCYTE [DISTWIDTH] IN BLOOD BY AUTOMATED COUNT: 41.9 FL (ref 35–45)
GLUCOSE BLD-MCNC: 132 MG/DL (ref 70–108)
GLUCOSE BLD-MCNC: 140 MG/DL (ref 70–108)
GLUCOSE BLD-MCNC: 214 MG/DL (ref 70–108)
GLUCOSE BLD-MCNC: 307 MG/DL (ref 70–108)
GLUCOSE BLD-MCNC: 86 MG/DL (ref 70–108)
HCT VFR BLD CALC: 40.5 % (ref 42–52)
HEMOGLOBIN: 12.9 GM/DL (ref 14–18)
MCH RBC QN AUTO: 24.4 PG (ref 26–33)
MCHC RBC AUTO-ENTMCNC: 31.9 GM/DL (ref 32.2–35.5)
MCV RBC AUTO: 76.6 FL (ref 80–94)
PLATELET # BLD: 803 THOU/MM3 (ref 130–400)
PMV BLD AUTO: 8.7 FL (ref 9.4–12.4)
POTASSIUM SERPL-SCNC: 4.6 MEQ/L (ref 3.5–5.2)
RBC # BLD: 5.29 MILL/MM3 (ref 4.7–6.1)
SODIUM BLD-SCNC: 140 MEQ/L (ref 135–145)
WBC # BLD: 15.8 THOU/MM3 (ref 4.8–10.8)

## 2022-02-01 PROCEDURE — 2580000003 HC RX 258: Performed by: INTERNAL MEDICINE

## 2022-02-01 PROCEDURE — 6370000000 HC RX 637 (ALT 250 FOR IP): Performed by: PHYSICIAN ASSISTANT

## 2022-02-01 PROCEDURE — 80048 BASIC METABOLIC PNL TOTAL CA: CPT

## 2022-02-01 PROCEDURE — 6360000002 HC RX W HCPCS: Performed by: INTERNAL MEDICINE

## 2022-02-01 PROCEDURE — 71046 X-RAY EXAM CHEST 2 VIEWS: CPT

## 2022-02-01 PROCEDURE — 85027 COMPLETE CBC AUTOMATED: CPT

## 2022-02-01 PROCEDURE — 99233 SBSQ HOSP IP/OBS HIGH 50: CPT | Performed by: INTERNAL MEDICINE

## 2022-02-01 PROCEDURE — 1200000000 HC SEMI PRIVATE

## 2022-02-01 PROCEDURE — 6370000000 HC RX 637 (ALT 250 FOR IP): Performed by: INTERNAL MEDICINE

## 2022-02-01 PROCEDURE — 82948 REAGENT STRIP/BLOOD GLUCOSE: CPT

## 2022-02-01 PROCEDURE — 36415 COLL VENOUS BLD VENIPUNCTURE: CPT

## 2022-02-01 RX ADMIN — LEVOFLOXACIN 750 MG: 5 INJECTION, SOLUTION INTRAVENOUS at 15:59

## 2022-02-01 RX ADMIN — INSULIN LISPRO 2 UNITS: 100 INJECTION, SOLUTION INTRAVENOUS; SUBCUTANEOUS at 12:58

## 2022-02-01 RX ADMIN — INSULIN GLARGINE 15 UNITS: 100 INJECTION, SOLUTION SUBCUTANEOUS at 20:49

## 2022-02-01 RX ADMIN — ACETAMINOPHEN 650 MG: 325 TABLET ORAL at 16:00

## 2022-02-01 RX ADMIN — ATORVASTATIN CALCIUM 10 MG: 10 TABLET, FILM COATED ORAL at 20:46

## 2022-02-01 RX ADMIN — TRAMADOL HYDROCHLORIDE 50 MG: 50 TABLET, COATED ORAL at 23:57

## 2022-02-01 RX ADMIN — INSULIN GLARGINE 15 UNITS: 100 INJECTION, SOLUTION SUBCUTANEOUS at 09:29

## 2022-02-01 RX ADMIN — HYDROCHLOROTHIAZIDE 25 MG: 25 TABLET ORAL at 09:12

## 2022-02-01 RX ADMIN — DEXAMETHASONE SODIUM PHOSPHATE 10 MG: 4 INJECTION, SOLUTION INTRAMUSCULAR; INTRAVENOUS at 09:13

## 2022-02-01 RX ADMIN — AMLODIPINE BESYLATE 10 MG: 10 TABLET ORAL at 09:12

## 2022-02-01 RX ADMIN — ENOXAPARIN SODIUM 70 MG: 100 INJECTION SUBCUTANEOUS at 20:46

## 2022-02-01 RX ADMIN — SODIUM CHLORIDE, PRESERVATIVE FREE 10 ML: 5 INJECTION INTRAVENOUS at 20:47

## 2022-02-01 RX ADMIN — SODIUM CHLORIDE, PRESERVATIVE FREE 10 ML: 5 INJECTION INTRAVENOUS at 09:11

## 2022-02-01 RX ADMIN — INSULIN LISPRO 2 UNITS: 100 INJECTION, SOLUTION INTRAVENOUS; SUBCUTANEOUS at 20:49

## 2022-02-01 RX ADMIN — TRAMADOL HYDROCHLORIDE 50 MG: 50 TABLET, COATED ORAL at 09:12

## 2022-02-01 RX ADMIN — INSULIN LISPRO 4 UNITS: 100 INJECTION, SOLUTION INTRAVENOUS; SUBCUTANEOUS at 00:26

## 2022-02-01 RX ADMIN — INSULIN LISPRO 8 UNITS: 100 INJECTION, SOLUTION INTRAVENOUS; SUBCUTANEOUS at 17:34

## 2022-02-01 RX ADMIN — ENOXAPARIN SODIUM 70 MG: 100 INJECTION SUBCUTANEOUS at 09:12

## 2022-02-01 RX ADMIN — ACETAMINOPHEN 650 MG: 325 TABLET ORAL at 21:58

## 2022-02-01 RX ADMIN — LISINOPRIL 40 MG: 40 TABLET ORAL at 09:12

## 2022-02-01 ASSESSMENT — PAIN SCALES - GENERAL
PAINLEVEL_OUTOF10: 0
PAINLEVEL_OUTOF10: 6
PAINLEVEL_OUTOF10: 5
PAINLEVEL_OUTOF10: 6

## 2022-02-01 ASSESSMENT — PAIN DESCRIPTION - PAIN TYPE: TYPE: ACUTE PAIN

## 2022-02-01 ASSESSMENT — PAIN DESCRIPTION - LOCATION: LOCATION: CHEST

## 2022-02-01 NOTE — PROGRESS NOTES
Hospitalist Progress Note  [facility] Med Surg 8B       Patient: Shayna Camera  Unit/Bed: 8B-23/023-A  YOB: 1970  MRN: [id number]  Acct: [de-identified]   AdmittingDiagnosis: Pneumonia [J18.9]  Pneumonia of right lower lobe due to infectious organism [J18.9]  Mass of left lung [R91.8]  COVID-19 virus infection [U07.1]  Admit Date: 1/27/2022  Hospital Day: 5    Subjective:    Denies any new or acute symptoms however RN reports fluctuating blood sugar levels and his white cell count has increased this morning    Objective:   /88   Pulse 94   Temp 97.7 °F (36.5 °C) (Oral)   Resp 18   Ht 5' 7\" (1.702 m)   Wt 165 lb (74.8 kg)   SpO2 96%   BMI 25.84 kg/m²     Intake/Output Summary (Last 24 hours) at 2/1/2022 1456  Last data filed at 2/1/2022 0844  Gross per 24 hour   Intake 4790.46 ml   Output 1100 ml   Net 3690.46 ml     Physical Exam    Constitutional:   Appears comfortable but ill looking and cachectic     HENT: Head: Normocephalic, atraumatic, Bilateral external ears normal, Oropharynx mosit, No oral exudates, Nose normal.   Eyes: PERRL, EOMI, Conjunctiva normal, No discharge. No scleral icterus  Neck: Normal range of motion, No tenderness, Supple  Lympatics: No lymphadenopathy. Cardiovascular: Normal rate, regular rhythm, S1 normal and S2 normal.  Exam reveals no gallop.    Pulmonary/Chest: Effort is slightly increased, diminished lung sounds right lower lobe, mild respiratory distress with tachypnea.      Abdominal: Soft. Bowel sounds are normal.  exhibits no distension. There is no tenderness. There is no rebound and no guarding. Extremities: No edema, no tenderness, no cyanosis, no clubbing.    Musculoskeletal: Good range of motion in major joints is observed.  No major deformities noted. Neurological: Alert and oriented ×3, normal motor function, normal sensory function, no focal deficits.  GCS 15. Skin: Skin is warm, dry and intact. No rash noted. No erythema.    Psychiatric: Affect normal, judgment normal, mood normal.     DVT Prophylaxis:   Lovenox     Data:  CBC:   Lab Results   Component Value Date    WBC 15.8 02/01/2022    HGB 12.9 02/01/2022    HCT 40.5 02/01/2022    MCV 76.6 02/01/2022     02/01/2022     BMP:   Lab Results   Component Value Date     02/01/2022    K 4.6 02/01/2022    K 4.2 01/27/2022     02/01/2022    CO2 26 02/01/2022    BUN 22 02/01/2022    CREATININE 1.1 02/01/2022    CALCIUM 9.5 02/01/2022     ABGs: No results found for: PH, PCO2, PO2, HCO3, O2SAT  Troponin: No results found for: TROPONINI   LFTs   Lab Results   Component Value Date    AST 22 01/27/2022    ALT 19 01/27/2022    BILITOT 0.6 01/27/2022    ALKPHOS 178 01/27/2022          Imaging   XR CHEST (2 VW)    Result Date: 2/1/2022  PROCEDURE: XR CHEST (2 VW) CLINICAL INFORMATION: 15-year-old male with shortness of breath. COMPARISON: No prior study. TECHNIQUE: PA and lateral views of the chest were obtained. FINDINGS: There is redemonstration of a moderate right-sided pleural effusion which appears similar to the prior exam. Opacities are again seen near the right lung base. The cardiac silhouette is stable. There is no significant pleural effusion or pneumothorax. Visualized portions of the upper abdomen are within normal limits. The osseous structures are intact. No acute fractures or suspicious osseous lesions. 1. Moderate right pleural effusion which is similar to the prior exam. 2. Right basilar opacities which may represent atelectasis or infiltrate. The appearance has somewhat improved since the previous study. **This report has been created using voice recognition software. It may contain minor errors which are inherent in voice recognition technology. ** Final report electronically signed by Dr Zain Begum on 2/1/2022 9:09 AM    XR CHEST (2 VW)    Result Date: 1/27/2022  PROCEDURE: XR CHEST (2 VW) CLINICAL INFORMATION: 15-year-old male with sharp chest pain.  COMPARISON: No prior study. TECHNIQUE: PA and lateral views of the chest were obtained. FINDINGS: There is extensive pneumonia in the right mid and lower lung zone. There is a large right pleural effusion. The cardiac silhouette and pulmonary vasculature are within normal limits. There is no pneumothorax. Visualized portions of the upper abdomen are within normal limits. The osseous structures are intact. No acute fractures or suspicious osseous lesions. 1. Extensive pneumonia in the right mid and lower lung zone. 2. Large right pleural effusion. **This report has been created using voice recognition software. It may contain minor errors which are inherent in voice recognition technology. ** Final report electronically signed by Dr Kirit Patel on 1/27/2022 1:58 PM    CTA CHEST W WO CONTRAST    Result Date: 1/27/2022  PROCEDURE: CTA CHEST W WO CONTRAST CLINICAL INFORMATION: 61-year-old male with chest pain and shortness of breath. Elevated d-dimer. Right rib pain. COMPARISON: No prior study. TECHNIQUE: 3 mm axial images were obtained through the chest after the administration of IV contrast.  A non-contrast localizer was obtained. 3D reconstructions were performed on the scanner to include MIP images through the right and left pulmonary arteries and sagittal images through the chest. Isovue was the intravenous contrast utilized. All CT scans at this facility use dose modulation, iterative reconstruction, and/or weight-based dosing when appropriate to reduce radiation dose to as low as reasonably achievable. FINDINGS: The thyroid gland is present. The central airways are patent. There is cardiomegaly. There is adequate opacification of the pulmonary arteries. There are no pulmonary emboli. There are emphysematous changes in the lungs, predominantly in the apices. There is a mass in the left upper lobe which measures 3.2 x 1.5 cm. There is a somewhat loculated appearing pleural effusion on the right side.  There are adjacent areas of consolidation which may be a combination of pneumonia and atelectasis. There is no pneumothorax. There is a lipoma overlying the right side of the chest which measures 2.3 x 0.7 cm. There are no suspicious findings in the upper abdominal fields. There is a cyst of the right kidney. The bones are intact. 1. There is a large somewhat loculated appearing pleural effusion on the right side. There are adjacent areas of consolidation which may represent a combination of pneumonia and atelectasis. 2. There is a left upper lobe pulmonary mass. 3. Emphysematous changes. **This report has been created using voice recognition software. It may contain minor errors which are inherent in voice recognition technology. ** Final report electronically signed by Dr Tu Mcclain on 1/27/2022 3:38 PM    CT ABDOMEN PELVIS W IV CONTRAST Additional Contrast? None    Result Date: 1/27/2022  PROCEDURE: CT ABDOMEN PELVIS W IV CONTRAST CLINICAL INFORMATION: abd pain, Trauma . COMPARISON: None. TECHNIQUE: Axial 5 mm CT images were obtained through the abdomen and pelvis after the administration of intravenous contrast. Coronal and sagittal reconstructions were obtained. All CT scans at this facility use dose modulation, iterative reconstruction, and/or weight-based dosing when appropriate to reduce radiation dose to as low as reasonably achievable. FINDINGS: There is a right pleural effusion. There is probable infiltrate in the right lower lobe. . There is soft tissue swelling around the right lateral chest wall and tip of the scapula  The base of the heart is within appropriate limits. The liver is normal. The spleen is normal. The adrenals and pancreas are normal. The gallbladder is normal.    There is a 2.6 cm cyst in the right kidney. There is a 7.3 mm cyst in the left kidney. . No renal masses are noted. There is a small hiatal hernia No abnormalities of the small bowel loops are noted. The IVC and aorta are of normal caliber. There is no adenopathy. The urinary bladder is normal. There is pelvic free fluid. The colon is within normal limits. There is no adenopathy. No suspicious osseous lesions are identified. 1. Right pleural effusion. Probable infiltrate in the right lower lobe. 2. Soft tissue swelling overlying the right lateral chest wall and tip of scapula. 3. 2.6 cm right renal cyst. Small left renal cysts. 4. Small hiatal hernia. 5. There is free fluid within the pelvis. 6. Otherwise negative CT scan of the abdomen and pelvis. **This report has been created using voice recognition software. It may contain minor errors which are inherent in voice recognition technology. ** Final report electronically signed by DR Nadine Sharif on 1/27/2022 3:39 PM    XR CHEST 1 VIEW    Result Date: 1/28/2022  PROCEDURE: XR CHEST 1 VIEW CLINICAL INFORMATION: post thora . COMPARISON: Chest radiographs dated 1/27/2022. TECHNIQUE: AP upright view of the chest. FINDINGS: There is decreased opacity at the right lung base compared to prior exam. No pneumothorax is identified. The left lung appears clear. The cardiac silhouette is mildly prominent. The aorta is tortuous, stable compared to prior exam. Visualized osseous structures appear grossly intact. Decreased right pleural effusion without evidence of pneumothorax status post right thoracentesis. **This report has been created using voice recognition software. It may contain minor errors which are inherent in voice recognition technology. ** Final report electronically signed by Dr. Johnny Treadwell MD on 1/28/2022 4:45 PM    US THORACENTESIS Which side should the procedure be performed? Right    Result Date: 1/28/2022  PROCEDURE: US THORACENTESIS CLINICAL INFORMATION: THoracentesis vs Biopsy of the lung mass . COMPARISON: CTA chest dated 1/27/2022. TECHNIQUE: Risks and benefits of the procedure were thoroughly explained and oral and written informed consent obtained.  The patient was placed in a sitting position at the edge of the ultrasound gurney and a suitable puncture site designated at an appropriate right posterior intercostal space. A timeout procedure was performed and the patient was prepped and draped in the usual fashion. 2% lidocaine was infiltrated in the subcutis tissues at the designated puncture site. A one-step 5 [place] needle/catheter system was directed into the effusion with prompt return of red-tinged/orange fluid. 300 mL fluid was aspirated with 43 mL sent to laboratory for further analysis. The catheter was then removed and a bandage placed. The patient tolerated the procedure well and no immediate postprocedural complication was identified. Physician performing procedure: Dr Derrick Mendoza Informed consent signed: Yes Local Anesthetic: 2 % Lidocaine Specimen volume: 43 ml Catheter: 19 ga 5 [place] Aspirated pleural fluid volume: 0.3 liters Aspirated pleural fluid color: Old heme tinged Complications: None observed FINDINGS: There are 4 saved ultrasound images. Preprocedure images show moderate right pleural effusion and post procedure images show small residual.      Successful ultrasound-guided right thoracentesis. **This report has been created using voice recognition software. It may contain minor errors which are inherent in voice recognition technology. ** Final report electronically signed by Dr. Olga Coulter MD on 1/28/2022 5:11 PM      Assessment/Plan:    1.  Right lower lobe pneumonia  continue IV antibiotics, improving monitor CBC.                  2.  Right pleural effusion  S/p thoracentesis,culture negative, Cytology pending      3.  Covid positive  cont Decadron and  Lovenox and PRN oxygen     4.  Left upper lobe pulmonary mass  Referred to interventional radiology for possible biopsy consult oncology as outpatient     5. COPD with history of tobacco abuse  Encouraged to quit we will place nicotine patch and continue bronchodilator treatment along with IV steroids and antibiotics, oxygen supplementation as needed     6.  Type 2 diabetes  Continue Lantus and regular insulin to scale per protocol     7.  Hypertension  Continue current management        Electronically signed by Will Abad MD on 2/1/2022 at 2:56 PM    Rounding Hospitalist

## 2022-02-01 NOTE — CARE COORDINATION
Discharge Planning Update:     Hospital day: 5    Following Covid and pneumonia. Blood sugar elevation today (300). Pt currently on room air. Afebrile. CXR today reveals moderate rt pleural effusion. Plans to return home with his SO. New PCP arranged for pt at the [address] [place] [address] as this is closer to his residence and he has to walk to appts at times.  Appt is with [facility].

## 2022-02-02 VITALS
RESPIRATION RATE: 18 BRPM | SYSTOLIC BLOOD PRESSURE: 146 MMHG | OXYGEN SATURATION: 97 % | HEART RATE: 98 BPM | BODY MASS INDEX: 25.9 KG/M2 | WEIGHT: 165 LBS | TEMPERATURE: 98.4 F | DIASTOLIC BLOOD PRESSURE: 77 MMHG | HEIGHT: 67 IN

## 2022-02-02 LAB
ANAEROBIC CULTURE: ABNORMAL
ANION GAP SERPL CALCULATED.3IONS-SCNC: 18 MEQ/L (ref 8–16)
BLOOD CULTURE, ROUTINE: NORMAL
BLOOD CULTURE, ROUTINE: NORMAL
BODY FLUID CULTURE, STERILE: ABNORMAL
BODY FLUID CULTURE, STERILE: ABNORMAL
BUN BLDV-MCNC: 27 MG/DL (ref 7–22)
C-REACTIVE PROTEIN: 1.93 MG/DL (ref 0–1)
CALCIUM SERPL-MCNC: 9.9 MG/DL (ref 8.5–10.5)
CHLORIDE BLD-SCNC: 102 MEQ/L (ref 98–111)
CO2: 21 MEQ/L (ref 23–33)
CREAT SERPL-MCNC: 1.2 MG/DL (ref 0.4–1.2)
D-DIMER QUANTITATIVE: 1883 NG/ML FEU (ref 0–500)
ERYTHROCYTE [DISTWIDTH] IN BLOOD BY AUTOMATED COUNT: 15.4 % (ref 11.5–14.5)
ERYTHROCYTE [DISTWIDTH] IN BLOOD BY AUTOMATED COUNT: 43 FL (ref 35–45)
GLUCOSE BLD-MCNC: 207 MG/DL (ref 70–108)
GLUCOSE BLD-MCNC: 239 MG/DL (ref 70–108)
GLUCOSE BLD-MCNC: 69 MG/DL (ref 70–108)
GRAM STAIN RESULT: ABNORMAL
HCT VFR BLD CALC: 43.6 % (ref 42–52)
HEMOGLOBIN: 13.5 GM/DL (ref 14–18)
MCH RBC QN AUTO: 24.2 PG (ref 26–33)
MCHC RBC AUTO-ENTMCNC: 31 GM/DL (ref 32.2–35.5)
MCV RBC AUTO: 78.1 FL (ref 80–94)
ORGANISM: ABNORMAL
PLATELET # BLD: 842 THOU/MM3 (ref 130–400)
PMV BLD AUTO: 8.7 FL (ref 9.4–12.4)
POTASSIUM SERPL-SCNC: 5.5 MEQ/L (ref 3.5–5.2)
RBC # BLD: 5.58 MILL/MM3 (ref 4.7–6.1)
SODIUM BLD-SCNC: 141 MEQ/L (ref 135–145)
WBC # BLD: 18.4 THOU/MM3 (ref 4.8–10.8)

## 2022-02-02 PROCEDURE — 86140 C-REACTIVE PROTEIN: CPT

## 2022-02-02 PROCEDURE — 6360000002 HC RX W HCPCS: Performed by: INTERNAL MEDICINE

## 2022-02-02 PROCEDURE — 36415 COLL VENOUS BLD VENIPUNCTURE: CPT

## 2022-02-02 PROCEDURE — 6370000000 HC RX 637 (ALT 250 FOR IP): Performed by: INTERNAL MEDICINE

## 2022-02-02 PROCEDURE — 85379 FIBRIN DEGRADATION QUANT: CPT

## 2022-02-02 PROCEDURE — 85027 COMPLETE CBC AUTOMATED: CPT

## 2022-02-02 PROCEDURE — 80048 BASIC METABOLIC PNL TOTAL CA: CPT

## 2022-02-02 PROCEDURE — 99239 HOSP IP/OBS DSCHRG MGMT >30: CPT | Performed by: PHYSICIAN ASSISTANT

## 2022-02-02 PROCEDURE — 2580000003 HC RX 258: Performed by: INTERNAL MEDICINE

## 2022-02-02 PROCEDURE — 82948 REAGENT STRIP/BLOOD GLUCOSE: CPT

## 2022-02-02 RX ORDER — AMLODIPINE BESYLATE 10 MG/1
10 TABLET ORAL DAILY
Qty: 30 TABLET | Refills: 3 | Status: SHIPPED | OUTPATIENT
Start: 2022-02-03

## 2022-02-02 RX ORDER — HYDROCHLOROTHIAZIDE 25 MG/1
25 TABLET ORAL DAILY
Qty: 30 TABLET | Refills: 3 | Status: SHIPPED | OUTPATIENT
Start: 2022-02-03

## 2022-02-02 RX ORDER — LEVOFLOXACIN 750 MG/1
750 TABLET ORAL DAILY
Qty: 5 TABLET | Refills: 0 | Status: ON HOLD | OUTPATIENT
Start: 2022-02-02 | End: 2022-02-10 | Stop reason: HOSPADM

## 2022-02-02 RX ORDER — DEXAMETHASONE 6 MG/1
6 TABLET ORAL
Qty: 3 TABLET | Refills: 0 | Status: SHIPPED | OUTPATIENT
Start: 2022-02-02 | End: 2022-02-05

## 2022-02-02 RX ORDER — LISINOPRIL 40 MG/1
40 TABLET ORAL DAILY
Qty: 30 TABLET | Refills: 3 | Status: ON HOLD | OUTPATIENT
Start: 2022-02-03 | End: 2022-02-10 | Stop reason: HOSPADM

## 2022-02-02 RX ADMIN — HYDROCHLOROTHIAZIDE 25 MG: 25 TABLET ORAL at 07:52

## 2022-02-02 RX ADMIN — SODIUM CHLORIDE, PRESERVATIVE FREE 10 ML: 5 INJECTION INTRAVENOUS at 07:53

## 2022-02-02 RX ADMIN — INSULIN GLARGINE 15 UNITS: 100 INJECTION, SOLUTION SUBCUTANEOUS at 08:03

## 2022-02-02 RX ADMIN — DEXAMETHASONE SODIUM PHOSPHATE 10 MG: 4 INJECTION, SOLUTION INTRAMUSCULAR; INTRAVENOUS at 10:00

## 2022-02-02 RX ADMIN — ENOXAPARIN SODIUM 70 MG: 100 INJECTION SUBCUTANEOUS at 07:51

## 2022-02-02 RX ADMIN — ACETAMINOPHEN 650 MG: 325 TABLET ORAL at 07:51

## 2022-02-02 RX ADMIN — AMLODIPINE BESYLATE 10 MG: 10 TABLET ORAL at 07:51

## 2022-02-02 RX ADMIN — LISINOPRIL 40 MG: 40 TABLET ORAL at 07:52

## 2022-02-02 ASSESSMENT — PAIN SCALES - GENERAL
PAINLEVEL_OUTOF10: 5
PAINLEVEL_OUTOF10: 4
PAINLEVEL_OUTOF10: 6

## 2022-02-02 NOTE — CARE COORDINATION
Discharge Planning Update:     Hospital day: 6    Following pneumonia and Covid. Pt has mod rt pleural effusion. Pt remains on room air however. Afebrile. HR 80's-90's. IVF at 75/hr. Cont on Decadron. HCTZ. Lantus. CRP now down to 1.93. D-Dimer coming down. K+ 5.5 today. WBC's 18.4, up from yesterday. Plans home with SO. O2 eval performed today and no need for home oxygen. Anticipate discharge soon.

## 2022-02-02 NOTE — PROGRESS NOTES
Patient given discharge information. Educated on new medication/medication changes, follow-up appointments, and answered all questions appropriately. Patient belongings with patient.

## 2022-02-02 NOTE — PROGRESS NOTES
A home oxygen evaluation has been completed. []Patient is an inpatient. It is expected that the patient will be discharged within the next 48 hours. Qualified provider to write orders for possible sleep study or home oxygen prescription. Social service/care managers will arrange for home oxygen if ordered. If patient is active, arrange for Home Medical supplier to assess for Oxygen Conserving Device per pulse oximetry. []Patient is an outpatient. Results will be faxed to the ordering provider. Qualified provider to write orders for possible sleep study or home oxygen prescription. Patient was placed on room air for greater than 20 minutes. SpO2 was 97 % on room air at rest. Patients SpO2 was 89% or above and did not qualify for home oxygen. Patient was walked for 6 minutes. SpO2 was 99 % during walking. Patients SpO2 was 89% or above and did not qualify for home oxygen. Patient does not have a positive pressure airway device at home. Patient is not  diagnosed with Obstructive Sleep Apnea. Patient will not be set up/instructed on a nocturnal study. Results will be given to qualified provider. Note: For any SpO2 at 22% see policy and procedure for possible qualifications.

## 2022-02-03 ENCOUNTER — CARE COORDINATION (OUTPATIENT)
Dept: CASE MANAGEMENT | Age: 52
End: 2022-02-03

## 2022-02-03 LAB
BLOOD CULTURE, ROUTINE: NORMAL
BLOOD CULTURE, ROUTINE: NORMAL

## 2022-02-03 NOTE — CARE COORDINATION
Covid-19 Initial Follow Up Call    Transitions of Care Call  Call within 2 business days of discharge: Yes    Patient: Shante Swan Patient : 1970   MRN: [id number]  Reason for Admission: Covid  Discharge Date: 22 RARS: Readmission Risk Score: 8.8 ( )      Last Discharge [facility]       Complaint Diagnosis Description Type Department Provider    22 Chest Pain Mass of left lung . .. ED to Hosp-Admission (Discharged) (ADMITTED) CHET [address]B [place], [place]; Karyle Pott, ... Spoke with Dedra Palumbo, said he is doing good. Has a little bit of right side chest pain and taking Tylenol. Denies any Covid symptoms. Is to f/u with pulmonary d/t lung mass. He was getting ready to call for appt, has number. Has a f/u with new PCP on . Appetite and fluid intake is good. Reviewed new/changed medications. He is going to call his insurance about getting a BP kit. Denies any needs. Challenges to be reviewed by the provider   Additional needs identified to be addressed with provider: No  none                 Encounter was not routed to provider for escalation. Method of communication with provider: none. Discussed COVID-19 related testing which was: available at this time. Test results were: positive. Patient informed of results, if available? Yes. Current Symptoms: no new symptoms and no worsening symptoms    Reviewed New or Changed Meds: yes    Do you have what you need at home?  Durable Medical Equipment ordered at discharge: None   Home Health/Outpatient orders at discharge: none   Was patient discharged with a pulse oximeter? No     Patient education provided: Reviewed appropriate site of care based on symptoms and resources available to patient including: PCP and Specialist.     Follow up appointment scheduled within 7 days of discharge: yes. If no appointment scheduled, scheduling offered: NA  No future appointments.     Interventions: Scheduled appointment with PCP-  Scheduled appointment with Specialist-pt call pulmonary today  Obtained and reviewed discharge summary and/or continuity of care documents  Reviewed discharge instructions, medical action plan and red flags with patient who verbalized understanding. No further follow-up call indicated based on severity of symptoms and risk factors. Non [facility] provider. Provided contact information for future needs.     Chavez Sprague RN

## 2022-02-03 NOTE — DISCHARGE SUMMARY
Hospitalist Discharge Summary        Patient: Med Ballesteros  YOB: 1970  MRN: [id number]   Acct: [de-identified]    Primary Care Physician: BARBARA Wilson CNP    Admit date  1/27/2022    Discharge date: 2/2/2022 11:31 AM    Chief Complaint on presentation :-  COVID-19    Discharge Assessment and Plan:-   [id number] PNA with superimposed bacterial PNA: Patient tested positive for COVID-19. Patient was afebrile on DC. Patient prescribed COVID-19 therapies: Baricitninb & Decadron . Continue Vitamin C, Vitamin D, Zinc.  Patient was prescribed the rest of Decadron treatment of a total of 10 days. Pleural Effusion, right: Pt had a thoracentesis while inpatient; tolerated it well. Cytology did not show any malignant cells. Pt will follow up with Pulm as an OP. SHAGUFTA Pulmonary Mass: CTA chest (1/27/2022) showed 3.2 x1.5 mass. Pt is aware of mass. Pt will follow up with Pulm as an OP. COPD: Aware. Continue breathing treatment regimens. IDDMII: Continue home medications. Essential HTN: Continue home medications. Initial H and P and Hospital course:-  Initial H&P \"Apolinar Posey is a 49 y. o. male who presents with complaint of right-sided chest pain, states the pain travels into his back and right upper abdomen.  He has had pain for about a week.  Pain is made worse with inspiration, truncal rotation.  Pain not made worse with palpation of the chest wall. He has no history of cancer.  He smokes about half a pack per day. \"     1/28: Pt had thoracentesis. Tolerated it well. 1/29: Pt's D-Dimer was elevated and patient was started on Full dose Lovenox. 1/30: Pt continued on ABX for PNA.  2/2: Pt did a Home O2 eval and did not require any supplemental oxygen. On the day of discharge, it was explained to the patient that it was very important to follow up with his PCP and Pulm to have continued care. Appointments were made and information was given.          Physical Exam:-  Vitals:   No data found.  Weight:   Weight: 165 lb (74.8 kg)   24 hour intake/output:   No intake or output data in the 24 hours ending 02/03/22 0948    1. General appearance: No apparent distress, appears stated age and cooperative. 2. HEENT: Normal cephalic, atraumatic without obvious deformity. Pupils equal, round, and reactive to light. Extra ocular muscles intact. Conjunctivae/corneas clear. 3. Neck: Supple, with full range of motion. No jugular venous distention. Trachea midline. 4. Respiratory:  Normal respiratory effort. Clear to auscultation, bilaterally without Rales/Wheezes/Rhonchi. 5. Cardiovascular: Regular rate and rhythm with normal S1/S2 without murmurs, rubs or gallops. 6. Abdomen: Soft, non-tender, non-distended with normal bowel sounds. 7. Musculoskeletal:  No clubbing, cyanosis or edema bilaterally. 8. Skin: Skin color, texture, turgor normal.  No rashes or lesions. 9. Neurologic:  Neurovascularly intact without any focal sensory/motor deficits. Cranial nerves: II-XII intact, grossly non-focal.  10. Psychiatric: Alert and oriented, thought content appropriate, normal insight  11. Capillary Refill: Brisk,< 3 seconds   12.  Peripheral Pulses: +2 palpable, equal bilaterally       Discharge Medications:-      Medication List      START taking these medications    amLODIPine 10 MG tablet  Commonly known as: NORVASC  Take 1 tablet by mouth daily     dexamethasone 6 MG tablet  Commonly known as: DECADRON  Take 1 tablet by mouth daily (with breakfast) for 3 days     hydroCHLOROthiazide 25 MG tablet  Commonly known as: HYDRODIURIL  Take 1 tablet by mouth daily     levoFLOXacin 750 MG tablet  Commonly known as: Levaquin  Take 1 tablet by mouth daily for 5 days        CHANGE how you take these medications    lisinopril 40 MG tablet  Commonly known as: PRINIVIL;ZESTRIL  Take 1 tablet by mouth daily  What changed:   · medication strength  · how much to take        CONTINUE taking these medications    acetaminophen 325 MG tablet  Commonly known as: Aminofen  Take 2 tablets by mouth every 6 hours as needed for Pain     atorvastatin 40 MG tablet  Commonly known as: LIPITOR  take 1 tablet by mouth once daily     fluticasone 50 MCG/ACT nasal spray  Commonly known as: Flonase  1 spray by Each Nare route daily     loratadine 10 MG tablet  Commonly known as: Claritin  Take 1 tablet by mouth daily        STOP taking these medications    lidocaine viscous 2 % solution  Commonly known as: XYLOCAINE     lidocaine viscous hcl 2 % Soln solution  Commonly known as: XYLOCAINE     naproxen 500 MG tablet  Commonly known as: Naprosyn           Where to Get Your Medications      These medications were sent to [facility] [address], [address], [address] [facility]    Phone: [phone]   · amLODIPine 10 MG tablet  · dexamethasone 6 MG tablet  · hydroCHLOROthiazide 25 MG tablet  · levoFLOXacin 750 MG tablet  · lisinopril 40 MG tablet          Labs :-  Recent Results (from the past 72 hour(s))   POCT glucose    Collection Time: 01/31/22 11:31 AM   Result Value Ref Range    POC Glucose 197 (H) 70 - 108 mg/dl   POCT glucose    Collection Time: 01/31/22  5:29 PM   Result Value Ref Range    POC Glucose 295 (H) 70 - 108 mg/dl   POCT glucose    Collection Time: 01/31/22  8:00 PM   Result Value Ref Range    POC Glucose 590 (HH) 70 - 108 mg/dl   POCT glucose    Collection Time: 01/31/22 11:31 PM   Result Value Ref Range    POC Glucose 328 (H) 70 - 108 mg/dl   CBC    Collection Time: 02/01/22  6:20 AM   Result Value Ref Range    WBC 15.8 (H) 4.8 - 10.8 thou/mm3    RBC 5.29 4.70 - 6.10 mill/mm3    Hemoglobin 12.9 (L) 14.0 - 18.0 gm/dl    Hematocrit 40.5 (L) 42.0 - 52.0 %    MCV 76.6 (L) 80.0 - 94.0 fL    MCH 24.4 (L) 26.0 - 33.0 pg    MCHC 31.9 (L) 32.2 - 35.5 gm/dl    RDW-CV 15.3 (H) 11.5 - 14.5 %    RDW-SD 41.9 35.0 - 45.0 fL    Platelets 752 (H) 610 - 400 thou/mm3 MPV 8.7 (L) 9.4 - 12.4 fL   Basic Metabolic Panel    Collection Time: 02/01/22  6:20 AM   Result Value Ref Range    Sodium 140 135 - 145 meq/L    Potassium 4.6 3.5 - 5.2 meq/L    Chloride 101 98 - 111 meq/L    CO2 26 23 - 33 meq/L    Glucose 132 (H) 70 - 108 mg/dL    BUN 22 7 - 22 mg/dL    CREATININE 1.1 0.4 - 1.2 mg/dL    Calcium 9.5 8.5 - 10.5 mg/dL   Anion Gap    Collection Time: 02/01/22  6:20 AM   Result Value Ref Range    Anion Gap 13.0 8.0 - 16.0 meq/L   Glomerular Filtration Rate, Estimated    Collection Time: 02/01/22  6:20 AM   Result Value Ref Range    Est, Glom Filt Rate 85 (A) ml/min/1.73m2   POCT glucose    Collection Time: 02/01/22  8:03 AM   Result Value Ref Range    POC Glucose 86 70 - 108 mg/dl   POCT glucose    Collection Time: 02/01/22 12:16 PM   Result Value Ref Range    POC Glucose 140 (H) 70 - 108 mg/dl   POCT glucose    Collection Time: 02/01/22  4:40 PM   Result Value Ref Range    POC Glucose 307 (H) 70 - 108 mg/dl   POCT glucose    Collection Time: 02/01/22  8:37 PM   Result Value Ref Range    POC Glucose 214 (H) 70 - 108 mg/dl   CBC    Collection Time: 02/02/22  5:38 AM   Result Value Ref Range    WBC 18.4 (H) 4.8 - 10.8 thou/mm3    RBC 5.58 4.70 - 6.10 mill/mm3    Hemoglobin 13.5 (L) 14.0 - 18.0 gm/dl    Hematocrit 43.6 42.0 - 52.0 %    MCV 78.1 (L) 80.0 - 94.0 fL    MCH 24.2 (L) 26.0 - 33.0 pg    MCHC 31.0 (L) 32.2 - 35.5 gm/dl    RDW-CV 15.4 (H) 11.5 - 14.5 %    RDW-SD 43.0 35.0 - 45.0 fL    Platelets 926 (H) 792 - 400 thou/mm3    MPV 8.7 (L) 9.4 - 12.4 fL   Basic Metabolic Panel    Collection Time: 02/02/22  5:38 AM   Result Value Ref Range    Sodium 141 135 - 145 meq/L    Potassium 5.5 (H) 3.5 - 5.2 meq/L    Chloride 102 98 - 111 meq/L    CO2 21 (L) 23 - 33 meq/L    Glucose 207 (H) 70 - 108 mg/dL    BUN 27 (H) 7 - 22 mg/dL    CREATININE 1.2 0.4 - 1.2 mg/dL    Calcium 9.9 8.5 - 10.5 mg/dL   D-Dimer, Quantitative    Collection Time: 02/02/22  5:38 AM   Result Value Ref Range D-Dimer, Quant 1883.00 (H) 0.00 - 500.00 ng/ml FEU   C-Reactive Protein    Collection Time: 02/02/22  5:38 AM   Result Value Ref Range    CRP 1.93 (H) 0.00 - 1.00 mg/dl   Anion Gap    Collection Time: 02/02/22  5:38 AM   Result Value Ref Range    Anion Gap 18.0 (H) 8.0 - 16.0 meq/L   Glomerular Filtration Rate, Estimated    Collection Time: 02/02/22  5:38 AM   Result Value Ref Range    Est, Glom Filt Rate 77 (A) ml/min/1.73m2   POCT glucose    Collection Time: 02/02/22  8:33 AM   Result Value Ref Range    POC Glucose 69 (L) 70 - 108 mg/dl   POCT glucose    Collection Time: 02/02/22 10:00 AM   Result Value Ref Range    POC Glucose 239 (H) 70 - 108 mg/dl        Microbiology:    Blood culture #1:   Lab Results   Component Value Date    BC No growth-preliminary No growth  01/28/2022    BC No growth-preliminary No growth  01/28/2022       Blood culture #2:No results found for: BLOODCULT2    Organism:    Lab Results   Component Value Date    LABGRAM  01/28/2022     Many segmented neutrophils observed. No organisms observed. performed on cytospun specimen        MRSA culture only:No results found for: [facility]    Urine culture:   Lab Results   Component Value Date    LABURIN 200 04/28/2016     Lab Results   Component Value Date    ORG Staphylococcus hominis ssp. hominis 01/28/2022        Respiratory culture: No results found for: CULTRESP    Aerobic and Anaerobic :  No results found for: LABAERO  Lab Results   Component Value Date    LABANAE No growth-preliminary No growth  01/28/2022       Urinalysis:      Lab Results   Component Value Date    NITRU NEGATIVE 09/28/2021    BLOODU NEGATIVE 09/28/2021    GLUCOSEU NEGATIVE 09/28/2021       Radiology:-  XR CHEST (2 VW)    Result Date: 1/27/2022  PROCEDURE: XR CHEST (2 VW) CLINICAL INFORMATION: 52-year-old male with sharp chest pain. COMPARISON: No prior study. TECHNIQUE: PA and lateral views of the chest were obtained.  FINDINGS: There is extensive pneumonia in the right mid and lower lung zone. There is a large right pleural effusion. The cardiac silhouette and pulmonary vasculature are within normal limits. There is no pneumothorax. Visualized portions of the upper abdomen are within normal limits. The osseous structures are intact. No acute fractures or suspicious osseous lesions. 1. Extensive pneumonia in the right mid and lower lung zone. 2. Large right pleural effusion. **This report has been created using voice recognition software. It may contain minor errors which are inherent in voice recognition technology. ** Final report electronically signed by Dr Isidro Zamora on 1/27/2022 1:58 PM    CTA CHEST W WO CONTRAST    Result Date: 1/27/2022  PROCEDURE: CTA CHEST W WO CONTRAST CLINICAL INFORMATION: 45-year-old male with chest pain and shortness of breath. Elevated d-dimer. Right rib pain. COMPARISON: No prior study. TECHNIQUE: 3 mm axial images were obtained through the chest after the administration of IV contrast.  A non-contrast localizer was obtained. 3D reconstructions were performed on the scanner to include MIP images through the right and left pulmonary arteries and sagittal images through the chest. Isovue was the intravenous contrast utilized. All CT scans at this facility use dose modulation, iterative reconstruction, and/or weight-based dosing when appropriate to reduce radiation dose to as low as reasonably achievable. FINDINGS: The thyroid gland is present. The central airways are patent. There is cardiomegaly. There is adequate opacification of the pulmonary arteries. There are no pulmonary emboli. There are emphysematous changes in the lungs, predominantly in the apices. There is a mass in the left upper lobe which measures 3.2 x 1.5 cm. There is a somewhat loculated appearing pleural effusion on the right side. There are adjacent areas of consolidation which may be a combination of pneumonia and atelectasis. There is no pneumothorax.  There is a lipoma overlying the right side of the chest which measures 2.3 x 0.7 cm. There are no suspicious findings in the upper abdominal fields. There is a cyst of the right kidney. The bones are intact. 1. There is a large somewhat loculated appearing pleural effusion on the right side. There are adjacent areas of consolidation which may represent a combination of pneumonia and atelectasis. 2. There is a left upper lobe pulmonary mass. 3. Emphysematous changes. **This report has been created using voice recognition software. It may contain minor errors which are inherent in voice recognition technology. ** Final report electronically signed by Dr Chreise Choudhary on 1/27/2022 3:38 PM    CT ABDOMEN PELVIS W IV CONTRAST Additional Contrast? None    Result Date: 1/27/2022  PROCEDURE: CT ABDOMEN PELVIS W IV CONTRAST CLINICAL INFORMATION: abd pain, Trauma . COMPARISON: None. TECHNIQUE: Axial 5 mm CT images were obtained through the abdomen and pelvis after the administration of intravenous contrast. Coronal and sagittal reconstructions were obtained. All CT scans at this facility use dose modulation, iterative reconstruction, and/or weight-based dosing when appropriate to reduce radiation dose to as low as reasonably achievable. FINDINGS: There is a right pleural effusion. There is probable infiltrate in the right lower lobe. . There is soft tissue swelling around the right lateral chest wall and tip of the scapula  The base of the heart is within appropriate limits. The liver is normal. The spleen is normal. The adrenals and pancreas are normal. The gallbladder is normal.    There is a 2.6 cm cyst in the right kidney. There is a 7.3 mm cyst in the left kidney. . No renal masses are noted. There is a small hiatal hernia No abnormalities of the small bowel loops are noted. The IVC and aorta are of normal caliber. There is no adenopathy. The urinary bladder is normal. There is pelvic free fluid. The colon is within normal limits.   There is no adenopathy. No suspicious osseous lesions are identified. 1. Right pleural effusion. Probable infiltrate in the right lower lobe. 2. Soft tissue swelling overlying the right lateral chest wall and tip of scapula. 3. 2.6 cm right renal cyst. Small left renal cysts. 4. Small hiatal hernia. 5. There is free fluid within the pelvis. 6. Otherwise negative CT scan of the abdomen and pelvis. **This report has been created using voice recognition software. It may contain minor errors which are inherent in voice recognition technology. ** Final report electronically signed by DR Levi Arteaga on 1/27/2022 3:39 PM    XR CHEST 1 VIEW    Result Date: 1/28/2022  PROCEDURE: XR CHEST 1 VIEW CLINICAL INFORMATION: post thora . COMPARISON: Chest radiographs dated 1/27/2022. TECHNIQUE: AP upright view of the chest. FINDINGS: There is decreased opacity at the right lung base compared to prior exam. No pneumothorax is identified. The left lung appears clear. The cardiac silhouette is mildly prominent. The aorta is tortuous, stable compared to prior exam. Visualized osseous structures appear grossly intact. Decreased right pleural effusion without evidence of pneumothorax status post right thoracentesis. **This report has been created using voice recognition software. It may contain minor errors which are inherent in voice recognition technology. ** Final report electronically signed by Dr. Pilo Franks MD on 1/28/2022 4:45 PM    US THORACENTESIS Which side should the procedure be performed? Right    Result Date: 1/28/2022  PROCEDURE: US THORACENTESIS CLINICAL INFORMATION: THoracentesis vs Biopsy of the lung mass . COMPARISON: CTA chest dated 1/27/2022. TECHNIQUE: Risks and benefits of the procedure were thoroughly explained and oral and written informed consent obtained.  The patient was placed in a sitting position at the edge of the ultrasound gurney and a suitable puncture site designated at an appropriate right posterior intercostal space. A timeout procedure was performed and the patient was prepped and draped in the usual fashion. 2% lidocaine was infiltrated in the subcutis tissues at the designated puncture site. A one-step 5 [place] needle/catheter system was directed into the effusion with prompt return of red-tinged/orange fluid. 300 mL fluid was aspirated with 43 mL sent to laboratory for further analysis. The catheter was then removed and a bandage placed. The patient tolerated the procedure well and no immediate postprocedural complication was identified. Physician performing procedure: Dr Michaelle Estevez Informed consent signed: Yes Local Anesthetic: 2 % Lidocaine Specimen volume: 43 ml Catheter: 19 ga 5 [place] Aspirated pleural fluid volume: 0.3 liters Aspirated pleural fluid color: Old heme tinged Complications: None observed FINDINGS: There are 4 saved ultrasound images. Preprocedure images show moderate right pleural effusion and post procedure images show small residual.      Successful ultrasound-guided right thoracentesis. **This report has been created using voice recognition software. It may contain minor errors which are inherent in voice recognition technology. ** Final report electronically signed by Dr. Karlos Zimmerman MD on 1/28/2022 5:11 PM       Follow-up scheduled after discharge :-    in the next few days with [facility], APRN - CNP  in the next few days with Pulm    Consultations during this hospital stay:-  [] NONE [] Cardiology  [] Nephrology  [] Hemo onco   [] GI   [] ID  [] Endocrine  [x] Pulm    [] Neuro    [] Psych   [] Urology  [] ENT   [] G SURGERY   []Ortho    []CV surg    [] Palliative  [] Hospice [] Pain management   []    []TCU   [] PT/OT  OTHERS:-    Disposition: home  Condition at Discharge: Stable    Time Spent:- 45 minutes    Electronically signed by HIRAM Dewitt on 2/3/22 at 9:48 AM EST   Discharging Hospitalist

## 2022-02-07 ENCOUNTER — APPOINTMENT (OUTPATIENT)
Dept: CT IMAGING | Age: 52
DRG: 137 | End: 2022-02-07
Attending: INTERNAL MEDICINE
Payer: MEDICARE

## 2022-02-07 ENCOUNTER — APPOINTMENT (OUTPATIENT)
Dept: GENERAL RADIOLOGY | Age: 52
DRG: 137 | End: 2022-02-07
Attending: INTERNAL MEDICINE
Payer: MEDICARE

## 2022-02-07 ENCOUNTER — OFFICE VISIT (OUTPATIENT)
Dept: PULMONOLOGY | Age: 52
End: 2022-02-07
Payer: MEDICARE

## 2022-02-07 ENCOUNTER — HOSPITAL ENCOUNTER (INPATIENT)
Age: 52
LOS: 3 days | Discharge: HOME | DRG: 137 | End: 2022-02-10
Attending: INTERNAL MEDICINE | Admitting: INTERNAL MEDICINE
Payer: MEDICARE

## 2022-02-07 VITALS
OXYGEN SATURATION: 92 % | HEART RATE: 101 BPM | SYSTOLIC BLOOD PRESSURE: 138 MMHG | TEMPERATURE: 98.1 F | HEIGHT: 67 IN | BODY MASS INDEX: 20.21 KG/M2 | DIASTOLIC BLOOD PRESSURE: 76 MMHG | WEIGHT: 128.8 LBS

## 2022-02-07 DIAGNOSIS — R93.89 ABNORMAL CHEST X-RAY: ICD-10-CM

## 2022-02-07 DIAGNOSIS — J86.9 EMPYEMA, RIGHT (HCC): Primary | ICD-10-CM

## 2022-02-07 DIAGNOSIS — R91.8 MASS OF LEFT LUNG: ICD-10-CM

## 2022-02-07 DIAGNOSIS — E11.9 TYPE 2 DIABETES MELLITUS WITHOUT COMPLICATION, WITHOUT LONG-TERM CURRENT USE OF INSULIN (HCC): ICD-10-CM

## 2022-02-07 DIAGNOSIS — R91.8 MASS OF UPPER LOBE OF LEFT LUNG: Primary | ICD-10-CM

## 2022-02-07 DIAGNOSIS — J86.9 EMPYEMA (HCC): ICD-10-CM

## 2022-02-07 DIAGNOSIS — Z72.0 TOBACCO ABUSE: ICD-10-CM

## 2022-02-07 LAB
ANION GAP SERPL CALCULATED.3IONS-SCNC: 15 MEQ/L (ref 8–16)
AVERAGE GLUCOSE: 174 MG/DL (ref 70–126)
BUN BLDV-MCNC: 36 MG/DL (ref 7–22)
CALCIUM SERPL-MCNC: 8.9 MG/DL (ref 8.5–10.5)
CHLORIDE BLD-SCNC: 93 MEQ/L (ref 98–111)
CO2: 23 MEQ/L (ref 23–33)
CREAT SERPL-MCNC: 1.7 MG/DL (ref 0.4–1.2)
ERYTHROCYTE [DISTWIDTH] IN BLOOD BY AUTOMATED COUNT: 15.4 % (ref 11.5–14.5)
ERYTHROCYTE [DISTWIDTH] IN BLOOD BY AUTOMATED COUNT: 41.5 FL (ref 35–45)
GFR SERPL CREATININE-BSD FRML MDRD: 52 ML/MIN/1.73M2
GFR SERPL CREATININE-BSD FRML MDRD: 77 ML/MIN/1.73M2
GFR SERPL CREATININE-BSD FRML MDRD: 85 ML/MIN/1.73M2
GFR SERPL CREATININE-BSD FRML MDRD: > 90 ML/MIN/1.73M2
GLUCOSE BLD-MCNC: 306 MG/DL (ref 70–108)
GLUCOSE BLD-MCNC: 472 MG/DL (ref 70–108)
GLUCOSE BLD-MCNC: 99 MG/DL (ref 70–108)
HBA1C MFR BLD: 7.8 % (ref 4.4–6.4)
HCT VFR BLD CALC: 42.6 % (ref 42–52)
HEMOGLOBIN: 13.7 GM/DL (ref 14–18)
INR BLD: 1.12 (ref 0.85–1.13)
LD: 132 U/L (ref 100–190)
MCH RBC QN AUTO: 24.8 PG (ref 26–33)
MCHC RBC AUTO-ENTMCNC: 32.2 GM/DL (ref 32.2–35.5)
MCV RBC AUTO: 77 FL (ref 80–94)
PLATELET # BLD: 746 THOU/MM3 (ref 130–400)
PMV BLD AUTO: 8.7 FL (ref 9.4–12.4)
POTASSIUM REFLEX MAGNESIUM: 4.7 MEQ/L (ref 3.5–5.2)
PROCALCITONIN: 0.09 NG/ML (ref 0.01–0.09)
RBC # BLD: 5.53 MILL/MM3 (ref 4.7–6.1)
SODIUM BLD-SCNC: 131 MEQ/L (ref 135–145)
TOTAL PROTEIN: 7.1 G/DL (ref 6.1–8)
WBC # BLD: 9.7 THOU/MM3 (ref 4.8–10.8)

## 2022-02-07 PROCEDURE — 84155 ASSAY OF PROTEIN SERUM: CPT

## 2022-02-07 PROCEDURE — 82948 REAGENT STRIP/BLOOD GLUCOSE: CPT

## 2022-02-07 PROCEDURE — G8484 FLU IMMUNIZE NO ADMIN: HCPCS | Performed by: INTERNAL MEDICINE

## 2022-02-07 PROCEDURE — 83036 HEMOGLOBIN GLYCOSYLATED A1C: CPT

## 2022-02-07 PROCEDURE — 85610 PROTHROMBIN TIME: CPT

## 2022-02-07 PROCEDURE — 3017F COLORECTAL CA SCREEN DOC REV: CPT | Performed by: INTERNAL MEDICINE

## 2022-02-07 PROCEDURE — 4004F PT TOBACCO SCREEN RCVD TLK: CPT | Performed by: INTERNAL MEDICINE

## 2022-02-07 PROCEDURE — 99204 OFFICE O/P NEW MOD 45 MIN: CPT | Performed by: INTERNAL MEDICINE

## 2022-02-07 PROCEDURE — 6360000002 HC RX W HCPCS: Performed by: INTERNAL MEDICINE

## 2022-02-07 PROCEDURE — 71250 CT THORAX DX C-: CPT

## 2022-02-07 PROCEDURE — 36415 COLL VENOUS BLD VENIPUNCTURE: CPT

## 2022-02-07 PROCEDURE — 1111F DSCHRG MED/CURRENT MED MERGE: CPT | Performed by: INTERNAL MEDICINE

## 2022-02-07 PROCEDURE — 99223 1ST HOSP IP/OBS HIGH 75: CPT | Performed by: INTERNAL MEDICINE

## 2022-02-07 PROCEDURE — G8427 DOCREV CUR MEDS BY ELIG CLIN: HCPCS | Performed by: INTERNAL MEDICINE

## 2022-02-07 PROCEDURE — 1200000000 HC SEMI PRIVATE

## 2022-02-07 PROCEDURE — 85027 COMPLETE CBC AUTOMATED: CPT

## 2022-02-07 PROCEDURE — G8420 CALC BMI NORM PARAMETERS: HCPCS | Performed by: INTERNAL MEDICINE

## 2022-02-07 PROCEDURE — 2580000003 HC RX 258: Performed by: INTERNAL MEDICINE

## 2022-02-07 PROCEDURE — 80048 BASIC METABOLIC PNL TOTAL CA: CPT

## 2022-02-07 PROCEDURE — 83615 LACTATE (LD) (LDH) ENZYME: CPT

## 2022-02-07 PROCEDURE — 71045 X-RAY EXAM CHEST 1 VIEW: CPT

## 2022-02-07 PROCEDURE — 6370000000 HC RX 637 (ALT 250 FOR IP): Performed by: INTERNAL MEDICINE

## 2022-02-07 PROCEDURE — 84145 PROCALCITONIN (PCT): CPT

## 2022-02-07 RX ORDER — CETIRIZINE HYDROCHLORIDE 10 MG/1
10 TABLET ORAL DAILY
Status: DISCONTINUED | OUTPATIENT
Start: 2022-02-08 | End: 2022-02-10 | Stop reason: HOSPADM

## 2022-02-07 RX ORDER — POLYETHYLENE GLYCOL 3350 17 G/17G
17 POWDER, FOR SOLUTION ORAL DAILY PRN
Status: DISCONTINUED | OUTPATIENT
Start: 2022-02-07 | End: 2022-02-10 | Stop reason: HOSPADM

## 2022-02-07 RX ORDER — ACETAMINOPHEN 325 MG/1
650 TABLET ORAL EVERY 6 HOURS PRN
Status: DISCONTINUED | OUTPATIENT
Start: 2022-02-07 | End: 2022-02-10 | Stop reason: HOSPADM

## 2022-02-07 RX ORDER — SODIUM CHLORIDE 9 MG/ML
INJECTION, SOLUTION INTRAVENOUS CONTINUOUS
Status: DISCONTINUED | OUTPATIENT
Start: 2022-02-07 | End: 2022-02-07

## 2022-02-07 RX ORDER — SODIUM CHLORIDE 0.9 % (FLUSH) 0.9 %
5-40 SYRINGE (ML) INJECTION PRN
Status: DISCONTINUED | OUTPATIENT
Start: 2022-02-07 | End: 2022-02-10 | Stop reason: HOSPADM

## 2022-02-07 RX ORDER — SODIUM CHLORIDE 0.9 % (FLUSH) 0.9 %
5-40 SYRINGE (ML) INJECTION EVERY 12 HOURS SCHEDULED
Status: DISCONTINUED | OUTPATIENT
Start: 2022-02-07 | End: 2022-02-10 | Stop reason: HOSPADM

## 2022-02-07 RX ORDER — FLUTICASONE PROPIONATE 50 MCG
1 SPRAY, SUSPENSION (ML) NASAL DAILY PRN
Status: DISCONTINUED | OUTPATIENT
Start: 2022-02-07 | End: 2022-02-10 | Stop reason: HOSPADM

## 2022-02-07 RX ORDER — SODIUM CHLORIDE 9 MG/ML
25 INJECTION, SOLUTION INTRAVENOUS PRN
Status: DISCONTINUED | OUTPATIENT
Start: 2022-02-07 | End: 2022-02-10 | Stop reason: HOSPADM

## 2022-02-07 RX ORDER — ONDANSETRON 2 MG/ML
4 INJECTION INTRAMUSCULAR; INTRAVENOUS EVERY 6 HOURS PRN
Status: DISCONTINUED | OUTPATIENT
Start: 2022-02-07 | End: 2022-02-10 | Stop reason: HOSPADM

## 2022-02-07 RX ORDER — LISINOPRIL 40 MG/1
40 TABLET ORAL DAILY
Status: DISCONTINUED | OUTPATIENT
Start: 2022-02-08 | End: 2022-02-07

## 2022-02-07 RX ORDER — DEXTROSE MONOHYDRATE 50 MG/ML
100 INJECTION, SOLUTION INTRAVENOUS PRN
Status: DISCONTINUED | OUTPATIENT
Start: 2022-02-07 | End: 2022-02-10 | Stop reason: HOSPADM

## 2022-02-07 RX ORDER — ONDANSETRON 4 MG/1
4 TABLET, ORALLY DISINTEGRATING ORAL EVERY 8 HOURS PRN
Status: DISCONTINUED | OUTPATIENT
Start: 2022-02-07 | End: 2022-02-10 | Stop reason: HOSPADM

## 2022-02-07 RX ORDER — NICOTINE POLACRILEX 4 MG
15 LOZENGE BUCCAL PRN
Status: DISCONTINUED | OUTPATIENT
Start: 2022-02-07 | End: 2022-02-10 | Stop reason: HOSPADM

## 2022-02-07 RX ORDER — DEXTROSE MONOHYDRATE 25 G/50ML
12.5 INJECTION, SOLUTION INTRAVENOUS PRN
Status: DISCONTINUED | OUTPATIENT
Start: 2022-02-07 | End: 2022-02-10 | Stop reason: HOSPADM

## 2022-02-07 RX ORDER — ATORVASTATIN CALCIUM 40 MG/1
40 TABLET, FILM COATED ORAL NIGHTLY
Status: DISCONTINUED | OUTPATIENT
Start: 2022-02-07 | End: 2022-02-10 | Stop reason: HOSPADM

## 2022-02-07 RX ORDER — ACETAMINOPHEN 650 MG/1
650 SUPPOSITORY RECTAL EVERY 6 HOURS PRN
Status: DISCONTINUED | OUTPATIENT
Start: 2022-02-07 | End: 2022-02-10 | Stop reason: HOSPADM

## 2022-02-07 RX ORDER — HYDROCHLOROTHIAZIDE 25 MG/1
25 TABLET ORAL DAILY
Status: DISCONTINUED | OUTPATIENT
Start: 2022-02-08 | End: 2022-02-10 | Stop reason: HOSPADM

## 2022-02-07 RX ORDER — DEXAMETHASONE 4 MG/1
6 TABLET ORAL
Status: ON HOLD | COMMUNITY
End: 2022-02-10 | Stop reason: HOSPADM

## 2022-02-07 RX ORDER — AMLODIPINE BESYLATE 10 MG/1
10 TABLET ORAL DAILY
Status: DISCONTINUED | OUTPATIENT
Start: 2022-02-08 | End: 2022-02-10 | Stop reason: HOSPADM

## 2022-02-07 RX ADMIN — CEFAZOLIN 2000 MG: 10 INJECTION, POWDER, FOR SOLUTION INTRAVENOUS at 23:21

## 2022-02-07 RX ADMIN — ATORVASTATIN CALCIUM 40 MG: 40 TABLET, FILM COATED ORAL at 20:29

## 2022-02-07 RX ADMIN — CEFAZOLIN 2000 MG: 10 INJECTION, POWDER, FOR SOLUTION INTRAVENOUS at 16:06

## 2022-02-07 RX ADMIN — SODIUM CHLORIDE, PRESERVATIVE FREE 10 ML: 5 INJECTION INTRAVENOUS at 20:33

## 2022-02-07 RX ADMIN — INSULIN LISPRO 8 UNITS: 100 INJECTION, SOLUTION INTRAVENOUS; SUBCUTANEOUS at 17:19

## 2022-02-07 RX ADMIN — ACETAMINOPHEN 650 MG: 325 TABLET ORAL at 23:20

## 2022-02-07 RX ADMIN — ACETAMINOPHEN 650 MG: 325 TABLET ORAL at 16:15

## 2022-02-07 ASSESSMENT — PAIN SCALES - GENERAL
PAINLEVEL_OUTOF10: 5
PAINLEVEL_OUTOF10: 0
PAINLEVEL_OUTOF10: 0
PAINLEVEL_OUTOF10: 3
PAINLEVEL_OUTOF10: 3
PAINLEVEL_OUTOF10: 0

## 2022-02-07 ASSESSMENT — PAIN DESCRIPTION - ORIENTATION
ORIENTATION: RIGHT
ORIENTATION: RIGHT;LOWER

## 2022-02-07 ASSESSMENT — PAIN DESCRIPTION - PROGRESSION
CLINICAL_PROGRESSION: GRADUALLY WORSENING
CLINICAL_PROGRESSION: NOT CHANGED
CLINICAL_PROGRESSION: GRADUALLY WORSENING

## 2022-02-07 ASSESSMENT — PAIN DESCRIPTION - LOCATION
LOCATION: CHEST
LOCATION: CHEST

## 2022-02-07 ASSESSMENT — PAIN DESCRIPTION - PAIN TYPE
TYPE: ACUTE PAIN
TYPE: ACUTE PAIN

## 2022-02-07 ASSESSMENT — PAIN DESCRIPTION - DESCRIPTORS
DESCRIPTORS: TENDER
DESCRIPTORS: SHARP;TENDER

## 2022-02-07 ASSESSMENT — PAIN DESCRIPTION - FREQUENCY
FREQUENCY: CONTINUOUS
FREQUENCY: CONTINUOUS

## 2022-02-07 ASSESSMENT — PAIN DESCRIPTION - ONSET
ONSET: ON-GOING
ONSET: ON-GOING

## 2022-02-07 ASSESSMENT — PAIN - FUNCTIONAL ASSESSMENT
PAIN_FUNCTIONAL_ASSESSMENT: PREVENTS OR INTERFERES SOME ACTIVE ACTIVITIES AND ADLS
PAIN_FUNCTIONAL_ASSESSMENT: PREVENTS OR INTERFERES SOME ACTIVE ACTIVITIES AND ADLS

## 2022-02-07 NOTE — H&P
Hospitalist History and Physical          Patient: Brittny Bell  : 1970  MRN: [id number]     Acct: [de-identified]    PCP: BARBARA Hastings CNP  Date of Admission: 2022  Date of Service: Pt seen/examined on 22  and Admitted to Inpatient with expected LOS greater than two midnights due to medical therapy. Hospital Problems           Last Modified POA    Empyema [facility]) 2022 Yes          Assessment and Plan:  Right empyema: Patient recently admitted with COVID-19 found to have a right pleural effusion and thoracentesis cultures recently grew out staph hominis. He was in pulmonology clinic when these cultures were discovered and sent in for further management of empyema.  -Consult cardiothoracic surgery for chest tube placement and management  -We will obtain pleural effusion studies including lites criteria, cultures, Gram stain, glucose/pH, repeat cytology given left upper lobe mass.  -Will consult infectious disease to assist with duration and choice of antibiotics    Recent COVID-19 infection: Patient doing well from that regard on room air. Completed Decadron at home. History of hypertension: We will resume home amlodipine, hydrochlorothiazide, lisinopril    History of hyperlipidemia: Continue home statin    History of recent hyperglycemia: Patient was noted be hyperglycemic last hospital stay. Last A1c in 2016 was 5.8. Will provide sliding scale insulin and a carb controlled diet. Possibly that he had steroid-induced hyperglycemia. Left upper lobe nodule/mass, unintentional weight loss: Patient incidentally found to have a 3.2 x 1.5 cm left upper lobe mass on CT imaging of his chest during his last hospitalization. Also reports losing 30 to 40 pounds unintentionally over the last 6 months. He denies any hemoptysis.   -We will need CT-guided biopsy eventually it appears pulmonology is thinking to pursue this outpatient after resolution of treating empyema.  -We will check thyroid studies for complete work-up of weight loss  -Patient's blood sugars are also very high when he was here last time possibly significantly untreated diabetic which could also explain some weight loss. Check hemoglobin A1c          =======================================================================      Chief Complaint:  Sent from clinic    History Of Present Illness: This is a 60-year-old gentleman with a past medical history of hypertension hyperlipidemia and recent COVID-19 infection. He was recently admitted for his COVID-19 infection for where he presented last week with right-sided chest pain. During his stay he was treated with baricitinib and Decadron. He was incidentally found to have a left upper lobe pulmonary mass measuring 3.2 x 1.5 cm. He also had a right pleural effusion and underwent thoracentesis during his stay. Fluid studies at that time did not grow anything on culture, however, while he was following up with pulmonology this week for the left upper lobe mass they reviewed his chart and found that the culture did eventually grow staph hominis. Of note patient was also discharged with Levaquin last admission. He was sent from clinic for further work-up and treatment of empyema. At this time patient feels improved from discharge he is still having some discomfort of in the right side of his chest.  He denies any fevers or chills. He denies any lightheadedness, dizziness, hemoptysis, night sweats, dyspnea on exertion. He states he otherwise feels quite well. Still has a little bit of residual cough from his COVID-19 infection. Denies any diarrhea or dysuria. He endorses losing at least 30 pounds over the last 6 months unintentionally. He states he was told he was a diabetic in the past but that was 2008 and since then has not been on any medications.   He denies any headaches or vision changes      Past Medical History:        Diagnosis Date    GERD (gastroesophageal reflux disease)     Headache(784.0)     Hyperlipidemia     Hypertension        Past Surgical History:        Procedure Laterality Date    MANDIBLE FRACTURE SURGERY         Medications Prior to Admission:   Prior to Admission medications    Medication Sig Start Date End Date Taking? Authorizing Provider   dexamethasone (DECADRON) 4 MG tablet Take 6 mg by mouth daily (with breakfast)   Yes Historical Provider, MD   hydroCHLOROthiazide (HYDRODIURIL) 25 MG tablet Take 1 tablet by mouth daily 2/3/22  Yes HIRAM Marte   amLODIPine (NORVASC) 10 MG tablet Take 1 tablet by mouth daily 2/3/22  Yes HIRAM Maret   levoFLOXacin (LEVAQUIN) 750 MG tablet Take 1 tablet by mouth daily for 5 days 2/2/22 2/7/22 Yes HIRAM Marte   acetaminophen (AMINOFEN) 325 MG tablet Take 2 tablets by mouth every 6 hours as needed for Pain 4/4/21  Yes Seymour Lazaro DO   lisinopril (PRINIVIL;ZESTRIL) 40 MG tablet Take 1 tablet by mouth daily  Patient not taking: Reported on 2/7/2022 2/3/22   HIRAM Chauhan   fluticasone (FLONASE) 50 MCG/ACT nasal spray 1 spray by Each Nare route daily  Patient not taking: Reported on 2/7/2022 1/10/19   Mark Cuello MD   loratadine (CLARITIN) 10 MG tablet Take 1 tablet by mouth daily  Patient not taking: Reported on 2/7/2022 1/10/19   Mark Cuello MD   atorvastatin (LIPITOR) 40 MG tablet take 1 tablet by mouth once daily  Patient not taking: Reported on 2/7/2022 4/28/16   BARBARA Rodarte CNP       Allergies:  Aspirin and Motrin [ibuprofen micronized]    Social History:    The patient currently lives at home  Tobacco use:   reports that he has been smoking cigarettes. He has a 5.60 pack-year smoking history. He has never used smokeless tobacco.  Alcohol use:   reports current alcohol use. Drug use:  reports current drug use. Drug: Marijuana Rondi Baba).      Family History:   as follows:      Problem Relation Age of Onset    Heart Disease Mother     Diabetes Mother     Hypertension Mother        Review of Systems:   Pertinent positives and negatives as noted in the HPI. All other systems reviewed and negative. Physical Exam:    /75   Pulse 96   Temp 97.6 °F (36.4 °C) (Oral)   Resp 20   SpO2 98%       General appearance: No apparent distress, appears to be somewhat malnourished/mildly cachectic  Eyes:  Pupils equal, round, and reactive to light. Conjunctivae/corneas clear. HENT: Head normal in appearance. External nares normal.  Oral mucosa moist without lesions. Hearing grossly intact. Neck: Supple, with full range of motion. Trachea midline. No gross JVD appreciated. Respiratory:  Normal respiratory effort. Clear to auscultation, bilaterally without rales or wheezes or rhonchi. Cardiovascular: Normal rate, regular rhythm with normal S1/S2 without murmurs. No lower extremity edema. Abdomen: Soft, non-tender, non-distended with normal bowel sounds. Musculoskeletal: There is no joint swelling or tenderness. Normal tone. No abnormal movements. Skin: Warm and dry. No rashes or lesions. Neurologic:  No focal sensory/motor deficits in the upper and lower extremities. Cranial nerves:  grossly non-focal 2-12. Psychiatric: Alert and oriented, normal insight and thought content. Capillary Refill: Brisk,< 3 seconds. Peripheral Pulses: +2 palpable, equal bilaterally. Labs:     No results for input(s): WBC, HGB, HCT, PLT in the last 72 hours. No results for input(s): NA, K, CL, CO2, BUN, CREATININE, CALCIUM, PHOS in the last 72 hours. Invalid input(s): MAGNES  No results for input(s): AST, ALT, BILIDIR, BILITOT, ALKPHOS in the last 72 hours. No results for input(s): INR in the last 72 hours. No results for input(s): Bennetta Downy in the last 72 hours.   Lab Results   Component Value Date    NITRU NEGATIVE 09/28/2021    BLOODU NEGATIVE 09/28/2021    GLUCOSEU NEGATIVE 09/28/2021         Radiology:     No orders to display PT/OT Eval Status: no  Diet: No diet orders on file  DVT prophylaxis: SCDs  Code Status: Prior  Disposition: admit to [address] [place] [address]      Thank you [facility], APRN - CNP for the opportunity to be involved in this patient's care.     Electronically signed by Isa Krause MD on 2/7/2022 at 11:42 AM.

## 2022-02-07 NOTE — PROGRESS NOTES
Neck Circumference -   13.5in  Mallampati - 4    Lung Nodule Screening     [] Qualifies    [] Does not qualify   [] Declined    [] Completed

## 2022-02-07 NOTE — PROGRESS NOTES
Pt admitted to  5K14 via via direct admit from direct admit: OFFICE. Complaints: Shortness of breath. . Vital signs obtained. Assessment and data collection initiated. Two nurse skin assessment performed by Kang Osman RN RN and Lilia Rankin RN. Oriented to room. Policies and procedures for  explained. All questions answered with no further questions at this time. Fall prevention and safety brochure discussed with patient. Bed alarm on. Call light in reach. Oriented to room. Siena Guthrie RN, RN 2/7/2022 5:33 PM     Explained patients right to have family, representative or physician notified of their admission. Patient has Declined for physician to be notified. Patient has Declined for family/representative to be notified.

## 2022-02-07 NOTE — PROGRESS NOTES
[facility]  Pulmonary medicine clinic initial consult note. Patient: Estelle Malhotra  : 1970      Chief complaint/[place]: Estelle Malhotra is a 49 y. o.oldmale came for further evaluation regarding his with referral from [facility] hospitalist service for incidental lung mass found on ORLANDO lobe on CTA chest on . The patient had recently been hospitalized for COVID-19 PNA with superimposed bacterial pneumonia and pleural effusion s/p thoracentesis. He states that he feels much better following his discharge on . He states that he still has some lingering chest pain on his R side, and that he has slight dry cough with occasional production of clear sputum. He believes he lost some weight while in the hospital. He denies any fever, chills, wheezing, SOB, congestion, sore throat, hemoptysis, nausea, vomiting, diarrhea, headache, dizziness, or any other symptoms. He denies any history of lung disorders, CV disease, or any use of aspirin or plavix. Social History:  Occupation:  He is current working: No  Type of profession: unemployed. History of tobacco smoking:Yes. Amount of tobacco smokin.25 PPD. Years of tobacco smokin. Quit smoking: No.  .              Quit year:No.    Current smoker: Yes.        Amount of current tobacco smokin.25 PPD    History of recreational or IV drug use in the past: YES (cocaine, marijuana)     History of exposure to coal mines/coal dust: NO  History of exposure to foundry dust/welding: NO  History of exposure to quarry/silica/sandblasting: NO  History of exposure to asbestos/working with breaks/ships: NO  History of exposure to farm dust: NO  History of recent travel to long distances: NO  History of exposure to birds, pigeons, or chickens in the past:NO  Pet animals at home:Yes  Dogs: 2  Cats: 0    History of pulmonary embolism in the past: No            History of DVT in the past:No                             Review of Systems:     General/Constitutional: Reports unspecified weight loss during recent hospitalization. Denies fever, chills  HENT: Denies congestion, sore throat  Eyes: Denies vision changes  Respiratory: Reports mild dry cough with occasional production of white sputum. Denies wheezing, hemoptysis  Cardiovascular: Denies chest pain, SOB  Gastrointestinal: Denies nausea, vomiting, diarrhea. Neurological: Denies headache, dizziness. Extremities: Denies swelling. Musculoskeletal: Denies weakness, pain. Skin: Denies any abnormalities. Psych: Denies any abnormalities. Current Medications:     HCTZ 25 mg daily, Amlodipine 10 mg daily, Levofloxacin 750 mg daily for 5 days      Past Medical History:   Diagnosis Date    GERD (gastroesophageal reflux disease)     Headache(784.0)     Hyperlipidemia     Hypertension        Past Surgical History:   Procedure Laterality Date    MANDIBLE FRACTURE SURGERY         Allergies   Allergen Reactions    Aspirin Hives and Rash    Motrin [Ibuprofen Micronized] Hives and Rash       Current Outpatient Medications   Medication Sig Dispense Refill    hydroCHLOROthiazide (HYDRODIURIL) 25 MG tablet Take 1 tablet by mouth daily 30 tablet 3    amLODIPine (NORVASC) 10 MG tablet Take 1 tablet by mouth daily 30 tablet 3    levoFLOXacin (LEVAQUIN) 750 MG tablet Take 1 tablet by mouth daily for 5 days 5 tablet 0    lisinopril (PRINIVIL;ZESTRIL) 40 MG tablet Take 1 tablet by mouth daily (Patient not taking: Reported on 2/7/2022) 30 tablet 3    acetaminophen (AMINOFEN) 325 MG tablet Take 2 tablets by mouth every 6 hours as needed for Pain (Patient not taking: Reported on 2/7/2022) 120 tablet 3    fluticasone (FLONASE) 50 MCG/ACT nasal spray 1 spray by Each Nare route daily (Patient not taking: Reported on 2/7/2022) 1 Bottle 0    loratadine (CLARITIN) 10 MG tablet Take 1 tablet by mouth daily (Patient not taking: Reported on 2/7/2022) 20 tablet 0    atorvastatin (LIPITOR) 40 MG tablet take 1 tablet by mouth once daily (Patient not taking: Reported on 2/7/2022) 30 tablet 6     No current facility-administered medications for this visit. Family History   Problem Relation Age of Onset    Heart Disease Mother     Diabetes Mother     Hypertension Mother            Physical Exam:    VITALS:  /76 (Site: Right Upper Arm, Position: Sitting, Cuff Size: Medium Adult)   Pulse 101   Temp 98.1 °F (36.7 °C) (Oral)   Ht 5' 7\" (1.702 m)   Wt 128 lb 12.8 oz (58.4 kg)   SpO2 92%   BMI 20.17 kg/m²     Constitutional: Alert and oriented, no apparent distress  HENT: No obvious abnormalities noted. Head: Normocephalic, atraumatic. Right Ear: No abnormalities. Left Ear: No abnormalities. Mouth/Throat: No swelling or erythema noted  Eyes: No vision deficits. Neck: Supple, no JVD. Cardiovascular: RRR, S1 and S2 heard. No abnormal heart sounds. Pulmonary/Chest:  Normal effort, no respiratory distress. Diminished at bases (more prominent at R side). Otherwise, clear to auscultation bilaterally. Abdominal: Soft, nondistended, nontender. Musculoskeletal: No cyanosis, clubbing, edema noted. Extremities: No abnormalities noted. Lymphadenopathy:  No lymphadenopathy. Neurological: CN II-XII intact bilaterally. No focal neurologic deficits. Skin: Clean, dry, healed wound over thoracentesis site on R back. Otherwise no abnormalities noted. Psychiatric: Thought and content appropriate. Mallampati airway Class:IV  Neck Circumference:13.5 Inches    Diagnostic Data:    Latest Lab Values:  · Sodium 141, Potassium 5.5, Chloride 102, BUN 27, Cr 1.2, glucose 207. · CRP 1.93  · WBC 18.4, Hgb 13.5, platelet 751  · D-Dimer 1883  · Body Fluid Culture from Thoracentesis - Positive for Staphylococcus hominis. Radiological Data:    2/1/22 XR Chest 2 View:  1.  Moderate right pleural effusion which is similar to the prior exam.   2. Right basilar opacities which may represent atelectasis or infiltrate. The appearance has somewhat improved since the previous study. 1/28/22 XR Chest 1 View: (post thoracentesis)  Decreased right pleural effusion without evidence of pneumothorax status post right thoracentesis. 1/27 CTA Chest:  There is a large somewhat loculated appearing pleural effusion on the right side. There are adjacent areas of consolidation which may represent a combination of pneumonia and atelectasis. 2. There is a left upper lobe pulmonary mass. 3. Emphysematous changes. Pulmonary function tests:  N/A    Assessment:  - R Empyema growing Staph hominis s/p thoracentesis  - 3.2 cm x 1.5 cm lung nodule found incidentally on CTA 1/27  - Recent COVID-19 infection with superimposed bacterial pneumonia, discharged by HIRAM Willard on 2/2/22  - Current smoker (~0.25 PPD, 40 year history)  - No Hx of previous lung disease  - No Hx of concurrent CV disease    Recommendations/Plan:  - Patient will need to be readmitted to the hospital for treatment of his R empyema  - IV antibiotics - recommend ID consult  - CT surgery evaluation, drainage, and chest tube placement  - After admission and empyema is addressed, will perform CT guided biopsy for further evaluation of OR[place]O lung nodule  - Will follow-up with inpatient pulmonary consult  - Patient to be sent from this office to the first floor - Main Radiology, where he will be admitted. The patient, as well as related assessment and plan, was discussed with HIRAM Ramos, who was the admitting provider for the inpatient hospitalist service for [address] [place] [address]. She confirmed that the hospitalist team will be admitting the patient and gave the instructions to send the patient to Main Radiology. The patient was seen and discussed with Dr. France Fried. Electronically signed by More Garcia DO    Addendum by Dr. France Fried MD:  I have seen and examined the patient independently.  Face to face evaluation and examination was performed. The above evaluation and note has been reviewed. Labs and radiographs were reviewed. I have discussed with Elham Patel DO about this patient in detail. The above assessment and plan has been reviewed. Please see my modifications mentioned below. My modifications:  Patient was admitted and discharged from [address] [place] [address] recently by . Juan Lee PA-C from hospital service. Thoracentesis performed on right side during his hospitalization on 28 January 2022 by interventional radiology service. Thoracentesis fluid analysis results. Performed on: 28 January 2022  Side: right side of chest .  By IR: Yes  Amount of pleural fluid drained: 0.3 L    Lab Results   Component Value Date    PHFL 7.47 01/28/2022    COLORU YELLOW 09/28/2021    LDFL 958 01/28/2022    PROTEINFL 4.0 01/28/2022    TRIG 56 02/17/2015    TRIG 56 02/17/2015     Lab Results   Component Value Date    PROT 7.7 01/27/2022     CYTOLOGY: No malignant cells. Mesothelial cells and mixed inflammation. Serum LDH: Not Sent  LDH ratio i.e Pleural fluid LDH/ Serum LDH: 958/ Not available. Total protein ratio i.e Pleural fluid Total protein/ Serum Total protein: 4.0/7.7=0.51    Pleural fluid cultures: Staphylococcus hominis ssp. hominis Abnormal     Plan:  As above. - Flora Owen educated about my impression and plan. He verbalizes understanding.     Clover Martin MD 2/7/2022 10:03 AM

## 2022-02-08 LAB
ANION GAP SERPL CALCULATED.3IONS-SCNC: 11 MEQ/L (ref 8–16)
BUN BLDV-MCNC: 36 MG/DL (ref 7–22)
CALCIUM SERPL-MCNC: 8.6 MG/DL (ref 8.5–10.5)
CHLORIDE BLD-SCNC: 95 MEQ/L (ref 98–111)
CO2: 26 MEQ/L (ref 23–33)
CREAT SERPL-MCNC: 1.5 MG/DL (ref 0.4–1.2)
ERYTHROCYTE [DISTWIDTH] IN BLOOD BY AUTOMATED COUNT: 15.2 % (ref 11.5–14.5)
ERYTHROCYTE [DISTWIDTH] IN BLOOD BY AUTOMATED COUNT: 41.6 FL (ref 35–45)
GFR SERPL CREATININE-BSD FRML MDRD: 60 ML/MIN/1.73M2
GLUCOSE BLD-MCNC: 195 MG/DL (ref 70–108)
GLUCOSE BLD-MCNC: 327 MG/DL (ref 70–108)
GLUCOSE BLD-MCNC: 362 MG/DL (ref 70–108)
GLUCOSE BLD-MCNC: 485 MG/DL (ref 70–108)
HCT VFR BLD CALC: 39.6 % (ref 42–52)
HEMOGLOBIN: 12.6 GM/DL (ref 14–18)
INR BLD: 1.02 (ref 0.85–1.13)
MAGNESIUM: 2.3 MG/DL (ref 1.6–2.4)
MCH RBC QN AUTO: 24.3 PG (ref 26–33)
MCHC RBC AUTO-ENTMCNC: 31.8 GM/DL (ref 32.2–35.5)
MCV RBC AUTO: 76.3 FL (ref 80–94)
PLATELET # BLD: 638 THOU/MM3 (ref 130–400)
PMV BLD AUTO: 8.7 FL (ref 9.4–12.4)
POTASSIUM REFLEX MAGNESIUM: 4.4 MEQ/L (ref 3.5–5.2)
PROCALCITONIN: 0.07 NG/ML (ref 0.01–0.09)
RBC # BLD: 5.19 MILL/MM3 (ref 4.7–6.1)
SODIUM BLD-SCNC: 132 MEQ/L (ref 135–145)
WBC # BLD: 9.7 THOU/MM3 (ref 4.8–10.8)

## 2022-02-08 PROCEDURE — 80048 BASIC METABOLIC PNL TOTAL CA: CPT

## 2022-02-08 PROCEDURE — 82948 REAGENT STRIP/BLOOD GLUCOSE: CPT

## 2022-02-08 PROCEDURE — 84145 PROCALCITONIN (PCT): CPT

## 2022-02-08 PROCEDURE — 99254 IP/OBS CNSLTJ NEW/EST MOD 60: CPT | Performed by: INTERNAL MEDICINE

## 2022-02-08 PROCEDURE — 36415 COLL VENOUS BLD VENIPUNCTURE: CPT

## 2022-02-08 PROCEDURE — 1200000000 HC SEMI PRIVATE

## 2022-02-08 PROCEDURE — 83735 ASSAY OF MAGNESIUM: CPT

## 2022-02-08 PROCEDURE — 85027 COMPLETE CBC AUTOMATED: CPT

## 2022-02-08 PROCEDURE — 6370000000 HC RX 637 (ALT 250 FOR IP): Performed by: INTERNAL MEDICINE

## 2022-02-08 PROCEDURE — 6370000000 HC RX 637 (ALT 250 FOR IP): Performed by: STUDENT IN AN ORGANIZED HEALTH CARE EDUCATION/TRAINING PROGRAM

## 2022-02-08 PROCEDURE — 99233 SBSQ HOSP IP/OBS HIGH 50: CPT | Performed by: INTERNAL MEDICINE

## 2022-02-08 PROCEDURE — 6360000002 HC RX W HCPCS: Performed by: INTERNAL MEDICINE

## 2022-02-08 PROCEDURE — 2580000003 HC RX 258: Performed by: INTERNAL MEDICINE

## 2022-02-08 PROCEDURE — 85610 PROTHROMBIN TIME: CPT

## 2022-02-08 PROCEDURE — 6370000000 HC RX 637 (ALT 250 FOR IP): Performed by: PHYSICIAN ASSISTANT

## 2022-02-08 RX ORDER — OXYCODONE HYDROCHLORIDE AND ACETAMINOPHEN 5; 325 MG/1; MG/1
1 TABLET ORAL ONCE
Status: COMPLETED | OUTPATIENT
Start: 2022-02-08 | End: 2022-02-08

## 2022-02-08 RX ORDER — INSULIN GLARGINE 100 [IU]/ML
5 INJECTION, SOLUTION SUBCUTANEOUS NIGHTLY
Status: DISCONTINUED | OUTPATIENT
Start: 2022-02-08 | End: 2022-02-10 | Stop reason: HOSPADM

## 2022-02-08 RX ORDER — OXYCODONE HYDROCHLORIDE AND ACETAMINOPHEN 5; 325 MG/1; MG/1
1 TABLET ORAL EVERY 4 HOURS PRN
Status: DISCONTINUED | OUTPATIENT
Start: 2022-02-08 | End: 2022-02-08

## 2022-02-08 RX ORDER — SODIUM CHLORIDE, SODIUM LACTATE, POTASSIUM CHLORIDE, CALCIUM CHLORIDE 600; 310; 30; 20 MG/100ML; MG/100ML; MG/100ML; MG/100ML
INJECTION, SOLUTION INTRAVENOUS CONTINUOUS
Status: DISCONTINUED | OUTPATIENT
Start: 2022-02-08 | End: 2022-02-10

## 2022-02-08 RX ADMIN — INSULIN LISPRO 6 UNITS: 100 INJECTION, SOLUTION INTRAVENOUS; SUBCUTANEOUS at 21:07

## 2022-02-08 RX ADMIN — SODIUM CHLORIDE, POTASSIUM CHLORIDE, SODIUM LACTATE AND CALCIUM CHLORIDE: 600; 310; 30; 20 INJECTION, SOLUTION INTRAVENOUS at 12:18

## 2022-02-08 RX ADMIN — CEFAZOLIN 2000 MG: 10 INJECTION, POWDER, FOR SOLUTION INTRAVENOUS at 16:05

## 2022-02-08 RX ADMIN — HYDROCHLOROTHIAZIDE 25 MG: 25 TABLET ORAL at 08:08

## 2022-02-08 RX ADMIN — ATORVASTATIN CALCIUM 40 MG: 40 TABLET, FILM COATED ORAL at 21:07

## 2022-02-08 RX ADMIN — CEFAZOLIN 2000 MG: 10 INJECTION, POWDER, FOR SOLUTION INTRAVENOUS at 23:30

## 2022-02-08 RX ADMIN — OXYCODONE AND ACETAMINOPHEN 1 TABLET: 5; 325 TABLET ORAL at 10:52

## 2022-02-08 RX ADMIN — OXYCODONE AND ACETAMINOPHEN 1 TABLET: 5; 325 TABLET ORAL at 22:35

## 2022-02-08 RX ADMIN — ACETAMINOPHEN 650 MG: 325 TABLET ORAL at 06:19

## 2022-02-08 RX ADMIN — CEFAZOLIN 2000 MG: 10 INJECTION, POWDER, FOR SOLUTION INTRAVENOUS at 06:14

## 2022-02-08 RX ADMIN — SODIUM CHLORIDE, PRESERVATIVE FREE 10 ML: 5 INJECTION INTRAVENOUS at 08:08

## 2022-02-08 RX ADMIN — ACETAMINOPHEN 650 MG: 325 TABLET ORAL at 18:48

## 2022-02-08 RX ADMIN — INSULIN LISPRO 8 UNITS: 100 INJECTION, SOLUTION INTRAVENOUS; SUBCUTANEOUS at 08:16

## 2022-02-08 RX ADMIN — AMLODIPINE BESYLATE 10 MG: 10 TABLET ORAL at 08:08

## 2022-02-08 RX ADMIN — CETIRIZINE HYDROCHLORIDE 10 MG: 10 TABLET, FILM COATED ORAL at 08:08

## 2022-02-08 ASSESSMENT — PAIN DESCRIPTION - FREQUENCY: FREQUENCY: CONTINUOUS

## 2022-02-08 ASSESSMENT — PAIN DESCRIPTION - PAIN TYPE
TYPE: ACUTE PAIN
TYPE: ACUTE PAIN

## 2022-02-08 ASSESSMENT — PAIN DESCRIPTION - PROGRESSION
CLINICAL_PROGRESSION: GRADUALLY WORSENING
CLINICAL_PROGRESSION: NOT CHANGED
CLINICAL_PROGRESSION: NOT CHANGED
CLINICAL_PROGRESSION: GRADUALLY WORSENING
CLINICAL_PROGRESSION: NOT CHANGED
CLINICAL_PROGRESSION: GRADUALLY WORSENING
CLINICAL_PROGRESSION: GRADUALLY WORSENING
CLINICAL_PROGRESSION: NOT CHANGED
CLINICAL_PROGRESSION: GRADUALLY WORSENING
CLINICAL_PROGRESSION: GRADUALLY WORSENING
CLINICAL_PROGRESSION: NOT CHANGED
CLINICAL_PROGRESSION: NOT CHANGED
CLINICAL_PROGRESSION: GRADUALLY WORSENING

## 2022-02-08 ASSESSMENT — PAIN DESCRIPTION - ORIENTATION
ORIENTATION: RIGHT
ORIENTATION: RIGHT

## 2022-02-08 ASSESSMENT — PAIN DESCRIPTION - ONSET: ONSET: ON-GOING

## 2022-02-08 ASSESSMENT — PAIN DESCRIPTION - DESCRIPTORS
DESCRIPTORS: SHARP
DESCRIPTORS: SHARP

## 2022-02-08 ASSESSMENT — PAIN SCALES - GENERAL
PAINLEVEL_OUTOF10: 3
PAINLEVEL_OUTOF10: 7
PAINLEVEL_OUTOF10: 3
PAINLEVEL_OUTOF10: 8
PAINLEVEL_OUTOF10: 0
PAINLEVEL_OUTOF10: 6

## 2022-02-08 ASSESSMENT — PAIN DESCRIPTION - LOCATION
LOCATION: CHEST
LOCATION: CHEST

## 2022-02-08 ASSESSMENT — PAIN - FUNCTIONAL ASSESSMENT: PAIN_FUNCTIONAL_ASSESSMENT: ACTIVITIES ARE NOT PREVENTED

## 2022-02-08 NOTE — PROGRESS NOTES
Hospitalist progress note          Patient: Brittny Bell  : 1970  MRN: [id number]     Acct: [de-identified]    PCP: BARBARA Hastings CNP  Date of Admission: 2022  Date of Service: Pt seen/examined on 22        Hospital Problems           Last Modified POA    Empyema [facility]) 2022 Yes          Assessment and Plan:  Right empyema, resolving: Patient recently admitted with COVID-19 found to have a right pleural effusion and thoracentesis cultures recently grew out staph hominis. He was in pulmonology clinic when these cultures were discovered and sent in for further management of empyema.  -Right pleural effusion is completely resolved per CT report. We will continue antibiotics with guidance of infectious disease    Left upper lobe nodule/mass, unintentional weight loss, now cavitary lesion: New CT imaging demonstrates new cavitation of lesion that was previously described in January of this year. Concern for possible staph pneumonia, tuberculosis, malignancy. As mentioned previously patient has had at least a 30 pound weight loss over the last 6 months unintentionally. He denies any night sweats. He denies any hemoptysis or significant cough. Patient was previously in a detention approximately 10 years ago.  -Airborne precautions  -We will work with infectious disease and pulmonology in ruling out TB  -Patient eventually will need biopsy of the area to assess for malignancy    DONALD: Patient had mild elevation his creatinine last time he was here. On admission his creatinine was elevated 1.7 and improved to 1.5. Suspect patient is significantly hyperglycemic at home. He does not have a glucometer at home. We have had sugars as high as 362 here. Suspect he is dehydrated from hyperglycemia and also whenever metabolic process is occurring that is causing his unintentional weight loss which also could be hyperglycemia. Also consider malignancy/tuberculosis as mentioned above.   -IV fluids today and continue to hold lisinopril also hold hydrochlorothiazide  -Recheck labs in the morning    Recent COVID-19 infection: Patient doing well from that regard on room air. Completed Decadron at home. History of hypertension: We will resume home amlodipine, hydrochlorothiazide. Lisinopril held for mild DONALD. History of hyperlipidemia: Continue home statin    History of recent hyperglycemia: Patient was noted be hyperglycemic last hospital stay. Last A1c in 2016 was 5.8. Will provide sliding scale insulin and a carb controlled diet. Possibly that he had steroid-induced hyperglycemia. -Update. His A1c is 7.8. Unclear how much affect is due to the recent steroids. We will likely start patient on Metformin on discharge and encouraged him to follow-up with his primary care physician. We will also provide him with glucometer and lancets and test strips              =======================================================================      Chief Complaint:  Sent from clinic    Hospital course: This is a 81-year-old gentleman with a past medical history of hypertension hyperlipidemia and recent COVID-19 infection. He was recently admitted for his COVID-19 infection for where he presented last week with right-sided chest pain. During his stay he was treated with baricitinib and Decadron. He was incidentally found to have a left upper lobe pulmonary mass measuring 3.2 x 1.5 cm. He also had a right pleural effusion and underwent thoracentesis during his stay. Fluid studies at that time did not grow anything on culture, however, while he was following up with pulmonology this week for the left upper lobe mass they reviewed his chart and found that the culture did eventually grow staph hominis. Of note patient was also discharged with Levaquin last admission. He was sent from clinic for further work-up and treatment of empyema.   At this time patient feels improved from discharge he is still having some discomfort of in the right side of his chest.  He denies any fevers or chills. He denies any lightheadedness, dizziness, hemoptysis, night sweats, dyspnea on exertion. He states he otherwise feels quite well. Still has a little bit of residual cough from his COVID-19 infection. Denies any diarrhea or dysuria. He endorses losing at least 30 pounds over the last 6 months unintentionally. He states he was told he was a diabetic in the past but that was 2008 and since then has not been on any medications. He denies any headaches or vision changes    Subjective: Patient doing well. Pleural effusion is resolved, however, his left upper lobe nodule now demonstrates a cavitary aspect. Patient is now being worked up for tuberculosis/malignancy. No other complaints this a.m. besides cramping he denies any nausea vomiting. He states he drinks plenty of water but also endorses polyuria. Past Medical History:        Diagnosis Date    GERD (gastroesophageal reflux disease)     Headache(784.0)     Hyperlipidemia     Hypertension        Past Surgical History:        Procedure Laterality Date    MANDIBLE FRACTURE SURGERY         Medications Prior to Admission:   Prior to Admission medications    Medication Sig Start Date End Date Taking?  Authorizing Provider   dexamethasone (DECADRON) 4 MG tablet Take 6 mg by mouth daily (with breakfast)   Yes Historical Provider, MD   hydroCHLOROthiazide (HYDRODIURIL) 25 MG tablet Take 1 tablet by mouth daily 2/3/22  Yes HIRAM Marte   amLODIPine (NORVASC) 10 MG tablet Take 1 tablet by mouth daily 2/3/22  Yes HIRAM Marte   acetaminophen (AMINOFEN) 325 MG tablet Take 2 tablets by mouth every 6 hours as needed for Pain 4/4/21  Yes Seymour Lazaro,    lisinopril (PRINIVIL;ZESTRIL) 40 MG tablet Take 1 tablet by mouth daily  Patient not taking: Reported on 2/7/2022 2/3/22   HIRAM Marte   fluticasone (FLONASE) 50 MCG/ACT nasal spray 1 spray by Each Nare route daily  Patient not taking: Reported on 2/7/2022 1/10/19   Saulo Briones MD   loratadine (CLARITIN) 10 MG tablet Take 1 tablet by mouth daily  Patient not taking: Reported on 2/7/2022 1/10/19   Saulo Briones MD   atorvastatin (LIPITOR) 40 MG tablet take 1 tablet by mouth once daily  Patient not taking: Reported on 2/7/2022 4/28/16   Daniel Rangel APRN - CNP       Allergies:  Aspirin and Motrin [ibuprofen micronized]    Social History:    The patient currently lives at home  Tobacco use:   reports that he has been smoking cigarettes. He has a 5.60 pack-year smoking history. He has never used smokeless tobacco.  Alcohol use:   reports current alcohol use. Drug use:  reports current drug use. Drug: Marijuana Alba Bachelor). Family History:   as follows:      Problem Relation Age of Onset    Heart Disease Mother     Diabetes Mother     Hypertension Mother        Review of Systems:   Pertinent positives and negatives as noted in the HPI. All other systems reviewed and negative. Physical Exam:    BP (!) 151/76   Pulse 79   Temp 98 °F (36.7 °C) (Oral)   Resp 16   SpO2 99%       General appearance: No apparent distress, appears to be somewhat malnourished/mildly cachectic  Eyes:  Pupils equal, round, and reactive to light. Conjunctivae/corneas clear. HENT: Head normal in appearance. External nares normal.  Oral mucosa moist without lesions. Hearing grossly intact. Neck: Supple, with full range of motion. Trachea midline. No gross JVD appreciated. Respiratory:  Normal respiratory effort. Clear to auscultation, bilaterally without rales or wheezes or rhonchi. Cardiovascular: Normal rate, regular rhythm with normal S1/S2 without murmurs. No lower extremity edema. Abdomen: Soft, non-tender, non-distended with normal bowel sounds. Musculoskeletal: There is no joint swelling or tenderness. Normal tone. No abnormal movements. Skin: Warm and dry. No rashes or lesions.   Neurologic:  No focal sensory/motor deficits in the upper and lower extremities. Cranial nerves:  grossly non-focal 2-12. Psychiatric: Alert and oriented, normal insight and thought content. Capillary Refill: Brisk,< 3 seconds. Peripheral Pulses: +2 palpable, equal bilaterally. Labs:     Recent Labs     02/07/22 1215 02/08/22  0356   WBC 9.7 9.7   HGB 13.7* 12.6*   HCT 42.6 39.6*   * 638*     Recent Labs     02/07/22 1215 02/08/22  0356   * 132*   K 4.7 4.4   CL 93* 95*   CO2 23 26   BUN 36* 36*   CREATININE 1.7* 1.5*   CALCIUM 8.9 8.6     No results for input(s): AST, ALT, BILIDIR, BILITOT, ALKPHOS in the last 72 hours. Recent Labs     02/07/22 1215 02/08/22 0356   INR 1.12 1.02     No results for input(s): Erle Keepers in the last 72 hours. Lab Results   Component Value Date    NITRU NEGATIVE 09/28/2021    BLOODU NEGATIVE 09/28/2021    GLUCOSEU NEGATIVE 09/28/2021         Radiology:     CT CHEST WO CONTRAST   Final Result   1. Interval resolution of the right pleural effusion. 2. A 1.5 x 0.9 cm cavitary pulmonary nodule is seen in the left upper lobe replacing the left upper lobe masslike opacity seen on the prior exam. This is still worrisome for malignancy. 3. Other pulmonary nodules measuring 6 mm or less are seen as described above. 4. Other findings as described above. **This report has been created using voice recognition software. It may contain minor errors which are inherent in voice recognition technology. **      Final report electronically signed by Dr Julee Monroe on 2/7/2022 3:59 PM      XR CHEST PORTABLE   Final Result   The right pleural effusion has decreased in volume. Improved aeration is noted of the right lung base. No pneumothorax is observed. **This report has been created using voice recognition software. It may contain minor errors which are inherent in voice recognition technology. **      Final report electronically signed by Dr Allison Doll Andi Newton on 2/7/2022 1:02 PM      CT NEEDLE BIOPSY LUNG PERCUTANEOUS    (Results Pending)            PT/OT Eval Status: no  Diet: Diet NPO  DVT prophylaxis: SCDs  Code Status: Full Code  Disposition: admit to [address] [place] [address]      Thank you ANGEL LUIS [facility], APRN - CNP for the opportunity to be involved in this patient's care.     Electronically signed by Ingrid Romero MD on 2/8/2022 at 11:46 AM.

## 2022-02-08 NOTE — PLAN OF CARE
Problem: SAFETY  Goal: Free from accidental physical injury  Outcome: Ongoing  Patient remains free from falls this shift. Fall risk assessment complete. Fall sign posted. Non skid socks on. Bed in lowest position, 2/4 siderails up. Bed alarm on. Call light and all possessions within reach. Ambulates appropriately. Rounding hourly. Problem: PAIN  Goal: Patient's pain/discomfort is manageable  Outcome: Ongoing   Patient rates pain in right chest 3/10. Described as tender and discomfort. Patient satisfied with PRN Tylenol. Resting comfortably. Problem: Skin Integrity:  Goal: Will show no infection signs and symptoms  Description: Will show no infection signs and symptoms  Outcome: Ongoing   No fevers this shift. VSS. IV Ancef infusing without complication. Care plan reviewed with patient. Patient verbalizes understanding of the plan of care and contribute to goal setting.

## 2022-02-08 NOTE — PROGRESS NOTES
[facility]      Patient - Shayna Porter   MRN -  [id number]   Acct # - [de-identified]   - 1970      Date of Admission -  2022 10:33 AM  Date of evaluation -  2022  Room - [address], [place] Primary Care Physician - ANGEL LUIS [facility], APRN - CNP     Problem List      Active Hospital Problems    Diagnosis Date Noted    Empyema [facility]) [J86.9] 2022     Reason for Consult    Right Empyema  HPI   History Obtained From: Patient and electronic medical record. Chief complaint/[place]: Shayna Porter is a 49 y. o.old male came for further evaluation regarding his with referral from [facility] hospitalist service for incidental lung mass found on [place] lobe on CTA chest on . The patient had recently been hospitalized for COVID-19 PNA with superimposed bacterial pneumonia and pleural effusion s/p thoracentesis. He states that he feels much better following his discharge on . He states that he still has some lingering chest pain on his R side, and that he has slight dry cough with occasional production of clear sputum. He believes he lost some weight while in the hospital. He denies any fever, chills, wheezing, SOB, congestion, sore throat, hemoptysis, nausea, vomiting, diarrhea, headache, dizziness, or any other symptoms.     He denies any history of lung disorders, CV disease, or any use of aspirin or plavix. He was seen in our clinic for follow-up on 2022 and we discussed readmission to the hospital for further management of his staph hominis cultures and subsequent empyema. Patient admitted for IV antibiotics and potential chest tube placement for recurrent right-sided pleural effusion.       Subjective/Events Past 24 hours/ROS   No acute events overnight  Afebrile  Cefazolin for Staph Hominis isolated from previous pleural fluid culture 2022, ID following  Bbronchoscopy tomorrow to further assess cavitary lesion   He is anxious to go home, but understands he needs this testing  Resolution of right pleural effusion, CT surgery not planning chest tube placement at this time  No other questions or complaints    PMHx   Past Medical History      Diagnosis Date    GERD (gastroesophageal reflux disease)     Headache(784.0)     Hyperlipidemia     Hypertension       Past Surgical History        Procedure Laterality Date    MANDIBLE FRACTURE SURGERY       Meds    Current Medications    oxyCODONE-acetaminophen  1 tablet Oral Once    amLODIPine  10 mg Oral Daily    atorvastatin  40 mg Oral Nightly    hydroCHLOROthiazide  25 mg Oral Daily    cetirizine  10 mg Oral Daily    sodium chloride flush  5-40 mL IntraVENous 2 times per day    insulin lispro  0-12 Units SubCUTAneous TID WC    insulin lispro  0-6 Units SubCUTAneous Nightly    ceFAZolin  2,000 mg IntraVENous Q8H     fluticasone, sodium chloride flush, sodium chloride, ondansetron **OR** ondansetron, polyethylene glycol, acetaminophen **OR** acetaminophen, glucose, dextrose, glucagon (rDNA), dextrose  IV Drips/Infusions   sodium chloride      dextrose       Home Medications  Medications Prior to Admission: dexamethasone (DECADRON) 4 MG tablet, Take 6 mg by mouth daily (with breakfast)  hydroCHLOROthiazide (HYDRODIURIL) 25 MG tablet, Take 1 tablet by mouth daily  amLODIPine (NORVASC) 10 MG tablet, Take 1 tablet by mouth daily  [] levoFLOXacin (LEVAQUIN) 750 MG tablet, Take 1 tablet by mouth daily for 5 days  acetaminophen (AMINOFEN) 325 MG tablet, Take 2 tablets by mouth every 6 hours as needed for Pain  lisinopril (PRINIVIL;ZESTRIL) 40 MG tablet, Take 1 tablet by mouth daily (Patient not taking: Reported on 2022)  fluticasone (FLONASE) 50 MCG/ACT nasal spray, 1 spray by Each Nare route daily (Patient not taking: Reported on 2022)  loratadine (CLARITIN) 10 MG tablet, Take 1 tablet by mouth daily (Patient not taking: Reported on 2/7/2022)  atorvastatin (LIPITOR) 40 MG tablet, take 1 tablet by mouth once daily (Patient not taking: Reported on 2/7/2022)  Diet    Diet NPO  Allergies    Aspirin and Motrin [ibuprofen micronized]  Social History     Social History     Socioeconomic History    Marital status: Single     Spouse name: Not on file    Number of children: Not on file    Years of education: Not on file    Highest education level: Not on file   Occupational History    Not on file   Tobacco Use    Smoking status: Current Every Day Smoker     Packs/day: 0.20     Years: 28.00     Pack years: 5.60     Types: Cigarettes    Smokeless tobacco: Never Used   Vaping Use    Vaping Use: Never used   Substance and Sexual Activity    Alcohol use: Yes     Alcohol/week: 0.0 standard drinks     Comment: Spends $39. a day on beer (49 ozs) pt states he quit a month ago, pt denies     Drug use: Yes     Types: Marijuana (Weed)     Comment:  pt denies quit a month ago     Sexual activity: Not on file   Other Topics Concern    Not on file   Social History Narrative    Not on file     Social Determinants of Health     Financial Resource Strain:     Difficulty of Paying Living Expenses: Not on file   Food Insecurity:     Worried About Running Out of Food in the Last Year: Not on file    Darius of Food in the Last Year: Not on file   Transportation Needs:     Lack of Transportation (Medical): Not on file    Lack of Transportation (Non-Medical):  Not on file   Physical Activity:     Days of Exercise per Week: Not on file    Minutes of Exercise per Session: Not on file   Stress:     Feeling of Stress : Not on file   Social Connections:     Frequency of Communication with Friends and Family: Not on file    Frequency of Social Gatherings with Friends and Family: Not on file    Attends Baptism Services: Not on file    Active Member of Clubs or Organizations: Not on file    Attends Club or Organization Meetings: Not on file    Marital Status: Not on file   Intimate Partner Violence:     Fear of Current or Ex-Partner: Not on file    Emotionally Abused: Not on file    Physically Abused: Not on file    Sexually Abused: Not on file   Housing Stability:     Unable to Pay for Housing in the Last Year: Not on file    Number of [place] in the Last Year: Not on file    Unstable Housing in the Last Year: Not on file     Family History          Problem Relation Age of Onset    Heart Disease Mother     Diabetes Mother     Hypertension Mother      Sleep History    Never diagnosed with sleep apnea in the past.  Occupational history   Occupation:  He is current working: No  Type of profession: unemployed. History of tobacco smoking:Yes  Amount of tobacco smokin.25 PPD. Years of tobacco smokin. Quit smoking: No.              Current smoker: Yes. Amount of current tobacco smokin.25 PPD  . History of recreational or IV drug use in the past:NO     History of exposure to coal mines/coal dust: NO  History of exposure to foundry dust/welding: NO  History of exposure to quarry/silica/sandblasting: NO  History of exposure to asbestos/working with breaks/ships: NO  History of exposure to farm dust: NO  History of recent travel to long distances: NO  History of exposure to birds, pigeons, or chickens in the past:NO  Pet animals at home:Yes  Dogs: 2    History of pulmonary embolism in the past: No            History of DVT in the past:No        [] Right lower extremity   [] Left lower extremity. Review of systems   Constitutional: Negative for chills and fever. Wants to be discharged so he can see his pets. HENT: Negative for congestion and sore throat. Eyes: Negative for visual disturbance. Respiratory: Negative for cough and shortness of breath. No sputum production. Cardiovascular: Negative for chest pain.    Gastrointestinal: Negative for abdominal pain and nausea. Genitourinary: Negative for dysuria. Skin: Negative for rash. Neurological: Negative for dizziness, light-headedness and headaches. Psychiatric/Behavioral: The patient is not nervous/anxious. Vitals     oral temperature is 98 °F (36.7 °C). His blood pressure is 151/76 (abnormal) and his pulse is 79. His respiration is 16 and oxygen saturation is 99%. There is no height or weight on file to calculate BMI. SUPPLEMENTAL O2:       I/O        Intake/Output Summary (Last 24 hours) at 2/8/2022 1008  Last data filed at 2/8/2022 0808  Gross per 24 hour   Intake 1939.21 ml   Output 300 ml   Net 1639.21 ml     I/O last 3 completed shifts: In: 1929.2 [P.O.:1880; IV Piggyback:49.2]  Out: 300 [Urine:300]   No data found. Exam   Nursing note and vitals reviewed. Constitutional: No acute distress. Breathing comfortably on room air. Mouth/throat: Oropharynx is clear and moist  Neck: Neck supple, no JVD noted. Cardiovascular: Normal S1 and S2, normal rate. No murmurs. Pulmonary/chest: Breath sounds auscultated bilaterally. No wheezes, rhonchi, or rales. Abdominal: Soft, ND/NT. Extremities: No edema noted. Neurological: Awake and alert, responding appropriately to questions. Mallampati airway class IV  Neck circumference 13.5 inches    Labs  - Old records and notes have been reviewed in [facility]   ABG  No results found for: PH, PO2, PCO2, HCO3, O2SAT  No results found for: IFIO2, MODE, SETTIDVOL, SETPEEP  CBC  Recent Labs     02/07/22  1215 02/08/22  0356   WBC 9.7 9.7   RBC 5.53 5.19   HGB 13.7* 12.6*   HCT 42.6 39.6*   MCV 77.0* 76.3*   MCH 24.8* 24.3*   MCHC 32.2 31.8*   * 638*   MPV 8.7* 8.7*      BMP  Recent Labs     02/07/22  1215 02/08/22  0356   * 132*   K 4.7 4.4   CL 93* 95*   CO2 23 26   BUN 36* 36*   CREATININE 1.7* 1.5*   GLUCOSE 472* 362*   CALCIUM 8.9 8.6     LFT  No results for input(s): AST, ALT, ALB, BILITOT, ALKPHOS, LIPASE in the last 72 hours.     Invalid input(s): AMYLASE  TROP  Lab Results   Component Value Date    TROPONINT < 0.010 01/27/2022    TROPONINT < 0.010 05/10/2016     BNP  No results for input(s): BNP in the last 72 hours. Lactic Acid  No results for input(s): LACTA in the last 72 hours. INR  Recent Labs     02/07/22  1215 02/08/22  0356   INR 1.12 1.02     PTT  No results for input(s): APTT in the last 72 hours. Glucose  Recent Labs     02/07/22  1610 02/07/22  1948 02/08/22  0805   POCGLU 306* 99 327*     UA No results for input(s): SPECGRAV, PHUR, COLORU, CLARITYU, MUCUS, PROTEINU, BLOODU, RBCUA, WBCUA, BACTERIA, NITRU, GLUCOSEU, BILIRUBINUR, UROBILINOGEN, KETUA, LABCAST, LABCASTTY, AMORPHOS in the last 72 hours. Invalid input(s): CRYSTALS. PFTs   N/A    Sleep studies   N/A    Cultures    · Body Fluid Culture from Thoracentesis - Positive for Staphylococcus hominis. EKG   Narrative & Impression    Normal sinus rhythm  Possible Left atrial enlargement  LVH ( Sokolow-English , [place] product )  Cannot rule out Septal infarct , age undetermined  T wave abnormality, consider lateral ischemia  Abnormal ECG  When compared with ECG of 10-MAY-2016 19:19,  Minimal criteria for Septal infarct are now Present  Confirmed by Sean Lockwood MD, Henry Mitchell ([id number]) on 1/27/2022 7:25:33 PM       Echocardiogram   N/A    Radiology    CXR  2/1/22 XR Chest 2 View:  1. Moderate right pleural effusion which is similar to the prior exam.   2. Right basilar opacities which may represent atelectasis or infiltrate. The appearance has somewhat improved since the previous study.           1/28/22 XR Chest 1 View: (post thoracentesis)  Decreased right pleural effusion without evidence of pneumothorax status post right thoracentesis.         CT Scans  (See actual reports for details)  1/27 CTA Chest:  There is a large somewhat loculated appearing pleural effusion on the right side.  There are adjacent areas of consolidation which may represent a combination of pneumonia and atelectasis. 2. There is a left upper lobe pulmonary mass. 3. Emphysematous changes.            Assessment   R Empyema growing Staph hominis s/p thoracentesis  3.2 cm x 1.5 cm cavitary pulmonary nodule found incidentally on CTA 1/27  Recent COVID-19 infection with superimposed bacterial pneumonia  Current smoker (~0.25 PPD, 40 year history  No Hx of previous lung disease  No Hx of concurrent CV disease     Plan   Continue antibiotics, ID consulted  Stable on room air  Interval resolution of right sided pleural effusion, CT Surgery not planning to place chest tube  Bronchoscopy tomorrow to evaluate cavitary lesion  NPO after midnight  Will send fleural for acid-fast bacilli  1x percocet given for pain    \"Thank you for asking us to see this patient\"    Questions and concerns addressed.     Electronically signed by   Stephon Yap DO on 2/8/2022 at 10:08 AM

## 2022-02-08 NOTE — CONSULTS
of 131, potassium 4.7, chloride 92, bicarb 22, BUN 36,  creatinine 1.7. He had a chest x-ray which showed right-sided pleural  effusion. It was reported that the pleural effusion has decreased in  volume. IMPRESSION:  He is a 60-year-old male patient admitted to the hospital  after he was found to have a positive culture from his pleural fluid. The patient at the time of my evaluation appears to be stable. He does  not have any shortness of breath or fever. He had a CAT scan done  during his last hospitalization. His followup x-ray appears to be much  improved. We will do a CAT scan of the chest without contrast to  evaluate loculated collection and we will make further recommendation  based on the report.         Damian Davey M.D.    D: 02/07/2022 16:41:05       T: 02/07/2022 17:59:38     [email or web address]  Job#: [id number]     Doc#: [id number]    CC:

## 2022-02-08 NOTE — CARE COORDINATION
2/8/22, 10:18 AM EST  DISCHARGE PLANNING EVALUATION:    Ta Angeles       Admitted: 2/7/2022/ Mikaela Alasdtmaritza 85 day: 1   Location: [facility]- Reason for admit: Empyema ([facility] [address] [J86.9]   PMH:  has a past medical history of GERD (gastroesophageal reflux disease), Headache(784.0), Hyperlipidemia, and Hypertension. Procedure: outpatient CT guided biopsy for further evaluation of SHAGUFTA lung nodule  Barriers to Discharge:  Patient was a direct admit from Pulmonologist's office. Consults to  Pulmonolgy and ID. Creatinine 1.5, blood glucose levels . Ancef q 8 hr., DM management, prn Tylenol and Zofran, daily BMP, CBC, Procalcitonin and INR in a.m., airborne isolation, SCD's, up with assistance. PCP: [facility], BARBARA - CNP  Readmission Risk Score: 12.2 ( )%    Patient Goals/Plan/Treatment Preferences: Met with Apolinar. He resides at home with his wife. Cynthia Blunt verifies his insurance and PCP. He is able to afford his medications and denies a need for DME. He will have transportation to home at discharge. Per chart review, Cynthia Blunt is going to need IV antibiotic therapy at discharge. Cynthia Blunt defers a list of [place] and [facility] agencies. He states [facility] ([facility] is fine for both. [facility] contacted to run his insurance benefits, spoke to Hostetter. She verifies Cynthia Blunt has coverage. Referral called to [facility], spoke to Cheng Dutton. She is not sure if they can accommodate his needs. She will review with her manager. Cynthia Blunt states his wife is legally blind without her glasses. Recently a dog got a hold of her glasses and ruined them. Cynthia Blunt has been working with [employer]" with no luck in getting her assistance. Will reach out to peers for suggestions. Transportation/Food Security/Housekeeping Addressed:  No issues identified. Joya at [facility] states they can accept the referral with a start date of Thursday a.m.

## 2022-02-09 ENCOUNTER — APPOINTMENT (OUTPATIENT)
Dept: GENERAL RADIOLOGY | Age: 52
DRG: 137 | End: 2022-02-09
Attending: INTERNAL MEDICINE
Payer: MEDICARE

## 2022-02-09 ENCOUNTER — ANESTHESIA (OUTPATIENT)
Dept: ENDOSCOPY | Age: 52
DRG: 137 | End: 2022-02-09
Payer: MEDICARE

## 2022-02-09 ENCOUNTER — ANESTHESIA EVENT (OUTPATIENT)
Dept: ENDOSCOPY | Age: 52
DRG: 137 | End: 2022-02-09
Payer: MEDICARE

## 2022-02-09 VITALS — DIASTOLIC BLOOD PRESSURE: 101 MMHG | OXYGEN SATURATION: 82 % | SYSTOLIC BLOOD PRESSURE: 195 MMHG

## 2022-02-09 LAB
ACINETOBACTER CALCOACETICUS-BAUMANNII BY PCR: NOT DETECTED
ACINETOBACTER CALCOACETICUS-BAUMANNII BY PCR: NOT DETECTED
ADENOVIRUS BY PCR: NOT DETECTED
ADENOVIRUS BY PCR: NOT DETECTED
ANION GAP SERPL CALCULATED.3IONS-SCNC: 10 MEQ/L (ref 8–16)
BUN BLDV-MCNC: 30 MG/DL (ref 7–22)
CALCIUM SERPL-MCNC: 8.8 MG/DL (ref 8.5–10.5)
CHLAMYDIA PNEUMONIAE BY PCR: NOT DETECTED
CHLAMYDIA PNEUMONIAE BY PCR: NOT DETECTED
CHLORIDE BLD-SCNC: 99 MEQ/L (ref 98–111)
CO2: 26 MEQ/L (ref 23–33)
CREAT SERPL-MCNC: 1.3 MG/DL (ref 0.4–1.2)
ENTEROBACTER CLOACAE COMPLEX BY PCR: NOT DETECTED
ENTEROBACTER CLOACAE COMPLEX BY PCR: NOT DETECTED
ERYTHROCYTE [DISTWIDTH] IN BLOOD BY AUTOMATED COUNT: 15.5 % (ref 11.5–14.5)
ERYTHROCYTE [DISTWIDTH] IN BLOOD BY AUTOMATED COUNT: 42.9 FL (ref 35–45)
ESCHERICHIA COLI BY PCR: NOT DETECTED
ESCHERICHIA COLI BY PCR: NOT DETECTED
GFR SERPL CREATININE-BSD FRML MDRD: 71 ML/MIN/1.73M2
GLUCOSE BLD-MCNC: 112 MG/DL (ref 70–108)
GLUCOSE BLD-MCNC: 148 MG/DL (ref 70–108)
GLUCOSE BLD-MCNC: 203 MG/DL (ref 70–108)
GLUCOSE BLD-MCNC: 234 MG/DL (ref 70–108)
GLUCOSE BLD-MCNC: 428 MG/DL (ref 70–108)
GLUCOSE BLD-MCNC: 484 MG/DL (ref 70–108)
HAEMOPHILUS INFLUENZAE BY PCR: NOT DETECTED
HAEMOPHILUS INFLUENZAE BY PCR: NOT DETECTED
HCT VFR BLD CALC: 39.4 % (ref 42–52)
HEMOGLOBIN: 12.2 GM/DL (ref 14–18)
INFLUENZA A BY PCR: NOT DETECTED
INFLUENZA A BY PCR: NOT DETECTED
INFLUENZA B BY PCR: NOT DETECTED
INFLUENZA B BY PCR: NOT DETECTED
INR BLD: 1 (ref 0.85–1.13)
KLEBSIELLA AEROGENES BY PCR: NOT DETECTED
KLEBSIELLA AEROGENES BY PCR: NOT DETECTED
KLEBSIELLA OXYTOCA BY PCR: NOT DETECTED
KLEBSIELLA OXYTOCA BY PCR: NOT DETECTED
KLEBSIELLA PNEUMONIAE GROUP BY PCR: NOT DETECTED
KLEBSIELLA PNEUMONIAE GROUP BY PCR: NOT DETECTED
LEGIONELLA PNEUMOPHILIA BY PCR: NOT DETECTED
LEGIONELLA PNEUMOPHILIA BY PCR: NOT DETECTED
MAGNESIUM: 2 MG/DL (ref 1.6–2.4)
MCH RBC QN AUTO: 24.1 PG (ref 26–33)
MCHC RBC AUTO-ENTMCNC: 31 GM/DL (ref 32.2–35.5)
MCV RBC AUTO: 77.9 FL (ref 80–94)
METAPNEUMOVIRUS BY PCR: NOT DETECTED
METAPNEUMOVIRUS BY PCR: NOT DETECTED
MORAXELLA CATARRHALIS BY PCR: NOT DETECTED
MORAXELLA CATARRHALIS BY PCR: NOT DETECTED
MYCOPLASMA PNEUMONIAE BY PCR: NOT DETECTED
MYCOPLASMA PNEUMONIAE BY PCR: NOT DETECTED
NON-SARS CORONAVIRUS: NOT DETECTED
NON-SARS CORONAVIRUS: NOT DETECTED
PARAINFLUENZA VIRUS BY PCR: NOT DETECTED
PARAINFLUENZA VIRUS BY PCR: NOT DETECTED
PLATELET # BLD: 601 THOU/MM3 (ref 130–400)
PMV BLD AUTO: 8.8 FL (ref 9.4–12.4)
POTASSIUM REFLEX MAGNESIUM: 4.9 MEQ/L (ref 3.5–5.2)
PROCALCITONIN: 0.05 NG/ML (ref 0.01–0.09)
PROTEUS SPECIES BY PCR: NOT DETECTED
PROTEUS SPECIES BY PCR: NOT DETECTED
PSEUDOMONAS AERUGINOSA BY PCR: NOT DETECTED
PSEUDOMONAS AERUGINOSA BY PCR: NOT DETECTED
RBC # BLD: 5.06 MILL/MM3 (ref 4.7–6.1)
RESISTANT GENE CTX-M BY PCR: NORMAL
RESISTANT GENE CTX-M BY PCR: NORMAL
RESISTANT GENE IMP BY PCR: NORMAL
RESISTANT GENE IMP BY PCR: NORMAL
RESISTANT GENE KPC BY PCR: NORMAL
RESISTANT GENE KPC BY PCR: NORMAL
RESISTANT GENE MECA/C & MREJ BY PCR: NORMAL
RESISTANT GENE MECA/C & MREJ BY PCR: NORMAL
RESISTANT GENE NDM BY PCR: NORMAL
RESISTANT GENE NDM BY PCR: NORMAL
RESISTANT GENE OXA-48-LIKE BY PCR: NORMAL
RESISTANT GENE OXA-48-LIKE BY PCR: NORMAL
RESISTANT GENE VIM BY PCR: NORMAL
RESISTANT GENE VIM BY PCR: NORMAL
RESPIRATORY SYNCYTIAL VIRUS BY PCR: NOT DETECTED
RESPIRATORY SYNCYTIAL VIRUS BY PCR: NOT DETECTED
RHINOVIRUS ENTEROVIRUS PCR: NOT DETECTED
RHINOVIRUS ENTEROVIRUS PCR: NOT DETECTED
SERRATIA MARCESCENS BY PCR: NOT DETECTED
SERRATIA MARCESCENS BY PCR: NOT DETECTED
SODIUM BLD-SCNC: 135 MEQ/L (ref 135–145)
SOURCE: NORMAL
SOURCE: NORMAL
SPECIMEN ACCEPTABILITY: NORMAL
SPECIMEN ACCEPTABILITY: NORMAL
STAPH AUREUS BY PCR: NOT DETECTED
STAPH AUREUS BY PCR: NOT DETECTED
STREP AGALACTIAE BY PCR: NOT DETECTED
STREP AGALACTIAE BY PCR: NOT DETECTED
STREP PNEUMONIAE BY PCR: NOT DETECTED
STREP PNEUMONIAE BY PCR: NOT DETECTED
STREP PYOGENES BY PCR: NOT DETECTED
STREP PYOGENES BY PCR: NOT DETECTED
WBC # BLD: 8.7 THOU/MM3 (ref 4.8–10.8)

## 2022-02-09 PROCEDURE — 87070 CULTURE OTHR SPECIMN AEROBIC: CPT

## 2022-02-09 PROCEDURE — 89051 BODY FLUID CELL COUNT: CPT

## 2022-02-09 PROCEDURE — 85027 COMPLETE CBC AUTOMATED: CPT

## 2022-02-09 PROCEDURE — 2580000003 HC RX 258: Performed by: INTERNAL MEDICINE

## 2022-02-09 PROCEDURE — 87486 CHLMYD PNEUM DNA AMP PROBE: CPT

## 2022-02-09 PROCEDURE — 87496 CYTOMEG DNA AMP PROBE: CPT

## 2022-02-09 PROCEDURE — 36415 COLL VENOUS BLD VENIPUNCTURE: CPT

## 2022-02-09 PROCEDURE — 6370000000 HC RX 637 (ALT 250 FOR IP): Performed by: INTERNAL MEDICINE

## 2022-02-09 PROCEDURE — 85610 PROTHROMBIN TIME: CPT

## 2022-02-09 PROCEDURE — 71045 X-RAY EXAM CHEST 1 VIEW: CPT

## 2022-02-09 PROCEDURE — 87581 M.PNEUMON DNA AMP PROBE: CPT

## 2022-02-09 PROCEDURE — 6360000002 HC RX W HCPCS: Performed by: PHYSICIAN ASSISTANT

## 2022-02-09 PROCEDURE — 88305 TISSUE EXAM BY PATHOLOGIST: CPT

## 2022-02-09 PROCEDURE — 87305 ASPERGILLUS AG IA: CPT

## 2022-02-09 PROCEDURE — 87631 RESP VIRUS 3-5 TARGETS: CPT

## 2022-02-09 PROCEDURE — 6370000000 HC RX 637 (ALT 250 FOR IP): Performed by: NURSE PRACTITIONER

## 2022-02-09 PROCEDURE — 87102 FUNGUS ISOLATION CULTURE: CPT

## 2022-02-09 PROCEDURE — 87798 DETECT AGENT NOS DNA AMP: CPT

## 2022-02-09 PROCEDURE — 99232 SBSQ HOSP IP/OBS MODERATE 35: CPT

## 2022-02-09 PROCEDURE — 7100000000 HC PACU RECOVERY - FIRST 15 MIN: Performed by: INTERNAL MEDICINE

## 2022-02-09 PROCEDURE — 99232 SBSQ HOSP IP/OBS MODERATE 35: CPT | Performed by: INTERNAL MEDICINE

## 2022-02-09 PROCEDURE — 31624 DX BRONCHOSCOPE/LAVAGE: CPT | Performed by: INTERNAL MEDICINE

## 2022-02-09 PROCEDURE — 87116 MYCOBACTERIA CULTURE: CPT

## 2022-02-09 PROCEDURE — 82948 REAGENT STRIP/BLOOD GLUCOSE: CPT

## 2022-02-09 PROCEDURE — 3700000001 HC ADD 15 MINUTES (ANESTHESIA): Performed by: INTERNAL MEDICINE

## 2022-02-09 PROCEDURE — 31628 BRONCHOSCOPY/LUNG BX EACH: CPT | Performed by: INTERNAL MEDICINE

## 2022-02-09 PROCEDURE — 87206 SMEAR FLUORESCENT/ACID STAI: CPT

## 2022-02-09 PROCEDURE — 87529 HSV DNA AMP PROBE: CPT

## 2022-02-09 PROCEDURE — 1200000000 HC SEMI PRIVATE

## 2022-02-09 PROCEDURE — 87541 LEGION PNEUMO DNA AMP PROB: CPT

## 2022-02-09 PROCEDURE — 2500000003 HC RX 250 WO HCPCS: Performed by: NURSE ANESTHETIST, CERTIFIED REGISTERED

## 2022-02-09 PROCEDURE — 83735 ASSAY OF MAGNESIUM: CPT

## 2022-02-09 PROCEDURE — 6370000000 HC RX 637 (ALT 250 FOR IP)

## 2022-02-09 PROCEDURE — 3700000000 HC ANESTHESIA ATTENDED CARE: Performed by: INTERNAL MEDICINE

## 2022-02-09 PROCEDURE — 3609027000 HC BRONCHOSCOPY: Performed by: INTERNAL MEDICINE

## 2022-02-09 PROCEDURE — 84145 PROCALCITONIN (PCT): CPT

## 2022-02-09 PROCEDURE — 6370000000 HC RX 637 (ALT 250 FOR IP): Performed by: PHYSICIAN ASSISTANT

## 2022-02-09 PROCEDURE — 6360000002 HC RX W HCPCS: Performed by: NURSE ANESTHETIST, CERTIFIED REGISTERED

## 2022-02-09 PROCEDURE — 0B9G8ZX DRAINAGE OF LEFT UPPER LUNG LOBE, VIA NATURAL OR ARTIFICIAL OPENING ENDOSCOPIC, DIAGNOSTIC: ICD-10-PCS | Performed by: INTERNAL MEDICINE

## 2022-02-09 PROCEDURE — 80048 BASIC METABOLIC PNL TOTAL CA: CPT

## 2022-02-09 PROCEDURE — 6360000002 HC RX W HCPCS: Performed by: INTERNAL MEDICINE

## 2022-02-09 PROCEDURE — 87205 SMEAR GRAM STAIN: CPT

## 2022-02-09 PROCEDURE — 7100000001 HC PACU RECOVERY - ADDTL 15 MIN: Performed by: INTERNAL MEDICINE

## 2022-02-09 PROCEDURE — 88112 CYTOPATH CELL ENHANCE TECH: CPT

## 2022-02-09 PROCEDURE — 0BD88ZX EXTRACTION OF LEFT UPPER LOBE BRONCHUS, VIA NATURAL OR ARTIFICIAL OPENING ENDOSCOPIC, DIAGNOSTIC: ICD-10-PCS | Performed by: INTERNAL MEDICINE

## 2022-02-09 PROCEDURE — 87255 GENET VIRUS ISOLATE HSV: CPT

## 2022-02-09 RX ORDER — MORPHINE SULFATE 2 MG/ML
2 INJECTION, SOLUTION INTRAMUSCULAR; INTRAVENOUS
Status: DISCONTINUED | OUTPATIENT
Start: 2022-02-09 | End: 2022-02-10

## 2022-02-09 RX ORDER — MORPHINE SULFATE 4 MG/ML
4 INJECTION, SOLUTION INTRAMUSCULAR; INTRAVENOUS
Status: DISCONTINUED | OUTPATIENT
Start: 2022-02-09 | End: 2022-02-10

## 2022-02-09 RX ORDER — PROPOFOL 10 MG/ML
INJECTION, EMULSION INTRAVENOUS PRN
Status: DISCONTINUED | OUTPATIENT
Start: 2022-02-09 | End: 2022-02-09 | Stop reason: SDUPTHER

## 2022-02-09 RX ORDER — EPINEPHRINE 1 MG/ML(1)
0.6 AMPUL (ML) INJECTION ONCE
Status: COMPLETED | OUTPATIENT
Start: 2022-02-09 | End: 2022-02-09

## 2022-02-09 RX ORDER — SUCCINYLCHOLINE/SOD CL,ISO/PF 200MG/10ML
SYRINGE (ML) INTRAVENOUS PRN
Status: DISCONTINUED | OUTPATIENT
Start: 2022-02-09 | End: 2022-02-09 | Stop reason: SDUPTHER

## 2022-02-09 RX ORDER — LIDOCAINE HYDROCHLORIDE 20 MG/ML
INJECTION, SOLUTION INTRAVENOUS PRN
Status: DISCONTINUED | OUTPATIENT
Start: 2022-02-09 | End: 2022-02-09 | Stop reason: SDUPTHER

## 2022-02-09 RX ORDER — IPRATROPIUM BROMIDE AND ALBUTEROL SULFATE 2.5; .5 MG/3ML; MG/3ML
1 SOLUTION RESPIRATORY (INHALATION) ONCE
Status: COMPLETED | OUTPATIENT
Start: 2022-02-09 | End: 2022-02-09

## 2022-02-09 RX ORDER — MORPHINE SULFATE 2 MG/ML
2 INJECTION, SOLUTION INTRAMUSCULAR; INTRAVENOUS ONCE
Status: COMPLETED | OUTPATIENT
Start: 2022-02-09 | End: 2022-02-09

## 2022-02-09 RX ORDER — OXYCODONE HYDROCHLORIDE AND ACETAMINOPHEN 5; 325 MG/1; MG/1
1 TABLET ORAL EVERY 4 HOURS PRN
Status: DISCONTINUED | OUTPATIENT
Start: 2022-02-09 | End: 2022-02-10 | Stop reason: HOSPADM

## 2022-02-09 RX ORDER — OXYCODONE HYDROCHLORIDE AND ACETAMINOPHEN 5; 325 MG/1; MG/1
2 TABLET ORAL EVERY 4 HOURS PRN
Status: DISCONTINUED | OUTPATIENT
Start: 2022-02-09 | End: 2022-02-10 | Stop reason: HOSPADM

## 2022-02-09 RX ORDER — CEPHALEXIN 500 MG/1
500 CAPSULE ORAL EVERY 6 HOURS SCHEDULED
Status: DISCONTINUED | OUTPATIENT
Start: 2022-02-09 | End: 2022-02-10 | Stop reason: HOSPADM

## 2022-02-09 RX ORDER — IPRATROPIUM BROMIDE AND ALBUTEROL SULFATE 2.5; .5 MG/3ML; MG/3ML
SOLUTION RESPIRATORY (INHALATION)
Status: COMPLETED
Start: 2022-02-09 | End: 2022-02-09

## 2022-02-09 RX ADMIN — CEPHALEXIN 500 MG: 500 CAPSULE ORAL at 18:25

## 2022-02-09 RX ADMIN — INSULIN LISPRO 18 UNITS: 100 INJECTION, SOLUTION INTRAVENOUS; SUBCUTANEOUS at 17:10

## 2022-02-09 RX ADMIN — MORPHINE SULFATE 2 MG: 2 INJECTION, SOLUTION INTRAMUSCULAR; INTRAVENOUS at 05:54

## 2022-02-09 RX ADMIN — Medication 0.6 MG: at 12:55

## 2022-02-09 RX ADMIN — LIDOCAINE HYDROCHLORIDE 100 MG: 20 INJECTION, SOLUTION INTRAVENOUS at 12:26

## 2022-02-09 RX ADMIN — ATORVASTATIN CALCIUM 40 MG: 40 TABLET, FILM COATED ORAL at 22:28

## 2022-02-09 RX ADMIN — Medication 120 MG: at 12:26

## 2022-02-09 RX ADMIN — SODIUM CHLORIDE, POTASSIUM CHLORIDE, SODIUM LACTATE AND CALCIUM CHLORIDE: 600; 310; 30; 20 INJECTION, SOLUTION INTRAVENOUS at 06:17

## 2022-02-09 RX ADMIN — ACETAMINOPHEN 650 MG: 325 TABLET ORAL at 15:17

## 2022-02-09 RX ADMIN — CEFAZOLIN 2000 MG: 10 INJECTION, POWDER, FOR SOLUTION INTRAVENOUS at 06:18

## 2022-02-09 RX ADMIN — CEFAZOLIN 2000 MG: 10 INJECTION, POWDER, FOR SOLUTION INTRAVENOUS at 15:12

## 2022-02-09 RX ADMIN — OXYCODONE AND ACETAMINOPHEN 1 TABLET: 5; 325 TABLET ORAL at 22:28

## 2022-02-09 RX ADMIN — IPRATROPIUM BROMIDE AND ALBUTEROL SULFATE 1 AMPULE: 2.5; .5 SOLUTION RESPIRATORY (INHALATION) at 13:19

## 2022-02-09 RX ADMIN — IPRATROPIUM BROMIDE AND ALBUTEROL SULFATE 1 AMPULE: .5; 3 SOLUTION RESPIRATORY (INHALATION) at 13:19

## 2022-02-09 RX ADMIN — PROPOFOL 150 MG: 10 INJECTION, EMULSION INTRAVENOUS at 12:26

## 2022-02-09 RX ADMIN — SODIUM CHLORIDE, POTASSIUM CHLORIDE, SODIUM LACTATE AND CALCIUM CHLORIDE: 600; 310; 30; 20 INJECTION, SOLUTION INTRAVENOUS at 14:12

## 2022-02-09 RX ADMIN — INSULIN GLARGINE 5 UNITS: 100 INJECTION, SOLUTION SUBCUTANEOUS at 22:29

## 2022-02-09 RX ADMIN — OXYCODONE HYDROCHLORIDE AND ACETAMINOPHEN 2 TABLET: 5; 325 TABLET ORAL at 18:31

## 2022-02-09 ASSESSMENT — PAIN DESCRIPTION - LOCATION
LOCATION: CHEST

## 2022-02-09 ASSESSMENT — PAIN DESCRIPTION - PAIN TYPE
TYPE: ACUTE PAIN

## 2022-02-09 ASSESSMENT — PULMONARY FUNCTION TESTS
PIF_VALUE: 19
PIF_VALUE: 21
PIF_VALUE: 18
PIF_VALUE: 17
PIF_VALUE: 17
PIF_VALUE: 27
PIF_VALUE: 16
PIF_VALUE: 11
PIF_VALUE: 17
PIF_VALUE: 23
PIF_VALUE: -4
PIF_VALUE: 17
PIF_VALUE: 17
PIF_VALUE: 15
PIF_VALUE: 1
PIF_VALUE: 16
PIF_VALUE: 32
PIF_VALUE: 19
PIF_VALUE: 17
PIF_VALUE: 23
PIF_VALUE: 21
PIF_VALUE: 19
PIF_VALUE: 21
PIF_VALUE: 4
PIF_VALUE: 24
PIF_VALUE: 14
PIF_VALUE: 19
PIF_VALUE: 21
PIF_VALUE: 14
PIF_VALUE: 19
PIF_VALUE: 4
PIF_VALUE: 20
PIF_VALUE: 20
PIF_VALUE: 18
PIF_VALUE: 1
PIF_VALUE: 27
PIF_VALUE: 17
PIF_VALUE: 17
PIF_VALUE: 21
PIF_VALUE: 20
PIF_VALUE: 1
PIF_VALUE: 17
PIF_VALUE: 20
PIF_VALUE: 17

## 2022-02-09 ASSESSMENT — PAIN DESCRIPTION - DESCRIPTORS
DESCRIPTORS: SHARP;STABBING

## 2022-02-09 ASSESSMENT — PAIN DESCRIPTION - ONSET
ONSET: ON-GOING

## 2022-02-09 ASSESSMENT — PAIN DESCRIPTION - ORIENTATION
ORIENTATION: RIGHT
ORIENTATION: MID

## 2022-02-09 ASSESSMENT — PAIN SCALES - GENERAL
PAINLEVEL_OUTOF10: 10
PAINLEVEL_OUTOF10: 0
PAINLEVEL_OUTOF10: 7
PAINLEVEL_OUTOF10: 6
PAINLEVEL_OUTOF10: 6
PAINLEVEL_OUTOF10: 7
PAINLEVEL_OUTOF10: 6

## 2022-02-09 ASSESSMENT — PAIN DESCRIPTION - FREQUENCY
FREQUENCY: CONTINUOUS

## 2022-02-09 ASSESSMENT — PAIN - FUNCTIONAL ASSESSMENT: PAIN_FUNCTIONAL_ASSESSMENT: 0-10

## 2022-02-09 NOTE — PROGRESS NOTES
Comprehensive Nutrition Assessment    Type and Reason for Visit:  Initial (Verbal consult to eval for malnutrition)    Nutrition Recommendations/Plan:   · Continue current diet as ordered- Regular, 3 Carb Choices (45 gm/meal) if strict blood sugar control warranted. · Will trial Glucerna daily (strawberry or vanilla flavor). Nutrition Assessment:   Pt. nutritionally compromised AEB noted/reported weight loss. At risk for further nutrition compromise r/t recent covid-19 diagnosis, recent thoracentesis and underlying medical condition (HTN, HLD, GERD). Malnutrition Assessment:  Malnutrition Status: At risk for malnutrition (Comment)    Context:  Acute Illness     Findings of the 6 clinical characteristics of malnutrition:  Energy Intake:  No significant decrease in energy intake (Patient reports good appetite with no significant changes in appetite recently)  Weight Loss:  Weight loss noted of -46 lbs since stated weight of 9/28/21 (if stated weight accurate)     Body Fat Loss:  Unable to assess     Muscle Mass Loss:  Unable to assess    Fluid Accumulation:  No significant fluid accumulation     Strength:  Not Performed    Estimated Daily Nutrient Needs:  Energy (kcal):  7176-8448 kcal/kg (28-30 kcal/kg); Weight Used for Energy Requirements:  Current, 58.4 kg     Protein (g):  70 gm/kg (1.2 gm/kg); Weight Used for Protein Requirements:  Current, 58.4 kg        Fluid (ml/day):  7378-0923 ml/day; Method Used for Fluid Requirements:  1 ml/kcal      Nutrition Related Findings:       Writer spoke with patient this date. Patient reports a good appetite with no significant changes in appetite recently. Reports weight loss of 30 lb within last 6 months. Weight loss noted, but based off started weight. No issues with N/V/D reported. Patient willing to trial nutritional supplement daily. Writer went over different sources of protein.  Patient reports he ate 100% of his lunch meal today of double hamburger, grilled chicken, and green beans. Treatments/Miscellaneous: s/p bronch (2/9). Ruling out TB vs possible malignancy r/t noted cavity lesion on CT. S/p thoracentesis 1/28. Hx recent covid-19 dx and found to have right pleural effusion and thoracentesis cultures recently grew stap hominis. Continues on abx. GI Status: No BM recorded since admit. Pertinent Labs: BUN 30, Cr 1.3, GFR 71, Glu 234, hgb 12.2 (2/9); Hgb A1c 7.8% (2/7)  Pertinent Meds: norvasc, lipitor, zyrtec, hydodiuril, lantus, humalog; PRN zofran, percocet, glycolax    Wounds:  None       Current Nutrition Therapies:    ADULT DIET; Regular; 3 carb choices (45 gm/meal)    Anthropometric Measures:  · Height: 5' 7.01\" (170.2 cm)  · Current Body Weight: 128 lb 12 oz (58.4 kg) ((2/7)- no edmea noted)   · Admission Body Weight: 128 lb 12 oz (58.4 kg) (2/7- no edema noted)    · Usual Body Weight:  (Patient reports UBW around 176 lbs)     · Ideal Body Weight: 148 lbs; % Ideal Body Weight 87 %   · BMI: 20.2  · BMI Categories: Normal Weight (BMI 18.5-24. 9)       Nutrition Diagnosis:   · Unintended weight loss related to suspect in part of - recent thoracentesis; possibly multifactoral as evidenced by patient report of 30 lb weight loss within last 6 month; weight loss noted of -46 lbs since stated weight on (9/28/21). Nutrition Interventions:   Food and/or Nutrient Delivery:  Continue Current Diet,Start Oral Nutrition Supplement (Ensure daily (strawberry or vanilla flavor))  Nutrition Education/Counseling:  Education initiated (Encouraged increased PO oral intakes and protein for healing)   Coordination of Nutrition Care:  Continue to monitor while inpatient    Goals:  Patient to consume 75% or more of nutritional needs throughout LOS.        Nutrition Monitoring and Evaluation:   Behavioral-Environmental Outcomes:  None Identified   Food/Nutrient Intake Outcomes:  Food and Nutrient Intake  Physical Signs/Symptoms Outcomes:  Biochemical Data,Chewing or Swallowing,GI Status,Fluid Status or Edema,Nutrition Focused Physical Findings,Skin,Weight     Discharge Planning:     Too soon to determine     Electronically signed by José Joseph RD on 2/9/22 at 4:15 PM EST    Contact: [phone]

## 2022-02-09 NOTE — PLAN OF CARE
Problem: Nutrition  Goal: Optimal nutrition therapy  Outcome: Ongoing     Nutrition Problem #1: Unintended weight loss  Intervention: Food and/or Nutrient Delivery: Continue Current Diet,Start Oral Nutrition Supplement (Ensure daily (strawberry or vanilla flavor))  Nutritional Goals: Patient to consume 75% or more of nutritional needs throughout LOS.

## 2022-02-09 NOTE — PROGRESS NOTES
[facility]      Patient - El Queen   MRN -  [id number]   [facility]   - 1970      Date of Admission -  2022 10:33 AM  Date of evaluation -  2022  Room - [facility], [address] [facility] Physician - ANGELL U[facility], APRN - CNP     Problem List      Active Hospital Problems    Diagnosis Date Noted    Mass of upper lobe of left lung [R91.8]     Empyema [facility]) [J86.9] 2022     Reason for Consult    Right Empyema  HPI   History Obtained From: Patient and electronic medical record. Chief complaint/[place]: El Queen is a 49 y. o.old male came for further evaluation regarding his with referral from [facility] hospitalist service for incidental lung mass found on ORLANDO lobe on CTA chest on . The patient had recently been hospitalized for COVID-19 PNA with superimposed bacterial pneumonia and pleural effusion s/p thoracentesis. He states that he feels much better following his discharge on . He states that he still has some lingering chest pain on his R side, and that he has slight dry cough with occasional production of clear sputum. He believes he lost some weight while in the hospital. He denies any fever, chills, wheezing, SOB, congestion, sore throat, hemoptysis, nausea, vomiting, diarrhea, headache, dizziness, or any other symptoms.     He denies any history of lung disorders, CV disease, or any use of aspirin or plavix. He was seen in our clinic for follow-up on 2022 and we discussed readmission to the hospital for further management of his staph hominis cultures and subsequent empyema. Patient admitted for IV antibiotics and potential chest tube placement for recurrent right-sided pleural effusion. Subjective/Events Past 24 hours/ROS   Bronchoscopy and bx today   No pulmonary events overnight  No distress on room air- 96%    -All systems reviewed.      PMHx   Past Medical History      Diagnosis Date    GERD (gastroesophageal reflux disease)     Headache(784.0)     Hyperlipidemia     Hypertension       Past Surgical History        Procedure Laterality Date    MANDIBLE FRACTURE SURGERY       Meds    Current Medications    insulin lispro  0-18 Units SubCUTAneous TID WC    insulin lispro  0-9 Units SubCUTAneous Nightly    insulin glargine  5 Units SubCUTAneous Nightly    amLODIPine  10 mg Oral Daily    atorvastatin  40 mg Oral Nightly    [Held by provider] hydroCHLOROthiazide  25 mg Oral Daily    cetirizine  10 mg Oral Daily    sodium chloride flush  5-40 mL IntraVENous 2 times per day    ceFAZolin  2,000 mg IntraVENous Q8H     morphine **OR** morphine, oxyCODONE-acetaminophen **OR** oxyCODONE-acetaminophen, fluticasone, sodium chloride flush, sodium chloride, ondansetron **OR** ondansetron, polyethylene glycol, acetaminophen **OR** acetaminophen, glucose, dextrose, glucagon (rDNA), dextrose  IV Drips/Infusions   lactated ringers 100 mL/hr at 22 1412    sodium chloride      dextrose       Home Medications  Medications Prior to Admission: dexamethasone (DECADRON) 4 MG tablet, Take 6 mg by mouth daily (with breakfast)  hydroCHLOROthiazide (HYDRODIURIL) 25 MG tablet, Take 1 tablet by mouth daily  amLODIPine (NORVASC) 10 MG tablet, Take 1 tablet by mouth daily  [] levoFLOXacin (LEVAQUIN) 750 MG tablet, Take 1 tablet by mouth daily for 5 days  acetaminophen (AMINOFEN) 325 MG tablet, Take 2 tablets by mouth every 6 hours as needed for Pain  lisinopril (PRINIVIL;ZESTRIL) 40 MG tablet, Take 1 tablet by mouth daily (Patient not taking: Reported on 2022)  fluticasone (FLONASE) 50 MCG/ACT nasal spray, 1 spray by Each Nare route daily (Patient not taking: Reported on 2022)  loratadine (CLARITIN) 10 MG tablet, Take 1 tablet by mouth daily (Patient not taking: Reported on 2022)  atorvastatin (LIPITOR) 40 MG tablet, take 1 tablet by mouth once daily (Patient not taking: Reported on 2/7/2022)  Diet    ADULT DIET; Regular; 3 carb choices (45 gm/meal)  Allergies    Aspirin and Motrin [ibuprofen micronized]  Social History     Social History     Socioeconomic History    Marital status: Single     Spouse name: Not on file    Number of children: Not on file    Years of education: Not on file    Highest education level: Not on file   Occupational History    Not on file   Tobacco Use    Smoking status: Current Every Day Smoker     Packs/day: 0.20     Years: 28.00     Pack years: 5.60     Types: Cigarettes    Smokeless tobacco: Never Used   Vaping Use    Vaping Use: Never used   Substance and Sexual Activity    Alcohol use: Yes     Alcohol/week: 0.0 standard drinks     Comment: Spends $39. a day on beer (49 ozs) pt states he quit a month ago, pt denies     Drug use: Yes     Types: Marijuana (Weed)     Comment:  pt denies quit a month ago     Sexual activity: Not on file   Other Topics Concern    Not on file   Social History Narrative    Not on file     Social Determinants of Health     Financial Resource Strain:     Difficulty of Paying Living Expenses: Not on file   Food Insecurity:     Worried About Running Out of Food in the Last Year: Not on file    Darius of Food in the Last Year: Not on file   Transportation Needs:     Lack of Transportation (Medical): Not on file    Lack of Transportation (Non-Medical):  Not on file   Physical Activity:     Days of Exercise per Week: Not on file    Minutes of Exercise per Session: Not on file   Stress:     Feeling of Stress : Not on file   Social Connections:     Frequency of Communication with Friends and Family: Not on file    Frequency of Social Gatherings with Friends and Family: Not on file    Attends Scientology Services: Not on file    Active Member of Clubs or Organizations: Not on file    Attends Club or Organization Meetings: Not on file    Marital Status: Not on file   Intimate Partner Violence:     Fear of Current or Ex-Partner: Not on file    Emotionally Abused: Not on file    Physically Abused: Not on file    Sexually Abused: Not on file   Housing Stability:     Unable to Pay for Housing in the Last Year: Not on file    Number of Places Lived in the Last Year: Not on file    Unstable Housing in the Last Year: Not on file     Family History          Problem Relation Age of Onset    Heart Disease Mother     Diabetes Mother     Hypertension Mother      Sleep History    Never diagnosed with sleep apnea in the past.  Occupational history   Occupation:  He is current working: No  Type of profession: unemployed. History of tobacco smoking:Yes  Amount of tobacco smokin.25 PPD. Years of tobacco smokin. Quit smoking: No.              Current smoker: Yes. Amount of current tobacco smokin.25 PPD  . History of recreational or IV drug use in the past:NO     History of exposure to coal mines/coal dust: NO  History of exposure to foundry dust/welding: NO  History of exposure to quarry/silica/sandblasting: NO  History of exposure to asbestos/working with breaks/ships: NO  History of exposure to farm dust: NO  History of recent travel to long distances: NO  History of exposure to birds, pigeons, or chickens in the past:NO  Pet animals at home:Yes  Dogs: 2    History of pulmonary embolism in the past: No            History of DVT in the past:No        [] Right lower extremity   [] Left lower extremity. Vitals     temporal temperature is 98.2 °F (36.8 °C). His blood pressure is 144/88 (abnormal) and his pulse is 91. His respiration is 18 and oxygen saturation is 96%. There is no height or weight on file to calculate BMI.     SUPPLEMENTAL O2:       I/O        Intake/Output Summary (Last 24 hours) at 2022 1436  Last data filed at 2022 0825  Gross per 24 hour   Intake 810 ml   Output 1600 ml   Net -790 ml     I/O last 3 completed shifts: In: 1700 [P.O.:1680; I.V.:20]  Out: 1600 [Urine:1600]   No data found. Exam   Nursing note and vitals reviewed. Physical Exam   Constitutional: No distress on room air . Patient appears moderately built and  moderately nourished. Mouth/Throat: Oropharynx is clear and moist.  No oral thrush. Eyes: Conjunctivae are normal. Pupils are equal, round. No scleral icterus. Neck: Neck supple. No tracheal deviation present. Cardiovascular: S1 and S2 with no murmur. No peripheral edema  Pulmonary/Chest: Normal effort with bilateral air entry, clear breath sounds. No stridor. No respiratory distress. Patient exhibits no tenderness. Abdominal: Soft. Bowel sounds audible. No distension or tenderness to palp. Musculoskeletal: Moves all extremities; no cyanosis or clubbing   Neurological: Patient is alert and oriented to person, place, and time. Skin: Warm and dry. Labs  - Old records and notes have been reviewed in [facility]   ABG  No results found for: PH, PO2, PCO2, HCO3, O2SAT  No results found for: IFIO2, MODE, SETTIDVOL, SETPEEP  CBC  Recent Labs     02/07/22  1215 02/08/22  0356 02/09/22  0418   WBC 9.7 9.7 8.7   RBC 5.53 5.19 5.06   HGB 13.7* 12.6* 12.2*   HCT 42.6 39.6* 39.4*   MCV 77.0* 76.3* 77.9*   MCH 24.8* 24.3* 24.1*   MCHC 32.2 31.8* 31.0*   * 638* 601*   MPV 8.7* 8.7* 8.8*      BMP  Recent Labs     02/07/22  1215 02/08/22  0356 02/09/22  0418   * 132* 135   K 4.7 4.4 4.9   CL 93* 95* 99   CO2 23 26 26   BUN 36* 36* 30*   CREATININE 1.7* 1.5* 1.3*   GLUCOSE 472* 362* 234*   MG  --  2.3 2.0   CALCIUM 8.9 8.6 8.8     LFT  No results for input(s): AST, ALT, ALB, BILITOT, ALKPHOS, LIPASE in the last 72 hours. Invalid input(s): AMYLASE  TROP  Lab Results   Component Value Date    TROPONINT < 0.010 01/27/2022    TROPONINT < 0.010 05/10/2016     BNP  No results for input(s): BNP in the last 72 hours.   Lactic Acid  No results for input(s): LACTA in the last 72 hours.  INR  Recent Labs     02/07/22  1215 02/08/22  0356 02/09/22  0418   INR 1.12 1.02 1.00     PTT  No results for input(s): APTT in the last 72 hours. Glucose  Recent Labs     02/08/22  1053 02/08/22 2001 02/09/22  0807   POCGLU 195* 485* 203*     UA No results for input(s): SPECGRAV, PHUR, COLORU, CLARITYU, MUCUS, PROTEINU, BLOODU, RBCUA, WBCUA, BACTERIA, NITRU, GLUCOSEU, BILIRUBINUR, UROBILINOGEN, KETUA, LABCAST, LABCASTTY, AMORPHOS in the last 72 hours. Invalid input(s): CRYSTALS. PFTs   N/A    Sleep studies   N/A    Cultures    · Body Fluid Culture from Thoracentesis - Positive for Staphylococcus hominis. 2/9/22  Bronchoscopy   Endobronchial findings:   Trachea: Normal mucosa. April: Normal mucosa  Right main bronchus: Normal mucosa  Right upper lobe bronchus: Normal mucosa  Right Middle lobe bronchus: Normal mucosa  Right Lower lobe bronchus:Normal mucosa  Left main bronchus: Normal mucosa  Left upper lobe bronchus: Normal mucosa  Left lower lobe bronchus: Normal mucosa     No endobronchial lesions noted     Patient noted to have minimal oozing of blood from left upper lobe anterior segment after having transbronchial lung biopsy from left upper lobe anterior segment. It was effectively controlled by applying 2ml of topical diluted epinephrine. Hemostasis secured at the end of the procedure    EKG   Narrative & Impression    Normal sinus rhythm  Possible Left atrial enlargement  LVH ( Sokolow-English , [place] product )  Cannot rule out Septal infarct , age undetermined  T wave abnormality, consider lateral ischemia  Abnormal ECG  When compared with ECG of 10-MAY-2016 19:19,  Minimal criteria for Septal infarct are now Present  Confirmed by Isaias Dietrich MD, Haydee Cuevas ([id number]) on 1/27/2022 7:25:33 PM       Echocardiogram   N/A    Radiology    CXR  2/9/2022   XR CHEST PORTABLE   Patchy left perihilar airspace opacity is more prominent. Linear right lower lobe atelectasis/airspace opacities are unchanged.  No pneumothorax is observed.          CT Scans  (See actual reports for details)  1/27 CTA Chest:  There is a large somewhat loculated appearing pleural effusion on the right side. There are adjacent areas of consolidation which may represent a combination of pneumonia and atelectasis. 2. There is a left upper lobe pulmonary mass. 3. Emphysematous changes.            Assessment   R Empyema growing Staph hominis s/p thoracentesis  3.2 cm x 1.5 cm cavitary pulmonary nodule found incidentally on CTA 1/27  Recent COVID-19 infection with superimposed bacterial pneumonia  Current smoker (~0.25 PPD, 40 year history  No Hx of previous lung disease  No Hx of concurrent CV disease  Full Code      Plan   Continue antibiotics, ID consulted  Bronchoscopy completed today at [address] his family were educated about my impression and plan. Cortez Garcia his family were informed about the risks and complications associated with bronchoscopy with biopsy including but not limited to pneumothorax with requirement of chest tube placement. Cortez Garcia his family  verbalizes understanding. Interval resolution of right sided pleural effusion, CT Surgery not planning to place chest tube  Will send fleural for acid-fast bacilli  1x percocet given for pain  Will follow bronchoscopy cultures/cytologies from today 2/9    Plan of care discussed with patient and primary RN  Meds and orders reviewed. Questions and concerns addressed. Electronically signed by   BARBARA Calvo CNP on 2/9/2022 at 2:36 PM     Addendum by Dr. Holden Mcelroy MD:  I have seen and examined the patient independently. Face to face evaluation and examination was performed. The above evaluation and note has been reviewed. Labs and radiographs were reviewed. I Have discussed with Ms. NIKKI Calvo CNP about this patient in detail. Physical exam was performed by me. See below for details.  More than half of the total time for this encounter spent by me. The above assessment and plan has been reviewed. Please see my modifications mentioned below. My modifications:    Physical Exam:  Constitutional: Patient appears in no distress. Mouth/Throat: Oropharynx is clear and moist.    Neck: Neck supple. Cardiovascular: S1 and S2 heard. Pulmonary/Chest: Bilateral air entry present. Good breath sounds on both sides, diminished at bases. No wheezes. No rales. Abdominal: Soft. No tenderness. Extremities: Patient exhibits no edema. Neurological: Patient is awake and alert. Plan:  -Patient for Bronchoscopy with  biopsy under fluoroscopy in endoscopy suite at [address] [place] [address] with anesthesia from anaesthesilogy department.  -Johnette Hatchet educated and informed about bronchoscopy procedure,method, complicantions of procedure including but not limited to development of pneumothorax with requirement of chest tube placement. He agreed for the procedure and verbalizes understanding. Chest Xray done on 2/09/22:  PROCEDURE: XR CHEST PORTABLE   CLINICAL INFORMATION: Left bronchoscopy   Patchy left perihilar airspace opacity is more prominent. Linear right lower lobe atelectasis/airspace opacities are unchanged. No pneumothorax is observed.          Shahida Pagan MD 2/9/2022 7:00 PM

## 2022-02-09 NOTE — PRE SEDATION
[address] [place] [address] Endoscopy  Sedation Pre-Procedure Note    Patient Name: Mg Mcdonald    YOB: 1970   Room/Bed: 8[address] Record Number: [id number]  Date: 2/9/2022  Time: 1:20 PM     Indication:  Pain control for Flexible Fibrooptic Bronchoscopy. Consent: I have discussed with the patient and/or the patient representative the indication, alternatives, and the possible risks and/or complications of the planned procedure and the anesthesia methods. The patient and/or patient representative appear to understand and agree to proceed.     Vital Signs: BP (!) 143/80   Pulse 77   Temp 97.8 °F (36.6 °C) (Oral)   Resp 18   SpO2 98%       Past Medical History:  Past Medical History:   Diagnosis Date    GERD (gastroesophageal reflux disease)     Headache(784.0)     Hyperlipidemia     Hypertension          Allergy:  Allergies   Allergen Reactions    Aspirin Hives and Rash    Motrin [Ibuprofen Micronized] Hives and Rash         Past Surgical History:  Past Surgical History:   Procedure Laterality Date    MANDIBLE FRACTURE SURGERY         Medications:   Current Facility-Administered Medications   Medication Dose Route Frequency Provider Last Rate Last Admin    morphine (PF) injection 2 mg  2 mg IntraVENous Q2H PRN BARBARA Gramajo CNP        Or    morphine injection 4 mg  4 mg IntraVENous Q2H PRN BARBARA Gramajo CNP        oxyCODONE-acetaminophen (PERCOCET) 5-325 MG per tablet 1 tablet  1 tablet Oral Q4H PRN BARBARA Lemus CNP        Or    oxyCODONE-acetaminophen (PERCOCET) 5-325 MG per tablet 2 tablet  2 tablet Oral Q4H PRN BARBARA Lemus CNP        insulin lispro (HUMALOG) injection vial 0-18 Units  0-18 Units SubCUTAneous TID  BARBARA Cardona CNP        insulin lispro (HUMALOG) injection vial 0-9 Units  0-9 Units SubCUTAneous Nightly BARBARA Cardona CNP        ipratropium-albuterol (DUONEB) 0.5-2.5 (3) MG/3ML nebulizer solution             lactated ringers infusion   IntraVENous Continuous Km Yi  mL/hr at 02/09/22 1307 Rate Change at 02/09/22 1307    insulin glargine (LANTUS) injection vial 5 Units  5 Units SubCUTAneous Nightly HIRAM Elise        amLODIPine (NORVASC) tablet 10 mg  10 mg Oral Daily Km Yi MD   10 mg at 02/08/22 9162    atorvastatin (LIPITOR) tablet 40 mg  40 mg Oral Nightly Km Yi MD   40 mg at 02/08/22 2107    fluticasone (FLONASE) 50 MCG/ACT nasal spray 1 spray  1 spray Each Nostril Daily PRN Km Yi MD        [Held by provider] hydroCHLOROthiazide (HYDRODIURIL) tablet 25 mg  25 mg Oral Daily Km Yi MD   25 mg at 02/08/22 0500    cetirizine (ZYRTEC) tablet 10 mg  10 mg Oral Daily Km Yi MD   10 mg at 02/08/22 8561    sodium chloride flush 0.9 % injection 5-40 mL  5-40 mL IntraVENous 2 times per day Km Yi MD   10 mL at 02/08/22 1367    sodium chloride flush 0.9 % injection 5-40 mL  5-40 mL IntraVENous PRN Km Yi MD        0.9 % sodium chloride infusion  25 mL IntraVENous PRN Km Yi MD        ondansetron (ZOFRAN-ODT) disintegrating tablet 4 mg  4 mg Oral Q8H PRN Km Yi MD        Or    ondansetron [facility]) injection 4 mg  4 mg IntraVENous Q6H PRN Km Yi MD        polyethylene glycol (GLYCOLAX) packet 17 g  17 g Oral Daily PRN Km Yi MD        acetaminophen (TYLENOL) tablet 650 mg  650 mg Oral Q6H PRN Km Yi MD   650 mg at 02/08/22 1848    Or    acetaminophen (TYLENOL) suppository 650 mg  650 mg Rectal Q6H PRN Km Yi MD        glucose (GLUTOSE) 40 % oral gel 15 g  15 g Oral PRN Km Yi MD        dextrose 50 % IV solution  12.5 g IntraVENous PRN Km Yi MD        glucagon (rDNA) injection 1 mg  1 mg IntraMUSCular PRN Aletha Mc MD [place]        dextrose 5 % solution  100 mL/hr IntraVENous PRN Yrn Hart MD        ceFAZolin (ANCEF) 2000 mg in dextrose 5 % 50 mL IVPB  2,000 mg IntraVENous Q8H David Owusu MD   Stopped at 02/09/22 4090         Coumadin Use Last 7 Days:  no  Antiplatelet drug therapy use last 7 days: no  Other anticoagulant use last 7 days: no       Pre-Sedation Documentation and Exam:   I have personally completed a history, physical exam & review of systems for this patient (see notes). Mallampati Airway Assessment:  Please see CRNA note for details. Prior History of Anesthesia Complications:   Please see CRNA note for details. ASA Classification:  Please see CRNA note for details. Sedation/ Anesthesia Plan:   Patient was given anesthesia by CRNA Ms. John Harden from anesthesiology dept. Please see detailed note by CRNA for details.     Sharon Yeboah MD MD, [facility]  2/9/2022, 1:20 PM  Electronically signed by Sharon Yeboah MD on 2/9/2022 at 1:20 PM

## 2022-02-09 NOTE — PROGRESS NOTES
ID Progress Note    Patient - Spencer Worrell,  Age - 49 y.o.    - 1970      Room Number - 9K-42/514-A   MRN -  [id number]   Acct # - [de-identified]  Date of Admission -  2022 10:33 AM    SUBJECTIVE:   He had BAL with biopsy. he has no new issues. OBJECTIVE   VITALS    oral temperature is 97.9 °F (36.6 °C). His blood pressure is 131/70 and his pulse is 107. His respiration is 18 and oxygen saturation is 97%. Wt Readings from Last 3 Encounters:   22 128 lb 12.8 oz (58.4 kg)   22 165 lb (74.8 kg)   21 174 lb (78.9 kg)       I/O (24 Hours)    Intake/Output Summary (Last 24 hours) at 2022 1527  Last data filed at 2022 1513  Gross per 24 hour   Intake 810 ml   Output 2250 ml   Net -1440 ml       General Appearance  Awake, alert, oriented,    HEENT - normocephalic, atraumatic, pink conjunctiva,  anicteric sclera. Neck - Supple, no mass. Lungs -  Bilateral   air entry, no rhonchi, no wheeze. Cardiovascular - Heart sounds are normal.    Abdomen - soft, not distended, nontender, no organomegally,  Neurologic - AOx3. Skin - No bruising or bleeding.   Extremities - No edema, no cyanosis, clubbing     MEDICATIONS:      insulin lispro  0-18 Units SubCUTAneous TID WC    insulin lispro  0-9 Units SubCUTAneous Nightly    insulin glargine  5 Units SubCUTAneous Nightly    amLODIPine  10 mg Oral Daily    atorvastatin  40 mg Oral Nightly    [Held by provider] hydroCHLOROthiazide  25 mg Oral Daily    cetirizine  10 mg Oral Daily    sodium chloride flush  5-40 mL IntraVENous 2 times per day    ceFAZolin  2,000 mg IntraVENous Q8H      lactated ringers 100 mL/hr at 22 1412    sodium chloride      dextrose       morphine **OR** morphine, oxyCODONE-acetaminophen **OR** oxyCODONE-acetaminophen, fluticasone, sodium chloride flush, sodium chloride, ondansetron **OR** ondansetron, polyethylene glycol, acetaminophen **OR** acetaminophen, glucose, dextrose, glucagon (rDNA), dextrose  LABS:   CBC   Recent Labs     02/09/22 0418   WBC 8.7   HGB 12.2*   HCT 39.4*   MCV 77.9*   *     BMP:   Recent Labs     02/09/22 0418      K 4.9   CL 99   CO2 26   BUN 30*   CREATININE 1.3*   GLUCOSE 234*   MG 2.0     LIVER PROFILE No results for input(s): AST, ALT, LIPASE, BILIDIR, BILITOT, ALKPHOS in the last 72 hours. Invalid input(s): AMYLASE,  ALB  INR   Recent Labs     02/09/22 0418   INR 1.00     PTT No results found for: APTT    CULTURES:   UA: No results for input(s): SPECGRAV, PHUR, COLORU, CLARITYU, MUCUS, PROTEINU, BLOODU, RBCUA, WBCUA, BACTERIA, NITRU, GLUCOSEU, BILIRUBINUR, UROBILINOGEN, KETUA, LABCAST, LABCASTTY, AMORPHOS in the last 72 hours. Invalid input(s): CRYSTALS  Micro:   Lab Results   Component Value Date    BC No growth-preliminary No growth  01/28/2022    BC No growth-preliminary No growth  01/28/2022         Patient active problem list     Patient Active Problem List   Diagnosis Code    GERD (gastroesophageal reflux disease) K21.9    Hyperlipidemia E78.5    Hypertension I10    Pneumonia J18.9    [facility] J86.9       Assessment and Plan   Left upper lung mass,cavitatry lesion. He had bronchoscopy  Right sided pleural effusion - resolved, per CT chest 2/7/22. Patient had thoracentesis 1/28/22 and culture grew Strep hominis. Will change to oral antibiotic  Recent COVID-19 infection requiring hospitalization. Will transition to oral   Will plan discharge home tomorrow.     Shimon Raphael MD

## 2022-02-09 NOTE — ANESTHESIA PRE PROCEDURE
oxyCODONE-acetaminophen (PERCOCET) 5-325 MG per tablet 2 tablet  2 tablet Oral Q4H PRN Render Ivelisse, APRN - CNP        insulin lispro (HUMALOG) injection vial 0-18 Units  0-18 Units SubCUTAneous TID WC Travis Pedersen, APRN - CNP        insulin lispro (HUMALOG) injection vial 0-9 Units  0-9 Units SubCUTAneous Nightly Travis Pedersen, APRN - CNP        lactated ringers infusion   IntraVENous Continuous Abida Taylor  mL/hr at 02/09/22 0617 New Bag at 02/09/22 0617    insulin glargine (LANTUS) injection vial 5 Units  5 Units SubCUTAneous Nightly HIRAM Stein        amLODIPine (NORVASC) tablet 10 mg  10 mg Oral Daily Abida Taylor MD   10 mg at 02/08/22 8617    atorvastatin (LIPITOR) tablet 40 mg  40 mg Oral Nightly Abida Taylor MD   40 mg at 02/08/22 2107    fluticasone (FLONASE) 50 MCG/ACT nasal spray 1 spray  1 spray Each Nostril Daily PRN Abida Taylor MD        [Held by provider] hydroCHLOROthiazide (HYDRODIURIL) tablet 25 mg  25 mg Oral Daily Abida Taylor MD   25 mg at 02/08/22 9096    cetirizine (ZYRTEC) tablet 10 mg  10 mg Oral Daily Abida Taylor MD   10 mg at 02/08/22 4925    sodium chloride flush 0.9 % injection 5-40 mL  5-40 mL IntraVENous 2 times per day Abida Taylor MD   10 mL at 02/08/22 3888    sodium chloride flush 0.9 % injection 5-40 mL  5-40 mL IntraVENous PRN Abida Taylor MD        0.9 % sodium chloride infusion  25 mL IntraVENous PRN Abida Taylor MD        ondansetron (ZOFRAN-ODT) disintegrating tablet 4 mg  4 mg Oral Q8H PRN Abida Taylor MD        Or    [facility]ron [facility] injection 4 mg  4 mg IntraVENous Q6H PRN Abida Taylor MD        polyethylene glycol (GLYCOLAX) packet 17 g  17 g Oral Daily PRN Abida Taylor MD        acetaminophen (TYLENOL) tablet 650 mg  650 mg Oral Q6H PRN Abida Taylor MD   650 mg at 02/08/22 1848    Or    acetaminophen (TYLENOL) suppository 650 mg  650 mg Rectal Q6H PRN Carey Gallardo MD        glucose (GLUTOSE) 40 % oral gel 15 g  15 g Oral PRN Carey Gallardo MD        dextrose 50 % IV solution  12.5 g IntraVENous PRN Carey Gallardo MD        glucagon (rDNA) injection 1 mg  1 mg IntraMUSCular PRN Carey Gallardo MD        dextrose 5 % solution  100 mL/hr IntraVENous PRN Carey Gallardo MD        ceFAZolin (ANCEF) 2000 mg in dextrose 5 % 50 mL IVPB  2,000 mg IntraVENous Cecil Hawk MD   Stopped at 02/09/22 6588       Allergies: Allergies   Allergen Reactions    Aspirin Hives and Rash    Motrin [Ibuprofen Micronized] Hives and Rash       Problem List:    Patient Active Problem List   Diagnosis Code    GERD (gastroesophageal reflux disease) K21.9    Hyperlipidemia E78.5    Hypertension I10    Pneumonia J18.9    Empyema ([address] J86.9    Mass of upper lobe of left lung R91.8       Past Medical History:        Diagnosis Date    GERD (gastroesophageal reflux disease)     Headache(784.0)     Hyperlipidemia     Hypertension        Past Surgical History:        Procedure Laterality Date    MANDIBLE FRACTURE SURGERY         Social History:    Social History     Tobacco Use    Smoking status: Current Every Day Smoker     Packs/day: 0.20     Years: 28.00     Pack years: 5.60     Types: Cigarettes    Smokeless tobacco: Never Used   Substance Use Topics    Alcohol use:  Yes     Alcohol/week: 0.0 standard drinks     Comment: Spends $39. a day on beer (49 ozs) pt states he quit a month ago, pt denies                                 Ready to quit: Not Answered  Counseling given: Not Answered      Vital Signs (Current):   Vitals:    02/08/22 2030 02/09/22 0415 02/09/22 0815 02/09/22 1106   BP: 119/74 138/78 132/82 (!) 143/80   Pulse: 93 80 85 77   Resp: 18 16 18 18   Temp: 36.7 °C (98 °F) 36.6 °C (97.9 °F) 36.6 °C (97.8 °F)    TempSrc: Oral Oral Oral    SpO2: 98% 99% 98% 98%                                              BP Readings from Last 3 Encounters:   02/09/22 (!) 143/80   02/07/22 138/76   02/02/22 (!) 146/77       NPO Status: Time of last liquid consumption: 2359                        Time of last solid consumption: 2359                        Date of last liquid consumption: 02/08/22                        Date of last solid food consumption: 02/08/22    BMI:   Wt Readings from Last 3 Encounters:   02/07/22 128 lb 12.8 oz (58.4 kg)   01/27/22 165 lb (74.8 kg)   09/28/21 174 lb (78.9 kg)     There is no height or weight on file to calculate BMI.    CBC:   Lab Results   Component Value Date    WBC 8.7 02/09/2022    RBC 5.06 02/09/2022    HGB 12.2 02/09/2022    HCT 39.4 02/09/2022    MCV 77.9 02/09/2022    RDW 16.1 03/11/2018     02/09/2022       CMP:   Lab Results   Component Value Date     02/09/2022    K 4.9 02/09/2022    CL 99 02/09/2022    CO2 26 02/09/2022    BUN 30 02/09/2022    CREATININE 1.3 02/09/2022    LABGLOM 71 02/09/2022    GLUCOSE 234 02/09/2022    GLUCOSE 113 02/17/2015    PROT 7.1 02/07/2022    CALCIUM 8.8 02/09/2022    BILITOT 0.6 01/27/2022    ALKPHOS 178 01/27/2022    AST 22 01/27/2022    ALT 19 01/27/2022       POC Tests:   Recent Labs     02/09/22  0175   POCGLU 203*       Coags:   Lab Results   Component Value Date    INR 1.00 02/09/2022       HCG (If Applicable): No results found for: PREGTESTUR, PREGSERUM, HCG, HCGQUANT     ABGs: No results found for: PHART, PO2ART, MRI5NMP, EWQ6OFD, BEART, W8OCQCTL     Type & Screen (If Applicable):  No results found for: LABABO, LABRH    Drug/Infectious Status (If Applicable):  No results found for: HIV, HEPCAB    COVID-19 Screening (If Applicable):   Lab Results   Component Value Date    COVID19 DETECTED 01/27/2022           Anesthesia Evaluation  Patient summary reviewed and Nursing notes reviewed no history of anesthetic complications:   Airway: Mallampati: III  TM distance: >3 FB   Neck ROM: full  Mouth opening: > = 3 FB Dental:          Pulmonary:normal exam    (+) pneumonia: resolved,                             Cardiovascular:  Exercise tolerance: good (>4 METS),   (+) hypertension:, hyperlipidemia                  Neuro/Psych:   (+) headaches:,             GI/Hepatic/Renal:   (+) GERD: well controlled,           Endo/Other:                     Abdominal:             Vascular: negative vascular ROS. Other Findings:             Anesthesia Plan      general     ASA 3       Induction: intravenous. MIPS: Postoperative opioids intended, Prophylactic antiemetics administered and Postoperative trial extubation. Anesthetic plan and risks discussed with patient. Plan discussed with attending.                   BARBARA Valladares - CRNA   2/9/2022

## 2022-02-09 NOTE — ANESTHESIA POSTPROCEDURE EVALUATION
Department of Anesthesiology  Postprocedure Note    Patient: Ta Angeles  MRN: [id number]  YOB: 1970  Date of evaluation: 2/9/2022  Time:  1:29 PM     Procedure Summary     Date: 02/09/22 Room / Location: [facility] / [address] [place] [address]    Anesthesia Start: 4128 Anesthesia Stop: 9717    Procedure: BRONCHOSCOPY WITH BIOPSY UNDER FLUORO (N/A ) Diagnosis: (LEFT CAVITY LOBE PNEUMONIA)    Surgeons: Eliz Cadena MD Responsible Provider: Helena Christianson DO    Anesthesia Type: general ASA Status: 3          Anesthesia Type: general    Ravi Phase I: Ravi Score: 10    Ravi Phase II:      Last vitals: Reviewed and per EMR flowsheets. Anesthesia Post Evaluation    Comments: Tay Pope 61  POST-ANESTHESIA NOTE       Name:  Ta Angeles                                         Age:  49 y.o. MRN:  [id number]      Last Vitals:  BP (!) 148/74   Pulse 107   Temp 98.2 °F (36.8 °C) (Temporal)   Resp 26   SpO2 94%   Patient Vitals in the past 4 hrs:  02/09/22 1320, BP:(!) 148/74, Pulse:107, Resp:26, SpO2:94 %  02/09/22 1315, BP:(!) 140/93, Pulse:109, Resp:26, SpO2:95 %  02/09/22 1309, BP:(!) 140/94, Temp:98.2 °F (36.8 °C), Temp src:Temporal, Pulse:108, Resp:26, SpO2:98 %  02/09/22 1106, BP:(!) 143/80, Pulse:77, Resp:18, SpO2:98 %    Level of Consciousness:  Awake    Respiratory:  Stable    Oxygen Saturation:  Stable    Cardiovascular:  Stable    Hydration:  Adequate    PONV:  Stable    Post-op Pain:  Adequate analgesia    Post-op Assessment:  No apparent anesthetic complications    Additional Follow-Up / Treatment / Comment:  None    Estuardo Snow.  [address] [facility]  February 9, 2022   1:29 PM

## 2022-02-09 NOTE — PROGRESS NOTES
Hospitalist Progress Note    Patient:  Piotr Solomon      Unit/Bed:5K-09/009-A    YOB: 1970    MRN: [id number]       Acct: [de-identified]     PCP: BARBARA Hughes CNP    Date of Admission: 2/7/2022    Assessment/Plan:    1. Right empyema   -CT chest (2/7) notable for resolution of moderate right pleural effusion. Subsegmental atelectasis in RML, RUL, RLL.   -Patient had thoracentesis 1/28/2022, and culture grew strep hominis   - Patient n.p.o., continue IV fluids   - Infectious disease consulted; continue Ancef (initiated 2/7) for 14-day course. 1. Left upper lobe nodule/mass, unintentional weight loss, now cavitary lesion:   -CT chest (2/7) notable for 1.5 x 0.9 cm cavitary nodule in the left upper lobe. 3 mm intrafissural pulmonary nodule. 6 mm subpleural right middle lobe pulmonary nodule. 3 mm subpleural right middle lobe pulmonary nodule. New cavitation from previous CT chest on 1/27. Concern for possible staph pneumonia, tuberculosis, malignancy. - Airborne precautions   - Infectious disease consulted and following   - Pulmonology consulted; Plan for bronchoscopy today   -CT needle biopsy pending    2. DONALD   -Creatinine 1.3 (2/9); downtrending from 1.7 upon admission (2/7)   -Likely secondary to dehydration from #6.   -Potassium 4.9 (2/9). Mildly elevated from 4.4 upon admission (2/7)   -Continue to hold hydrochlorothiazide, and lisinopril. 3. Recent COVID-19 infection   -Tested +1/27.     - On room air satting 99%. -Completed course of Decadron while at home from previous admission on 1/27/22.      4. Essential hypertension   -Blood pressures well controlled, intermittent hypertensive pressures. May be related to pain.   -Continue amlodipine   -Hold hydrochlorothiazide and lisinopril as noted in #2    5. Hyperlipidemia   - Continue home Lipitor    6.  Type 2 DM   - Patient reported that he was previously told to be a diabetic in 2008, but has not been on medications since.   -Hemoglobin A1c 7.8 (2/7)   - Remains to be hyperglycemic with blood sugar ranging from .   -Glucose on renal panel (2/9) is 234   -Start high-dose sliding scale insulin, continue Lantus          Disposition:    [x] Home       [] TCU       [] Rehab       [] Psych       [] SNF       [] [place]       [] Other-    Chief Complaint: Sent from clinic    Hospital Course: Per HPI documented 2/7/22, \"This is a 67-year-old gentleman with a past medical history of hypertension hyperlipidemia and recent COVID-19 infection. He was recently admitted for his COVID-19 infection for where he presented last week with right-sided chest pain. During his stay he was treated with baricitinib and Decadron. He was incidentally found to have a left upper lobe pulmonary mass measuring 3.2 x 1.5 cm. He also had a right pleural effusion and underwent thoracentesis during his stay. Fluid studies at that time did not grow anything on culture, however, while he was following up with pulmonology this week for the left upper lobe mass they reviewed his chart and found that the culture did eventually grow staph hominis. Of note patient was also discharged with Levaquin last admission. He was sent from clinic for further work-up and treatment of empyema. At this time patient feels improved from discharge he is still having some discomfort of in the right side of his chest.  He denies any fevers or chills. He denies any lightheadedness, dizziness, hemoptysis, night sweats, dyspnea on exertion. He states he otherwise feels quite well. Still has a little bit of residual cough from his COVID-19 infection. Denies any diarrhea or dysuria. He endorses losing at least 30 pounds over the last 6 months unintentionally. He states he was told he was a diabetic in the past but that was 2008 and since then has not been on any medications.   He denies any headaches or vision changes\"     2/9/22: Patient and rhythm with normal S1/S2 without murmurs, rubs or gallops. Abdomen: Soft, non-tender in all quadrants, non-distended with normal bowel sounds. Musculoskeletal: passive and active ROM x 4 extremities. Skin: Skin color, texture, turgor normal.  No rashes or lesions. Neurologic:  Neurovascularly intact without any focal sensory/motor deficits. Cranial nerves: II-XII intact, grossly non-focal.  Psychiatric: Alert and oriented, thought content appropriate, normal insight  Capillary Refill: Brisk,< 3 seconds   Peripheral Pulses: +2 palpable, equal bilaterally       Labs:   Recent Labs     02/07/22 1215 02/08/22  0356 02/09/22 0418   WBC 9.7 9.7 8.7   HGB 13.7* 12.6* 12.2*   HCT 42.6 39.6* 39.4*   * 638* 601*     Recent Labs     02/07/22 1215 02/08/22  0356 02/09/22  0418   * 132* 135   K 4.7 4.4 4.9   CL 93* 95* 99   CO2 23 26 26   BUN 36* 36* 30*   CREATININE 1.7* 1.5* 1.3*   CALCIUM 8.9 8.6 8.8     No results for input(s): AST, ALT, BILIDIR, BILITOT, ALKPHOS in the last 72 hours. Recent Labs     02/07/22 1215 02/08/22 0356 02/09/22 0418   INR 1.12 1.02 1.00     No results for input(s): [place] in the last 72 hours. Urinalysis:      Lab Results   Component Value Date    NITRU NEGATIVE 09/28/2021    BLOODU NEGATIVE 09/28/2021    GLUCOSEU NEGATIVE 09/28/2021       Radiology:  CT CHEST WO CONTRAST    Result Date: 2/7/2022  PROCEDURE: CT CHEST WO CONTRAST CLINICAL INFORMATION: right side empyema: need follow  up CT COMPARISON: CT angiography chest 3/27/8802 TECHNIQUE: Helical CT acquisition of the chest was performed without intravenous contrast. Multiplanar reformats are provided. All CT scans at this facility use dose modulation, iterative reconstruction, and/or weight based dosing when appropriate to reduce the radiation dose to as low as reasonably achievable. FINDINGS: The noncontrast CT limits the evaluation for solid organ pathology, vascular pathology, and lymphadenopathy. The moderate right pleural effusion has resolved. There is now subsegmental platelike atelectasis in the right middle lobe and in the right upper lobe and right lower lobe. Mild emphysematous changes are seen. There is a 1.5 x 0.9 cm cavitary nodule in the left upper lobe in the region of the previously seen left upper lobe masslike abnormality. A 3 mm intrafissural pulmonary nodule (series 2, image 25). A 6 mm subpleural right middle lobe pulmonary nodule (series 2, image 36). A 3 mm subpleural right middle lobe pulmonary nodule (image 36). No focal pulmonary consolidation. No large pulmonary mass. Central airway is patent. No pleural effusions. No significant pericardial effusion. The heart is not enlarged. Ascending thoracic aorta is not dilated. The main pulmonary artery is not dilated. No significantly enlarged mediastinal or  axillary lymph node. The thyroid is not enlarged. A 2.3 x 0.8 cm subcutaneous lipoma is seen in the right lateral chest wall. Limited evaluation below the diaphragm show a 2.8 cm cyst in the superior pole of the right kidney. Stool is present in the colon. . Bones: No aggressive bony lesion. 1. Interval resolution of the right pleural effusion. 2. A 1.5 x 0.9 cm cavitary pulmonary nodule is seen in the left upper lobe replacing the left upper lobe masslike opacity seen on the prior exam. This is still worrisome for malignancy. 3. Other pulmonary nodules measuring 6 mm or less are seen as described above. 4. Other findings as described above. **This report has been created using voice recognition software. It may contain minor errors which are inherent in voice recognition technology. ** Final report electronically signed by Dr Aury Jimenes on 2/7/2022 3:59 PM    XR CHEST PORTABLE    Result Date: 2/7/2022  PROCEDURE: XR CHEST PORTABLE CLINICAL INFORMATION: Right-sided empyema. COMPARISON: Chest x-ray 2/1/2022. TECHNIQUE: AP portable chest radiograph performed.  FINDINGS: Lines/tubes: None. Heart/mediastinum: The heart size and mediastinal contours are stable. Lungs: The right pleural effusion has decreased in volume. Improved aeration is noted in the right lower lobe. The left lung is clear. No pneumothorax is observed. Bones: The visualized skeletal structures appear intact. The right pleural effusion has decreased in volume. Improved aeration is noted of the right lung base. No pneumothorax is observed. **This report has been created using voice recognition software. It may contain minor errors which are inherent in voice recognition technology. ** Final report electronically signed by Dr Isidra Bustamante on 2/7/2022 1:02 PM      DVT prophylaxis: [] Lovenox                                 [x] SCDs                                 [] SQ Heparin                                 [] Encourage ambulation           [] Already on Anticoagulation     Code Status: Full Code    PT/OT Eval Status: No    Tele:   [] yes             [x] no    Active Hospital Problems    Diagnosis Date Noted    Mass of upper lobe of left lung [R91.8]     Empyema ([address] [J86.9] 02/07/2022       Electronically signed by BARBARA Patel CNP on 2/9/2022 at 7:43 AM

## 2022-02-09 NOTE — OP NOTE
Bronchoscopy procedure note under general anesthesia    Patient: Phillip Thompson  : 1970      Operation: Flexible diagnostic fiberoptic bronchoscopy with fluoroscopy. During procedure following procedures were performed:  Bronchioalveolar lavage: obtained from Left upper lobe anterior segment. Bronch washings obtained from left tracheobronchial tree including left upper, lingular and lower lobes. Trans bronchial lung biopsies were performed by using biopsy forceps from Left upper lobe anterior segment under fluroscopy guidance. Date of Operation: 2022    Indication for procedure: 3.2 cm x 1.5 cm cavitary pulmonary nodule found incidentally on CTA - ? Etiology. Need to obtain lower respiratory tract specimen to rule out active pulmonary tuberculosis infection per Dr. Levonia Koyanagi from infectious disease service. Pre operative diagnoses:   3.2 cm x 1.5 cm cavitary pulmonary nodule found incidentally on CTA   Right side Empyema growing Staph hominis s/p thoracentesis  Recent COVID-19 infection with superimposed bacterial pneumonia  Current smoker (~0.25 PPD, 40 year history    Post operative diagnoses:   3.2 cm x 1.5 cm cavitary pulmonary nodule found incidentally on CTA   Right side Empyema growing Staph hominis s/p thoracentesis  Recent COVID-19 infection with superimposed bacterial pneumonia  Current smoker (~0.25 PPD, 40 year history    Surgeon: Layla Fletcher MD    Consent: Phillip Thompson is a 49 y.o. male . The risks, benefits, complications, treatment options and expected outcomes were discussed with the patient. Consent obtained from the patient after explaining the risks and complications of the procedure.  The possibilities of reaction to medication, pulmonary aspiration, perforation of a viscus, development of pneumothorax with requirement of chest tube placement,air way bleeding, failure to diagnose a condition and creating a complication leading to respiratory failure requiring mechanical ventilation, transfusion or operation were discussed with the patient who freely signed the consent. The patient was counseled at length about the risks of sienna Covid-19 during their perioperative period and any recovery window from their procedure. The patient was made aware that sienna Covid-19  may worsen their prognosis for recovering from their procedure  and lend to a higher morbidity and/or mortality risk. All material risks, benefits, and reasonable alternatives including postponing the procedure were discussed. The patient does wish to proceed with the procedure at this time. Anesthesia: Patient was given general endotracheal anesthesia by CRNA Ms. Mario Sweeney from anesthesiology dept. Please see anesthesiologist's note for details. Description of Procedure: The patient was taken to endoscopy suite, identified as [facility] and the procedure verified as Flexible Fiberoptic Bronchoscopy. A Time Out was held and the above information confirmed. After the induction of general  anesthesia by the anesthesiologist, the patient was placed in appropriate position and the Flexible Fibrooptic bronchoscope was advanced through the endotracheal tube after placing the Swivel adopter. The lower end of endotracheal tube was found to be in right main stem. It was pulled out and adjusted to keep in appropriate position. The scope was advanced into the trachea. The nacho is sharp with no growths. Careful inspection of the tracheal lumen below the lower end of endotracheal tube was accomplished and found to have no growths. The scope was  advanced into the right main bronchus and then into the Right upper lobe, Right middle lobe, and Right lower lobe bronchi and segmental bronchi. The scope was sequentially passed into the Left main and then Left upper and lower bronchi and segmental bronchi.      Bronchioalveolar lavage: Bronchioalveolar lavage was performed from Left upper lobe anterior segment. A good amount of Bronchioalveolar lavage I.e ~50 ml was obtained after instilling 120ml of sterile 0.9NS. Bronchioalveolar lavage was sent to the laboratory for further analysis. Bronchial washings: Bronchial washings were performed form left tracheobronchial tree including left upper, lingular and lower lobes. More than 25 ml of Bronchial washings were obtained and sent to the laboratory for further analysis. Trans bronchial lung biopsies: Trans bronchial lung biopsies were performed by using biopsy forceps from from Left upper lobe anterior segment. A total of 3 biopsies obtained and sent to the laboratory for further analysis. Endobronchial findings:   Trachea: Normal mucosa. April: Normal mucosa  Right main bronchus: Normal mucosa  Right upper lobe bronchus: Normal mucosa  Right Middle lobe bronchus: Normal mucosa  Right Lower lobe bronchus:Normal mucosa  Left main bronchus: Normal mucosa  Left upper lobe bronchus: Normal mucosa  Left lower lobe bronchus: Normal mucosa    No endobronchial lesions noted    Patient noted to have minimal oozing of blood from left upper lobe anterior segment after having transbronchial lung biopsy from left upper lobe anterior segment. It was effectively controlled by applying 2ml of topical diluted epinephrine. Hemostasis secured at the end of the procedure    The Patient was taken to the endoscopy recovery area in satisfactory condition. Estimated Blood Loss: Less than 10ml. Complications: None. Recommendation/Plan:  1. F/U on culturs results  2. F/U on cytology results  3. Follow transbronchial lung biopsy reports. 4. Follow post procedure portable chest Xray to R/o Pneumothorax. Follow up: Will follow as in patient. Attestation: I performed the procedure.     Rio Deluna MD  Electronically signed by Rio Deluna MD on 2/9/2022 at 1:09 PM

## 2022-02-09 NOTE — CARE COORDINATION
2/9/22, 10:39 AM EST    DISCHARGE ON GOING EVALUATION    5 Pat Young day: 2  Location: -09/009-A [facility] [J86.9[facility]   Procedure: 2/09/2022 Bronchoscopy with washings and biopsy. Barriers to Discharge: Pulmonology and ID following, IV fluids, Ancef q 8 hr., DM management, prn pain medications and Zofran, daily BMP, Magnesium, and CBC, carb choice diet, airborne isolation, SCD's, up with assistance. PCP: ANGEL LUIS [facility], APRN - CNP  Readmission Risk Score: 11.6 ( )%  Patient Goals/Plan/Treatment Preferences: Per primary RN, Dr. Audra Boast states oral antibiotics at discharge. [facility] and [place] notified. Nasir Brewer is from home with his wife. Information on assistance with meals, [employer], and [place] delivered to him.

## 2022-02-09 NOTE — PLAN OF CARE
Problem: SAFETY  Goal: Free from accidental physical injury  Outcome: Ongoing  Note: Standard safety measures in place. Education provided to promote patient safety. Problem: Skin Integrity:  Goal: Will show no infection signs and symptoms  Description: Will show no infection signs and symptoms  Outcome: Ongoing  Note: Patient afebrile. Vital signs WNL. Problem: DISCHARGE BARRIERS  Goal: Patient's continuum of care needs are met  Outcome: Ongoing  Note: Discharge planning in process. Education ongoing. Problem: SKIN INTEGRITY  Goal: Skin integrity is maintained or improved  Outcome: Ongoing  Note: Skin integrity measures in place to prevent breakdown. Patient frequently ambulating. Problem: PAIN  Goal: Patient's pain/discomfort is manageable  Outcome: Ongoing  Note: Pain management measures discussed. Patient verbalizes understanding regarding nonpharm measures in addition to pharmalogical measures. Care plan reviewed with patient. Patient verbalize understanding of the plan of care and contribute to goal setting.

## 2022-02-09 NOTE — PROGRESS NOTES
1310: Pt arrives to pacu awake. Pt on room air, persistent  productive cough with bloody phlegm noted. Suction provided to patient. VSS  1319: Duoneb administered to patient   1320: Radiology at bedside. 1324: Dr. Johann Marr at bedside to update patient and viewed chest x-ray. No concerns voiced or new orders given. 1330: Pt provided with ice chips per request, tolerated well. VSS  1340: Pt meets criteria for discharge from pacu, awaiting transport.  Provided report to 5K RN.  8456: Pt transported back to

## 2022-02-09 NOTE — PROGRESS NOTES
Scope  used. Pictures taken. Washings and BAL completed. Brushings completed. Specimens labeled and sent to lab. *** biopsies taken. ** jars labeled and sent to lab. Fluro used. Procedure completed. Patient tolerated well.

## 2022-02-09 NOTE — PROGRESS NOTES
Patient in endo for bronchoscopy. Scope  used. Pictures taken. Washings and BAL completed. Christiano Rich Specimens labeled and sent to lab. Bopsies taken. 3  jars labeled and sent to lab. Fluro used. Procedure completed. Patient tolerated well.

## 2022-02-10 ENCOUNTER — TELEPHONE (OUTPATIENT)
Dept: PULMONOLOGY | Age: 52
End: 2022-02-10

## 2022-02-10 VITALS
OXYGEN SATURATION: 100 % | BODY MASS INDEX: 20.17 KG/M2 | RESPIRATION RATE: 16 BRPM | SYSTOLIC BLOOD PRESSURE: 162 MMHG | HEART RATE: 82 BPM | TEMPERATURE: 98.5 F | HEIGHT: 67 IN | DIASTOLIC BLOOD PRESSURE: 85 MMHG

## 2022-02-10 PROBLEM — E11.9 TYPE 2 DIABETES MELLITUS WITHOUT COMPLICATION, WITHOUT LONG-TERM CURRENT USE OF INSULIN (HCC): Status: ACTIVE | Noted: 2022-02-10

## 2022-02-10 LAB
ANION GAP SERPL CALCULATED.3IONS-SCNC: 11 MEQ/L (ref 8–16)
BAL CHARACTER: CLEAR
BAL COLLECTION SITE: ABNORMAL
BAL COLOR: COLORLESS
BUN BLDV-MCNC: 21 MG/DL (ref 7–22)
CALCIUM SERPL-MCNC: 8.7 MG/DL (ref 8.5–10.5)
CHLORIDE BLD-SCNC: 101 MEQ/L (ref 98–111)
CILIATED/EPITHELIAL CELLS BAL: 3 % (ref 0–5)
CO2: 24 MEQ/L (ref 23–33)
CREAT SERPL-MCNC: 1.1 MG/DL (ref 0.4–1.2)
ERYTHROCYTE [DISTWIDTH] IN BLOOD BY AUTOMATED COUNT: 15.7 % (ref 11.5–14.5)
ERYTHROCYTE [DISTWIDTH] IN BLOOD BY AUTOMATED COUNT: 44.6 FL (ref 35–45)
GFR SERPL CREATININE-BSD FRML MDRD: 85 ML/MIN/1.73M2
GLUCOSE BLD-MCNC: 178 MG/DL (ref 70–108)
GLUCOSE BLD-MCNC: 253 MG/DL (ref 70–108)
GLUCOSE BLD-MCNC: 254 MG/DL (ref 70–108)
HCT VFR BLD CALC: 37.1 % (ref 42–52)
HEMOGLOBIN: 11.4 GM/DL (ref 14–18)
INR BLD: 1 (ref 0.85–1.13)
LYMPHOCYTES, BAL: 5 % (ref 10–15)
MACROPHAGE/MONOCYTE BAL: 89 % (ref 86–100)
MAGNESIUM: 2 MG/DL (ref 1.6–2.4)
MCH RBC QN AUTO: 24.4 PG (ref 26–33)
MCHC RBC AUTO-ENTMCNC: 30.7 GM/DL (ref 32.2–35.5)
MCV RBC AUTO: 79.4 FL (ref 80–94)
PATHOLOGIST REVIEW: ABNORMAL
PLATELET # BLD: 536 THOU/MM3 (ref 130–400)
PMV BLD AUTO: 9 FL (ref 9.4–12.4)
POTASSIUM REFLEX MAGNESIUM: 4.8 MEQ/L (ref 3.5–5.2)
PROCALCITONIN: 0.06 NG/ML (ref 0.01–0.09)
RBC # BLD: 4.67 MILL/MM3 (ref 4.7–6.1)
RBC BAL: 306 /CUMM
SEGMENTED NEUTROPHILS, BAL: 3 % (ref 0–3)
SODIUM BLD-SCNC: 136 MEQ/L (ref 135–145)
TOTAL NUCLEATED CELLS BAL: 767 /CUMM
TOTAL VOLUME RECEIVED BAL: 60 ML
WBC # BLD: 10.8 THOU/MM3 (ref 4.8–10.8)

## 2022-02-10 PROCEDURE — 80048 BASIC METABOLIC PNL TOTAL CA: CPT

## 2022-02-10 PROCEDURE — 85610 PROTHROMBIN TIME: CPT

## 2022-02-10 PROCEDURE — 83735 ASSAY OF MAGNESIUM: CPT

## 2022-02-10 PROCEDURE — 84145 PROCALCITONIN (PCT): CPT

## 2022-02-10 PROCEDURE — 6370000000 HC RX 637 (ALT 250 FOR IP)

## 2022-02-10 PROCEDURE — 6370000000 HC RX 637 (ALT 250 FOR IP): Performed by: NURSE PRACTITIONER

## 2022-02-10 PROCEDURE — 85027 COMPLETE CBC AUTOMATED: CPT

## 2022-02-10 PROCEDURE — 36415 COLL VENOUS BLD VENIPUNCTURE: CPT

## 2022-02-10 PROCEDURE — 6370000000 HC RX 637 (ALT 250 FOR IP): Performed by: INTERNAL MEDICINE

## 2022-02-10 PROCEDURE — 99232 SBSQ HOSP IP/OBS MODERATE 35: CPT | Performed by: INTERNAL MEDICINE

## 2022-02-10 PROCEDURE — 99232 SBSQ HOSP IP/OBS MODERATE 35: CPT

## 2022-02-10 PROCEDURE — 82948 REAGENT STRIP/BLOOD GLUCOSE: CPT

## 2022-02-10 RX ORDER — GLIPIZIDE 5 MG/1
5 TABLET ORAL 2 TIMES DAILY
Qty: 60 TABLET | Refills: 1 | Status: SHIPPED | OUTPATIENT
Start: 2022-02-10

## 2022-02-10 RX ORDER — CEPHALEXIN 500 MG/1
500 CAPSULE ORAL 4 TIMES DAILY
Qty: 56 CAPSULE | Refills: 0 | Status: SHIPPED | OUTPATIENT
Start: 2022-02-10 | End: 2022-02-24

## 2022-02-10 RX ORDER — LANCETS 30 GAUGE
1 EACH MISCELLANEOUS 2 TIMES DAILY
Qty: 300 EACH | Refills: 1 | Status: SHIPPED | OUTPATIENT
Start: 2022-02-10

## 2022-02-10 RX ORDER — CEPHALEXIN 500 MG/1
500 CAPSULE ORAL 4 TIMES DAILY
Qty: 56 CAPSULE | Refills: 0 | Status: SHIPPED | OUTPATIENT
Start: 2022-02-10 | End: 2022-02-10

## 2022-02-10 RX ORDER — GLUCOSAMINE HCL/CHONDROITIN SU 500-400 MG
CAPSULE ORAL
Qty: 300 STRIP | Refills: 0 | Status: SHIPPED | OUTPATIENT
Start: 2022-02-10

## 2022-02-10 RX ORDER — BLOOD-GLUCOSE METER
1 KIT MISCELLANEOUS DAILY
Qty: 1 KIT | Refills: 0 | Status: SHIPPED | OUTPATIENT
Start: 2022-02-10

## 2022-02-10 RX ADMIN — CEPHALEXIN 500 MG: 500 CAPSULE ORAL at 00:13

## 2022-02-10 RX ADMIN — CETIRIZINE HYDROCHLORIDE 10 MG: 10 TABLET, FILM COATED ORAL at 08:18

## 2022-02-10 RX ADMIN — INSULIN LISPRO 9 UNITS: 100 INJECTION, SOLUTION INTRAVENOUS; SUBCUTANEOUS at 08:10

## 2022-02-10 RX ADMIN — CEPHALEXIN 500 MG: 500 CAPSULE ORAL at 12:51

## 2022-02-10 RX ADMIN — OXYCODONE AND ACETAMINOPHEN 1 TABLET: 5; 325 TABLET ORAL at 08:18

## 2022-02-10 RX ADMIN — OXYCODONE AND ACETAMINOPHEN 1 TABLET: 5; 325 TABLET ORAL at 12:54

## 2022-02-10 RX ADMIN — AMLODIPINE BESYLATE 10 MG: 10 TABLET ORAL at 08:18

## 2022-02-10 RX ADMIN — CEPHALEXIN 500 MG: 500 CAPSULE ORAL at 05:57

## 2022-02-10 ASSESSMENT — PAIN DESCRIPTION - FREQUENCY: FREQUENCY: CONTINUOUS

## 2022-02-10 ASSESSMENT — PAIN - FUNCTIONAL ASSESSMENT: PAIN_FUNCTIONAL_ASSESSMENT: ACTIVITIES ARE NOT PREVENTED

## 2022-02-10 ASSESSMENT — PAIN DESCRIPTION - ONSET: ONSET: ON-GOING

## 2022-02-10 ASSESSMENT — PAIN SCALES - GENERAL
PAINLEVEL_OUTOF10: 6
PAINLEVEL_OUTOF10: 6
PAINLEVEL_OUTOF10: 5

## 2022-02-10 ASSESSMENT — PAIN DESCRIPTION - PAIN TYPE: TYPE: ACUTE PAIN

## 2022-02-10 ASSESSMENT — PAIN DESCRIPTION - DESCRIPTORS: DESCRIPTORS: SHARP

## 2022-02-10 ASSESSMENT — PAIN DESCRIPTION - PROGRESSION: CLINICAL_PROGRESSION: GRADUALLY IMPROVING

## 2022-02-10 ASSESSMENT — PAIN DESCRIPTION - ORIENTATION: ORIENTATION: RIGHT

## 2022-02-10 NOTE — PROGRESS NOTES
Hospitalist Progress Note    Patient:  Estelle Malhotra      Unit/Bed:5K-09/009-A    YOB: 1970    MRN: [id number]       Acct: [de-identified]     PCP: BARBARA Perez CNP    Date of Admission: 2/7/2022    Assessment/Plan:    1. Right empyema              -CT chest (2/7) notable for resolution of moderate right pleural effusion. Subsegmental atelectasis in RML, RUL, RLL.              -Patient had thoracentesis 1/28/2022, and culture grew strep hominis   -Received 2 g IV Ancef from 2/7 - 2/9. Transitioned to p.o. Keflex 2/9 by infectious disease. - Infectious disease consulted -> okay to discharge home        1. Left upper lobe nodule/mass, unintentional weight loss, now cavitary lesion:              -CT chest (2/7) notable for 1.5 x 0.9 cm cavitary nodule in the left upper lobe. 3 mm intrafissural pulmonary nodule. 6 mm subpleural right middle lobe pulmonary nodule. 3 mm subpleural right middle lobe pulmonary nodule. New cavitation from previous CT chest on 1/27. Concern for possible staph pneumonia, tuberculosis, malignancy.               - BAL performed 2/9/2022; cultures and biopsies obtained, some results pending. See microbiology.    - No concern for TB, discontinue airborne precautions per ID              - Pulmonology consulted and following; appreciate recommendations prior to discharge     2. DONALD; resolved              -Creatinine 1.1 (2/10); downtrended from 1.7 upon admission (2/7)              -Likely secondary to dehydration from #6.              -Potassium 4.8 (2/10). - Okay to start hydrochlorothiazide     3. Recent COVID-19 infection              -Tested +1/27 and was admitted to hospital. Completed course of Decadron while at home upon discharge. - On room air satting 100%.         - No residual effects noted.     4. Essential hypertension, controlled              -1 elevated blood pressure overnight, with a documented pain score of 7/10. Otherwise blood pressures within normal range. - Continue amlodipine              - Resolution of #2. Start hydrochlorothiazide. Patient states \"I don't take lisinopril anymore. \"   - Provide adequate pain control.     5. Hyperlipidemia              - Lipitor     6. Risk for malnutrition   -Dietitian consulted, place patient on diabetic diet with 3 carb choices (45 gm/meal); and daily Glucerna     7. Type 2 DM              - Patient reported that he was previously told to be a diabetic in 2008, but has not been on medications since. - Hemoglobin A1c 7.8 (2/7)              - Remains to be hyperglycemic with blood sugars elevated into 400s.  Blood sugar this morning was 112 on morning BMP, and poc glucose was 253.      - Continue high-dose sliding scale insulin, continue Lantus for now while in hospital.      Expected discharge date:  2/11/22    Disposition:    [x] Home       [] TCU       [] Rehab       [] Psych       [] SNF       [] [place]       [] Other-    Chief Complaint: Sent from clinic    Hospital Course:  Per HPI documented 2/7/22, \"This is a 81-year-old gentleman with a past medical history of hypertension hyperlipidemia and recent COVID-19 infection. Sabrina Duvall was recently admitted for his COVID-23 infection for where he presented last week with right-sided chest pain.  During his stay he was treated with baricitinib and Derik Baxter was incidentally found to have a left upper lobe pulmonary mass measuring 3.2 x 1.5 cm.  He also had a right pleural effusion and underwent thoracentesis during his stay.  Fluid studies at that time did not grow anything on culture, however, while he was following up with pulmonology this week for the left upper lobe mass they reviewed his chart and found that the culture did eventually grow staph hominis.  Of note patient was also discharged with Levaquin last admission. Sabrina Duvall was sent from clinic for further work-up and treatment of empyema. At this time patient feels improved from discharge he is still having some discomfort of in the right side of his chest.  He denies any fevers or chills.  He denies any lightheadedness, dizziness, hemoptysis, night sweats, dyspnea on exertion.  He states he otherwise feels quite well.  Still has a little bit of residual cough from his COVID-19 infection.  Denies any diarrhea or dysuria.  He endorses losing at least 30 pounds over the last 6 months unintentionally. Favian Altamirano states he was told he was a diabetic in the past but that was 2008 and since then has not been on any medications. Favian Altamirano denies any headaches or vision changes\"      2/9/22: Patient sitting up in bed, in no apparent distress. Patient remains afebrile with hemodynamically stable vital signs. Plan for patient to get bronchoscopy procedure done today. 2/10/22: Patient is afebrile, satting at 100% on room air, and has remained hemodynamically stable overnight. No acute events in the past 24 hours. Patient is very well-appearing. He is stating that he feels \"much better\". BAL with biopsy performed yesterday. Cultures/biopsies obtained and pending. Patient transitioned from IV Zosyn and vancomycin to oral ciprofloxacin per infectious disease. Patient blood sugars elevated into the 400s. Blood sugar this morning was 112 on morning BMP, and poc glucose was 253. Even patient hemoglobin A1c is 7.8 (2/7), discussed going home with oral Metformin and Glucotrol with follow up with PCP in 1 week for further management. Patient agreeable with this plan. Patient cleared by infectious disease to be discharged home. Awaiting pulmonary recommendations prior to discharging patient. Subjective (past 24 hours):     Reports right lateral chest wall pain rated 5/10. Pain is nonradiating. No aggravating or alleviating factors.   Denies shortness of breath, dyspnea on exertion, dizziness, lightheadedness, abdominal pain, nausea, vomiting, fever, chills. Reports last bowel movement was this morning, and he is not having any issues with urinating. Patient stated \"I want to get out of here today if possible. \"     Medications:  Reviewed    Infusion Medications    lactated ringers 100 mL/hr at 02/09/22 1413    sodium chloride      dextrose       Scheduled Medications    insulin lispro  0-18 Units SubCUTAneous TID WC    insulin lispro  0-9 Units SubCUTAneous Nightly    cephALEXin  500 mg Oral 4 times per day    insulin glargine  5 Units SubCUTAneous Nightly    amLODIPine  10 mg Oral Daily    atorvastatin  40 mg Oral Nightly    hydroCHLOROthiazide  25 mg Oral Daily    cetirizine  10 mg Oral Daily    sodium chloride flush  5-40 mL IntraVENous 2 times per day     PRN Meds: morphine **OR** morphine, oxyCODONE-acetaminophen **OR** oxyCODONE-acetaminophen, fluticasone, sodium chloride flush, sodium chloride, ondansetron **OR** ondansetron, polyethylene glycol, acetaminophen **OR** acetaminophen, glucose, dextrose, glucagon (rDNA), dextrose      Intake/Output Summary (Last 24 hours) at 2/10/2022 0933  Last data filed at 2/10/2022 0814  Gross per 24 hour   Intake 4796.71 ml   Output 3350 ml   Net 1446.71 ml       Diet:  ADULT DIET; Regular; 3 carb choices (45 gm/meal)  ADULT ORAL NUTRITION SUPPLEMENT; Lunch; Diabetic Oral Supplement    Exam:  BP (!) 162/85   Pulse 82   Temp 98.5 °F (36.9 °C) (Oral)   Resp 16   Ht 5' 7.01\" (1.702 m)   SpO2 100%   BMI 20.17 kg/m²       General appearance: No apparent distress, appears stated age and cooperative. HEENT: Pupils equal, round, and reactive to light. Conjunctivae/corneas clear. Neck: Supple, with full range of motion. No jugular venous distention. Trachea midline. Respiratory:  Normal respiratory effort. Clear to auscultation, bilaterally without Rales/Wheezes/Rhonchi. Cardiovascular: Regular rate and rhythm with normal S1/S2 without murmurs, rubs or gallops.   Abdomen: Soft, non-tender in all quadrants, non-distended with normal bowel sounds. Musculoskeletal: passive and active ROM x 4 extremities. Skin: Skin color, texture, turgor normal.  No rashes or lesions. Neurologic:  Neurovascularly intact without any focal sensory/motor deficits. Cranial nerves: II-XII intact, grossly non-focal.  Psychiatric: Alert and oriented, thought content appropriate, normal insight  Capillary Refill: Brisk,< 3 seconds   Peripheral Pulses: +2 palpable, equal bilaterally       Labs:   Recent Labs     02/08/22  0356 02/09/22  0418 02/10/22  0708   WBC 9.7 8.7 10.8   HGB 12.6* 12.2* 11.4*   HCT 39.6* 39.4* 37.1*   * 601* 536*     Recent Labs     02/08/22  0356 02/09/22  0418 02/10/22  0708   * 135 136   K 4.4 4.9 4.8   CL 95* 99 101   CO2 26 26 24   BUN 36* 30* 21   CREATININE 1.5* 1.3* 1.1   CALCIUM 8.6 8.8 8.7     No results for input(s): AST, ALT, BILIDIR, BILITOT, ALKPHOS in the last 72 hours. Recent Labs     02/08/22  0356 02/09/22  0418 02/10/22  0708   INR 1.02 1.00 1.00     No results for input(s): CKTOTAL, TROPONINI in the last 72 hours. Microbiology:    Culture, fungus - pending results  Culture, respiratory - preliminary result shows no growth to date.   Cytology - pending results  Culture, virus, respiratory - pending results  Pneumonia panel - negative  Culture with smear, acid-fast bacillus -pending results  HSV PCR -pending result  CMV by PCR qualitative - pending result  Surgical pathology - negative for malignancy  Cell count with differential, BAL -no malignant cells seen, 5% lymphocytes, otherwise normal        Urinalysis:      Lab Results   Component Value Date    NITRU NEGATIVE 09/28/2021    BLOODU NEGATIVE 09/28/2021    GLUCOSEU NEGATIVE 09/28/2021       Radiology:  CT CHEST WO CONTRAST    Result Date: 2/7/2022  PROCEDURE: CT CHEST WO CONTRAST CLINICAL INFORMATION: right side empyema: need follow  up CT COMPARISON: CT angiography chest 1/25/2825 TECHNIQUE: Helical CT acquisition of the chest was performed without intravenous contrast. Multiplanar reformats are provided. All CT scans at this facility use dose modulation, iterative reconstruction, and/or weight based dosing when appropriate to reduce the radiation dose to as low as reasonably achievable. FINDINGS: The noncontrast CT limits the evaluation for solid organ pathology, vascular pathology, and lymphadenopathy. The moderate right pleural effusion has resolved. There is now subsegmental platelike atelectasis in the right middle lobe and in the right upper lobe and right lower lobe. Mild emphysematous changes are seen. There is a 1.5 x 0.9 cm cavitary nodule in the left upper lobe in the region of the previously seen left upper lobe masslike abnormality. A 3 mm intrafissural pulmonary nodule (series 2, image 25). A 6 mm subpleural right middle lobe pulmonary nodule (series 2, image 36). A 3 mm subpleural right middle lobe pulmonary nodule (image 36). No focal pulmonary consolidation. No large pulmonary mass. Central airway is patent. No pleural effusions. No significant pericardial effusion. The heart is not enlarged. Ascending thoracic aorta is not dilated. The main pulmonary artery is not dilated. No significantly enlarged mediastinal or  axillary lymph node. The thyroid is not enlarged. A 2.3 x 0.8 cm subcutaneous lipoma is seen in the right lateral chest wall. Limited evaluation below the diaphragm show a 2.8 cm cyst in the superior pole of the right kidney. Stool is present in the colon. . Bones: No aggressive bony lesion. 1. Interval resolution of the right pleural effusion. 2. A 1.5 x 0.9 cm cavitary pulmonary nodule is seen in the left upper lobe replacing the left upper lobe masslike opacity seen on the prior exam. This is still worrisome for malignancy. 3. Other pulmonary nodules measuring 6 mm or less are seen as described above. 4. Other findings as described above.  **This report has been created using voice recognition software. It may contain minor errors which are inherent in voice recognition technology. ** Final report electronically signed by Dr Jeannette Barth on 2/7/2022 3:59 PM    XR CHEST PORTABLE    Result Date: 2/7/2022  PROCEDURE: XR CHEST PORTABLE CLINICAL INFORMATION: Right-sided empyema. COMPARISON: Chest x-ray 2/1/2022. TECHNIQUE: AP portable chest radiograph performed. FINDINGS: Lines/tubes: None. Heart/mediastinum: The heart size and mediastinal contours are stable. Lungs: The right pleural effusion has decreased in volume. Improved aeration is noted in the right lower lobe. The left lung is clear. No pneumothorax is observed. Bones: The visualized skeletal structures appear intact. The right pleural effusion has decreased in volume. Improved aeration is noted of the right lung base. No pneumothorax is observed. **This report has been created using voice recognition software. It may contain minor errors which are inherent in voice recognition technology. ** Final report electronically signed by Dr Isidra Bustamante on 2/7/2022 1:02 PM      DVT prophylaxis: [] Lovenox                                 [x] SCDs                                 [] SQ Heparin                                 [x] Encourage ambulation           [] Already on Anticoagulation     Code Status: Full Code    PT/OT Eval Status: No    Tele:   [] yes             [x] no    Active Hospital Problems    Diagnosis Date Noted    Mass of upper lobe of left lung [R91.8]     Empyema ([address] [J86.9] 02/07/2022       Electronically signed by BARBARA Patel CNP on 2/10/2022 at 9:33 AM

## 2022-02-10 NOTE — CARE COORDINATION
2/10/22, 1:38 PM EST    DISCHARGE PLANNING EVALUATION    [facility] transport arranged for 3 pm with [facility]. Transport paperwork faxed. JEANNIE Unger Dear updated.

## 2022-02-10 NOTE — TELEPHONE ENCOUNTER
Patient is still currently admitted unable to schedule outpatient appt at this time. Will need scheduled on discharge.

## 2022-02-10 NOTE — PLAN OF CARE
Problem: Pain:  Goal: Control of acute pain  Description: Control of acute pain  Problem: SAFETY  Goal: Free from accidental physical injury    Outcome: Ongoing  Note: Patient remains free from falls this shift. Fall risk assessment complete. Fall sign posted. Non skid socks on. Bed in lowest position, 2/4 siderails up. Bed alarm on. Call light and all possessions within reach. Ambulates in room with minimal assist. Rounding hourly      Problem: DAILY CARE  Goal: Daily care needs are met    Problem: SKIN INTEGRITY  Goal: Skin integrity is maintained or improved  Note: Patient turns and repositions self. Problem: DISCHARGE BARRIERS  Goal: Patient's continuum of care needs are met  Note: Plan to discharge home tomorrow with oral Keflex, to remain isolated until results of biopsy called to patient. Outcome: Ongoing  Note: Patient completes daily cares independently. Outcome: Ongoing  Note: Pain Assessment: 0-10  Pain Level: 6   Patient's Stated Pain Goal: 2   Is pain goal met at this time? No     Non-Pharmaceutical Pain Intervention(s): Rest,Repositioned   PRN Morphine and percocet added for pain, patient refusing these meds at this time. PRN tylenol given. Care plan reviewed with patient. Patient verbalize understanding of the plan of care and contribute to goal setting.

## 2022-02-10 NOTE — PROGRESS NOTES
ID Progress Note    Patient - Johnette Hatchet,  Age - 49 y.o.    - 1970      Room Number - 9U-47/760-A   MRN -  [id number]   Acct # - [de-identified]  Date of Admission -  2022 10:33 AM    SUBJECTIVE:   AFB was negative, no malignancy  OBJECTIVE   VITALS    height is 5' 7.01\" (1.702 m). His oral temperature is 98.5 °F (36.9 °C). His blood pressure is 162/85 (abnormal) and his pulse is 82. His respiration is 16 and oxygen saturation is 100%. Wt Readings from Last 3 Encounters:   22 128 lb 12.8 oz (58.4 kg)   22 165 lb (74.8 kg)   21 174 lb (78.9 kg)       I/O (24 Hours)    Intake/Output Summary (Last 24 hours) at 2/10/2022 1059  Last data filed at 2/10/2022 0814  Gross per 24 hour   Intake 4796.71 ml   Output 3350 ml   Net 1446.71 ml       General Appearance  Awake, alert, oriented,    HEENT - normocephalic, atraumatic, pink conjunctiva,  anicteric sclera. Neck - Supple, no mass. Lungs -  Bilateral   air entry, no rhonchi, no wheeze. Cardiovascular - Heart sounds are normal.    Abdomen - soft, not distended, nontender, no organomegally,  Neurologic - AOx3. Skin - No bruising or bleeding.   Extremities - No edema, no cyanosis, clubbing     MEDICATIONS:      insulin lispro  0-18 Units SubCUTAneous TID     insulin lispro  0-9 Units SubCUTAneous Nightly    cephALEXin  500 mg Oral 4 times per day    insulin glargine  5 Units SubCUTAneous Nightly    amLODIPine  10 mg Oral Daily    atorvastatin  40 mg Oral Nightly    hydroCHLOROthiazide  25 mg Oral Daily    cetirizine  10 mg Oral Daily    sodium chloride flush  5-40 mL IntraVENous 2 times per day      sodium chloride      dextrose       oxyCODONE-acetaminophen **OR** oxyCODONE-acetaminophen, fluticasone, sodium chloride flush, sodium chloride, ondansetron **OR** ondansetron, polyethylene glycol, acetaminophen **OR** acetaminophen, glucose, dextrose, glucagon (rDNA), dextrose  LABS:   CBC   Recent Labs     02/10/22  0708 WBC 10.8   HGB 11.4*   HCT 37.1*   MCV 79.4*   *     BMP:   Recent Labs     02/10/22  0708      K 4.8      CO2 24   BUN 21   CREATININE 1.1   GLUCOSE 254*   MG 2.0     LIVER PROFILE No results for input(s): AST, ALT, LIPASE, BILIDIR, BILITOT, ALKPHOS in the last 72 hours. Invalid input(s): AMYLASE,  ALB  INR   Recent Labs     02/10/22  0708   INR 1.00     PTT No results found for: APTT    CULTURES:   UA: No results for input(s): SPECGRAV, PHUR, COLORU, CLARITYU, MUCUS, PROTEINU, BLOODU, RBCUA, WBCUA, BACTERIA, NITRU, GLUCOSEU, BILIRUBINUR, UROBILINOGEN, KETUA, LABCAST, LABCASTTY, AMORPHOS in the last 72 hours. Invalid input(s): CRYSTALS  Micro:   Lab Results   Component Value Date    BC No growth-preliminary No growth  01/28/2022    BC No growth-preliminary No growth  01/28/2022         Patient active problem list     Patient Active Problem List   Diagnosis Code    GERD (gastroesophageal reflux disease) K21.9    Hyperlipidemia E78.5    Hypertension I10    Pneumonia J18.9    Empyema ([facility] [address] J86.9       Assessment and Plan   Left upper lung mass,cavitatry lesion. Likely post infectious: will discharge with oralkeflex  Stop airborne isolation  Recent COVID-19 infection requiring hospitalization.   Ok with discharge home  Britton Jansen MD

## 2022-02-10 NOTE — PROGRESS NOTES
Patient discharged at this time. All IV's removed. Discharge instructions, medication changes and follow up appointments explained at this time. All questions answered at this time. AVS given to patient and paperwork signed with this RN. All patient belongings returned. [facility] arrived to transport patient home with wife. Chart broken down and placed in yellow bin.

## 2022-02-10 NOTE — DISCHARGE SUMMARY
Hospital Medicine Discharge Summary      Patient Identification:   Estelle Malhotra   : 1970  MRN: [id number]   Account: [de-identified]      Patient's PCP: BARBARA Perez CNP    Admit Date: 2022     Discharge Date:   02/10/22     Admitting Physician: No admitting provider for patient encounter. Discharging Nurse Practitioner: BARBARA Patel CNP     Discharge Diagnoses with Assessment/Plan:    1. Right empyema              -CT chest () notable for resolution of moderate right pleural effusion.  Subsegmental atelectasis in RML, RUL, RLL.             -WMCEINM had thoracentesis 2022, and culture grew strep hominis              -Received 2 g IV Ancef from  - . Transitioned to p.o. Keflex  by infectious disease.                - Infectious disease consulted -> okay to discharge home. Patient to be discharged with p.o. Keflex for 14 day course.        1. Left upper lobe nodule/mass, unintentional weight loss, now cavitary lesion:              -CT chest () notable for 1.5 x 0.9 cm cavitary nodule in the left upper lobe.  3 mm intrafissural pulmonary nodule.  6 mm subpleural right middle lobe pulmonary nodule.  3 mm subpleural right middle lobe pulmonary nodule.  New cavitation from previous CT chest on . Concern for possible staph pneumonia, tuberculosis, malignancy.               - BAL performed 2022; cultures and biopsies obtained, some results pending. Cytology negative for malignancy.               - No concern for TB, discontinue airborne precautions per ID              - Pulmonology consulted -> f/u with pulmonology OP within 2 months, with CT chest w/ contrast to be done prior to pulmonology appt.      2. DONALD; resolved              -Creatinine 1.1 (2/10); downtrended from 1.7 upon admission ()              -Likely secondary to dehydration from #6.              -Potassium 4.8 (2/10).                - Okay to start home hydrochlorothiazide     3.  Recent COVID-19 infection              -Tested +1/27 and was admitted to hospital. Completed course of Decadron while at home upon discharge.                   - On room air satting 100%.                    - No residual effects noted.     4. Essential hypertension, controlled              - BP's normotensive, with few hypertensive episodes noted when pt has high pain score per chart review.               - Continue home amlodipine              - Resolution of #2. Start hydrochlorothiazide. Patient states \"I don't take lisinopril anymore. \"              - Provide adequate pain control.     5. Hyperlipidemia              - Lipitor     6. Risk for malnutrition              -Dietitian consulted, place patient on diabetic diet with 3 carb choices (45 gm/meal); and daily Glucerna     7. Type 2 DM              - Patient reported that he was previously told to be a diabetic in 2008, but has not been on medications since.              - Hemoglobin A1c 7.8 (2/7)              - Remains to be hyperglycemic with blood sugars elevated into 400s. Blood sugar this morning was 112 on morning BMP, and poc glucose was 253.                 - Discontinued high-dose sliding scale insulin and Lantus. Will be discharging patient home with oral Metformin and Glucotrol. Also sent patient home with home glucometer, test strips, lancets, and  alcohol pads and instructed patient to continue to monitor blood sugars. Instructed patient to follow up with PCP in 1 week for further management. Patient agreeable with this plan. The patient was seen and examined on day of discharge and this discharge summary is in conjunction with any daily progress note from day of discharge.     Hospital Course:     Per HPI documented 2/7/22, \"This is a 21-year-old gentleman with a past medical history of hypertension hyperlipidemia and recent COVID-19 infection. [facility] was recently admitted for his COVID-19 infection for where he presented last week with right-sided chest pain.  During his stay he was treated with baricitinib and Mariana Mathis was incidentally found to have a left upper lobe pulmonary mass measuring 3.2 x 1.5 cm.  He also had a right pleural effusion and underwent thoracentesis during his stay.  Fluid studies at that time did not grow anything on culture, however, while he was following up with pulmonology this week for the left upper lobe mass they reviewed his chart and found that the culture did eventually grow staph hominis.  Of note patient was also discharged with Levaquin last admission. Tari Blank was sent from clinic for further work-up and treatment of empyema. At this time patient feels improved from discharge he is still having some discomfort of in the right side of his chest.  He denies any fevers or chills.  He denies any lightheadedness, dizziness, hemoptysis, night sweats, dyspnea on exertion.  He states he otherwise feels quite well.  Still has a little bit of residual cough from his COVID-19 infection.  Denies any diarrhea or dysuria.  He endorses losing at least 30 pounds over the last 6 months unintentionally. Tari Blank states he was told he was a diabetic in the past but that was 2008 and since then has not been on any medications. Tari Blank denies any headaches or vision changes\"      2/9/22: Patient sitting up in bed, in no apparent distress.  Patient remains afebrile with hemodynamically stable vital signs.  Plan for patient to get bronchoscopy procedure done today.     2/10/22: Patient is afebrile, satting at 100% on room air, and has remained hemodynamically stable overnight. No acute events in the past 24 hours. Patient is very well-appearing. He is stating that he feels \"much better\". BAL with biopsy performed yesterday. Cultures/biopsies obtained and pending. Patient transitioned from IV Zosyn and vancomycin to oral ciprofloxacin per infectious disease. Patient blood sugars elevated into the 400s.   Blood sugar this morning was 112 on morning BMP, and poc glucose was 253. Even patient hemoglobin A1c is 7.8 (2/7), discussed going home with oral Metformin and Glucotrol with follow up with PCP in 1 week for further management. Patient agreeable with this plan. Patient also sent home with home glucometer, test strips, lancets, alcohol wipes, and instructed on how to check blood glucose levels. Patient educated about diabetic emergencies, including hypoglycemia. Patient was able to verbalize understanding via teach back. Patient cleared by infectious disease to be discharged home. Pulmonology cleared patient to go home, with orders to follow-up outpatient within 2 months of being discharged from hospital, and for patient to receive CT of his chest with contrast prior to his appointment. Patient was provided time of discharge ask any questions, and which he did not have any. At this time patient is stable for discharge. Exam:     Vitals:  Vitals:    02/09/22 1604 02/09/22 2015 02/10/22 0337 02/10/22 0811   BP:  119/73 138/86 (!) 162/85   Pulse:  70 87 82   Resp:  18 16 16   Temp:  98 °F (36.7 °C) 98.2 °F (36.8 °C) 98.5 °F (36.9 °C)   TempSrc:  Oral Oral Oral   SpO2:  97% 100% 100%   Height: 5' 7.01\" (1.702 m)        Weight:       24 hour intake/output:    Intake/Output Summary (Last 24 hours) at 2/10/2022 1335  Last data filed at 2/10/2022 1115  Gross per 24 hour   Intake 5756.71 ml   Output 3350 ml   Net 2406.71 ml         General appearance: No apparent distress, appears stated age and cooperative. HEENT: Pupils equal, round, and reactive to light. Conjunctivae/corneas clear. Neck: Supple, with full range of motion. No jugular venous distention. Trachea midline. Respiratory:  Normal respiratory effort. Clear to auscultation, bilaterally without Rales/Wheezes/Rhonchi. Cardiovascular: Regular rate and rhythm with normal S1/S2 without murmurs, rubs or gallops.   Abdomen: Soft, non-tender in all quadrants, non-distended with normal bowel errors which are inherent in voice recognition technology. **      Final report electronically signed by Dr Melida Posada on 2/7/2022 3:59 PM      XR CHEST PORTABLE   Final Result   The right pleural effusion has decreased in volume. Improved aeration is noted of the right lung base. No pneumothorax is observed. **This report has been created using voice recognition software. It may contain minor errors which are inherent in voice recognition technology. **      Final report electronically signed by Dr Young Hayden on 2/7/2022 1:02 PM      CT CHEST W CONTRAST    (Results Pending)          Consults:     IP CONSULT TO INFECTIOUS DISEASES  IP CONSULT TO PULMONOLOGY    Disposition:    [x] Home       [] TCU       [] Rehab       [] Psych       [] SNF       [] [place]       [] Other-    Condition at Discharge: Stable    Code Status:  Full Code     Pending tests at discharge:   BAL performed 2/9/22:  Culture, fungus - pending results  Culture, respiratory - preliminary result shows no growth to date. Cytology - pending results  Culture, virus, respiratory - pending results  Pneumonia panel - negative  Culture with smear, acid-fast bacillus -pending results  HSV PCR -pending result  CMV by PCR qualitative - pending result  Surgical pathology - negative for malignancy  Cell count with differential, BAL -no malignant cells seen, 5% lymphocytes, otherwise normal        Patient Instructions:    Discharge lab work: CT scan of Chest w/contrast.  Activity: activity as tolerated  Diet: ADULT DIET; Regular; 3 carb choices (45 gm/meal)  ADULT ORAL NUTRITION SUPPLEMENT; Lunch; Diabetic Oral Supplement      Follow-up visits:    [facility], BARBARA  CNP  [address]   [phone]    On 2/16/2022  your appointment time is at 10:30a, Please arrive 15mins early, Bring insurance card    STR CT SCAN  [address] [facility]  [phone]  In 2 months  Ct chest w/contrast in 2 months prior to pulmonary appt     MD Papito Ang  [address]  [address] [facility]    In 2 months  CT Chest prior to appt     [facility], APRN - CNP  [address]    In 1 week  Further management of diabetes. Discharge Medications:        Medication List      START taking these medications    blood glucose test strips  Test 3 times a day & as needed for symptoms of irregular blood glucose. Dispense sufficient amount for indicated testing frequency plus additional to accommodate PRN testing needs.      cephALEXin 500 MG capsule  Commonly known as: KEFLEX  Take 1 capsule by mouth 4 times daily for 14 days     glipiZIDE 5 MG tablet  Commonly known as: GLUCOTROL  Take 1 tablet by mouth 2 times daily     glucose monitoring kit  1 kit by Does not apply route daily     Lancets Misc  1 each by Does not apply route 2 times daily     metFORMIN 500 MG tablet  Commonly known as: GLUCOPHAGE  Take 1 tablet by mouth 2 times daily (with meals)        CONTINUE taking these medications    acetaminophen 325 MG tablet  Commonly known as: Aminofen  Take 2 tablets by mouth every 6 hours as needed for Pain     amLODIPine 10 MG tablet  Commonly known as: NORVASC  Take 1 tablet by mouth daily     atorvastatin 40 MG tablet  Commonly known as: LIPITOR  take 1 tablet by mouth once daily     fluticasone 50 MCG/ACT nasal spray  Commonly known as: Flonase  1 spray by Each Nare route daily     hydroCHLOROthiazide 25 MG tablet  Commonly known as: HYDRODIURIL  Take 1 tablet by mouth daily     loratadine 10 MG tablet  Commonly known as: Claritin  Take 1 tablet by mouth daily        STOP taking these medications    dexamethasone 4 MG tablet  Commonly known as: DECADRON     levoFLOXacin 750 MG tablet  Commonly known as: Levaquin     lisinopril 40 MG tablet  Commonly known as: PRINIVIL;ZESTRIL           Where to Get Your Medications      These medications were sent to [address], [address]  [address] [place]  [address], [facility] [address] [address]    Phone: [phone]   · blood glucose test strips  · glipiZIDE 5 MG tablet  · glucose monitoring kit  · Lancets Misc  · metFORMIN 500 MG tablet     These medications were sent to [facility], [address] [facility]  [address] [facility]    Phone: [phone]   · cephALEXin 500 MG capsule         Time Spent on discharge is more than 30 minutes in the examination, evaluation, counseling and review of medications and discharge plan. Signed: Thank you ANGEL LUIS [facility], APRN - CNP for the opportunity to be involved in this patient's care.     Electronically signed by BARBARA Lr CNP on 2/10/2022 at 1:35 PM

## 2022-02-10 NOTE — CARE COORDINATION
Herminia Zamora is discharged to home today on Keflex. No HH or HI needed. 2/10/22, 1:44 PM EST    Patient goals/plan/ treatment preferences discussed by  and . Patient goals/plan/ treatment preferences reviewed with patient/ family. Patient/ family verbalize understanding of discharge plan and are in agreement with goal/plan/treatment preferences. Understanding was demonstrated using the teach back method. AVS provided by RN at time of discharge, which includes all necessary medical information pertaining to the patients current course of illness, treatment, post-discharge goals of care, and treatment preferences.

## 2022-02-10 NOTE — PROGRESS NOTES
[facility]      Patient - Abdirahman Roque   MRN -  [id number]   [facility]   - 1970      Date of Admission -  2022 10:33 AM  Date of evaluation -  2/10/2022  Room - [facility] [address], APR - * Primary Care Physician - ANGEL LUIS [facility], APRN - CNP     Problem List      Active Hospital Problems    Diagnosis Date Noted    Mass of upper lobe of left lung [R91.8]     Empyema [facility]) [J86.9] 2022     Reason for Consult    Right Empyema  HPI   History Obtained From: Patient and electronic medical record. Chief complaint/[place]: Abdirahman Roque is a 49 y. o.old male came for further evaluation regarding his with referral from [facility] hospitalist service for incidental lung mass found on OR[place]O lobe on CTA chest on . The patient had recently been hospitalized for COVID-19 PNA with superimposed bacterial pneumonia and pleural effusion s/p thoracentesis. He states that he feels much better following his discharge on . He states that he still has some lingering chest pain on his R side, and that he has slight dry cough with occasional production of clear sputum. He believes he lost some weight while in the hospital. He denies any fever, chills, wheezing, SOB, congestion, sore throat, hemoptysis, nausea, vomiting, diarrhea, headache, dizziness, or any other symptoms.     He denies any history of lung disorders, CV disease, or any use of aspirin or plavix. He was seen in our clinic for follow-up on 2022 and we discussed readmission to the hospital for further management of his staph hominis cultures and subsequent empyema. Patient admitted for IV antibiotics and potential chest tube placement for recurrent right-sided pleural effusion. Subjective/Events Past 24 hours/ROS   AFB (-)  No malignancy    No pulmonary events overnight  No distress on room air- 96%  Home today     -All systems reviewed.      PMHx Past Medical History      Diagnosis Date    GERD (gastroesophageal reflux disease)     Headache(784.0)     Hyperlipidemia     Hypertension       Past Surgical History        Procedure Laterality Date    BRONCHOSCOPY N/A 2022    BRONCHOSCOPY WITH BIOPSY UNDER FLUORO performed by Dipika Howard MD at [facility]    MANDIBLE FRACTURE SURGERY       Meds    Current Medications    insulin lispro  0-18 Units SubCUTAneous TID WC    insulin lispro  0-9 Units SubCUTAneous Nightly    cephALEXin  500 mg Oral 4 times per day    insulin glargine  5 Units SubCUTAneous Nightly    amLODIPine  10 mg Oral Daily    atorvastatin  40 mg Oral Nightly    hydroCHLOROthiazide  25 mg Oral Daily    cetirizine  10 mg Oral Daily    sodium chloride flush  5-40 mL IntraVENous 2 times per day     oxyCODONE-acetaminophen **OR** oxyCODONE-acetaminophen, fluticasone, sodium chloride flush, sodium chloride, ondansetron **OR** ondansetron, polyethylene glycol, acetaminophen **OR** acetaminophen, glucose, dextrose, glucagon (rDNA), dextrose  IV Drips/Infusions   sodium chloride      dextrose       Home Medications  Medications Prior to Admission: dexamethasone (DECADRON) 4 MG tablet, Take 6 mg by mouth daily (with breakfast)  hydroCHLOROthiazide (HYDRODIURIL) 25 MG tablet, Take 1 tablet by mouth daily  amLODIPine (NORVASC) 10 MG tablet, Take 1 tablet by mouth daily  [] levoFLOXacin (LEVAQUIN) 750 MG tablet, Take 1 tablet by mouth daily for 5 days  acetaminophen (AMINOFEN) 325 MG tablet, Take 2 tablets by mouth every 6 hours as needed for Pain  lisinopril (PRINIVIL;ZESTRIL) 40 MG tablet, Take 1 tablet by mouth daily (Patient not taking: Reported on 2022)  fluticasone (FLONASE) 50 MCG/ACT nasal spray, 1 spray by Each Nare route daily (Patient not taking: Reported on 2022)  loratadine (CLARITIN) 10 MG tablet, Take 1 tablet by mouth daily (Patient not taking: Reported on 2022)  atorvastatin (LIPITOR) 40 MG tablet, take 1 tablet by mouth once daily (Patient not taking: Reported on 2/7/2022)  Diet    ADULT DIET; Regular; 3 carb choices (45 gm/meal)  ADULT ORAL NUTRITION SUPPLEMENT; Lunch; Diabetic Oral Supplement  Allergies    Aspirin and Motrin [ibuprofen micronized]  Social History     Social History     Socioeconomic History    Marital status: Single     Spouse name: Not on file    Number of children: Not on file    Years of education: Not on file    Highest education level: Not on file   Occupational History    Not on file   Tobacco Use    Smoking status: Current Every Day Smoker     Packs/day: 0.20     Years: 28.00     Pack years: 5.60     Types: Cigarettes    Smokeless tobacco: Never Used   Vaping Use    Vaping Use: Never used   Substance and Sexual Activity    Alcohol use: Yes     Alcohol/week: 0.0 standard drinks     Comment: Spends $39. a day on beer (49 ozs) pt states he quit a month ago, pt denies     Drug use: Yes     Types: Marijuana (Weed)     Comment:  pt denies quit a month ago     Sexual activity: Not on file   Other Topics Concern    Not on file   Social History Narrative    Not on file     Social Determinants of Health     Financial Resource Strain:     Difficulty of Paying Living Expenses: Not on file   Food Insecurity:     Worried About Running Out of Food in the Last Year: Not on file    Darius of Food in the Last Year: Not on file   Transportation Needs:     Lack of Transportation (Medical): Not on file    Lack of Transportation (Non-Medical):  Not on file   Physical Activity:     Days of Exercise per Week: Not on file    Minutes of Exercise per Session: Not on file   Stress:     Feeling of Stress : Not on file   Social Connections:     Frequency of Communication with Friends and Family: Not on file    Frequency of Social Gatherings with Friends and Family: Not on file    Attends Temple Services: Not on file    Active Member of Clubs or Organizations: Not on file   [address] [place] [address] Attends Club or Organization Meetings: Not on file    Marital Status: Not on file   Intimate Partner Violence:     Fear of Current or Ex-Partner: Not on file    Emotionally Abused: Not on file    Physically Abused: Not on file    Sexually Abused: Not on file   Housing Stability:     Unable to Pay for Housing in the Last Year: Not on file    Number of [place] in the Last Year: Not on file    Unstable Housing in the Last Year: Not on file     Family History          Problem Relation Age of Onset    Heart Disease Mother     Diabetes Mother     Hypertension Mother      Sleep History    Never diagnosed with sleep apnea in the past.  Occupational history   Occupation:  He is current working: No  Type of profession: unemployed. History of tobacco smoking:Yes  Amount of tobacco smokin.25 PPD. Years of tobacco smokin. Quit smoking: No.              Current smoker: Yes. Amount of current tobacco smokin.25 PPD  . History of recreational or IV drug use in the past:NO     History of exposure to coal mines/coal dust: NO  History of exposure to foundry dust/welding: NO  History of exposure to quarry/silica/sandblasting: NO  History of exposure to asbestos/working with breaks/ships: NO  History of exposure to farm dust: NO  History of recent travel to long distances: NO  History of exposure to birds, pigeons, or chickens in the past:NO  Pet animals at home:Yes  Dogs: 2    History of pulmonary embolism in the past: No            History of DVT in the past:No        [] Right lower extremity   [] Left lower extremity. Vitals     height is 5' 7.01\" (1.702 m). His oral temperature is 98.5 °F (36.9 °C). His blood pressure is 162/85 (abnormal) and his pulse is 82. His respiration is 16 and oxygen saturation is 100%. Body mass index is 20.17 kg/m².     SUPPLEMENTAL O2:       I/O        Intake/Output Summary (Last 24 hours) at 2/10/2022 [address] [place] [address] filed at 2/10/2022 1115  Gross per 24 hour   Intake 5756.71 ml   Output 3350 ml   Net 2406.71 ml     I/O last 3 completed shifts: In: 5606.7 [P.O.:2000; I.V.:3306.9; IV Piggyback:299.8]  Out: 4350 [Urine:4350]   No data found. Exam   Nursing note and vitals reviewed. Physical Exam   Constitutional: No distress on room air . Patient appears moderately built and  moderately nourished. Anxious to go home   Mouth/Throat: Oropharynx is clear and moist.  No oral thrush. Eyes: Conjunctivae are normal. Pupils are equal, round. No scleral icterus. Neck: Neck supple. No tracheal deviation present. Cardiovascular: S1 and S2 with no murmur. No peripheral edema  Pulmonary/Chest: Normal effort with bilateral air entry, clear breath sounds. No stridor. No respiratory distress. Patient exhibits no tenderness. Abdominal: Soft. Bowel sounds audible. No distension or tenderness to palp. Musculoskeletal: Moves all extremities; no cyanosis or clubbing   Neurological: Patient is alert and oriented to person, place, and time. Skin: Warm and dry. Labs  - Old records and notes have been reviewed in [facility]   ABG  No results found for: PH, PO2, PCO2, HCO3, O2SAT  No results found for: LUCÍA Lo  CBC  Recent Labs     02/08/22  0356 02/09/22  0418 02/10/22  0708   WBC 9.7 8.7 10.8   RBC 5.19 5.06 4.67*   HGB 12.6* 12.2* 11.4*   HCT 39.6* 39.4* 37.1*   MCV 76.3* 77.9* 79.4*   MCH 24.3* 24.1* 24.4*   MCHC 31.8* 31.0* 30.7*   * 601* 536*   MPV 8.7* 8.8* 9.0*      BMP  Recent Labs     02/08/22  0356 02/09/22  0418 02/10/22  0708   * 135 136   K 4.4 4.9 4.8   CL 95* 99 101   CO2 26 26 24   BUN 36* 30* 21   CREATININE 1.5* 1.3* 1.1   GLUCOSE 362* 234* 254*   MG 2.3 2.0 2.0   CALCIUM 8.6 8.8 8.7     LFT  No results for input(s): AST, ALT, ALB, BILITOT, ALKPHOS, LIPASE in the last 72 hours. Invalid input(s):   AMYLASE  TROP  Lab Results   Component Value Date    TROPONINT < 0.010 01/27/2022    TROPONINT < 0.010 05/10/2016     BNP  No results for input(s): BNP in the last 72 hours. Lactic Acid  No results for input(s): LACTA in the last 72 hours. INR  Recent Labs     02/08/22  0356 02/09/22  0418 02/10/22  0708   INR 1.02 1.00 1.00     PTT  No results for input(s): APTT in the last 72 hours. Glucose  Recent Labs     02/09/22  1758 02/09/22  2111 02/10/22  0816   POCGLU 428* 112* 253*     UA No results for input(s): SPECGRAV, PHUR, COLORU, CLARITYU, MUCUS, PROTEINU, BLOODU, RBCUA, WBCUA, BACTERIA, NITRU, GLUCOSEU, BILIRUBINUR, UROBILINOGEN, KETUA, LABCAST, LABCASTTY, AMORPHOS in the last 72 hours. Invalid input(s): CRYSTALS. PFTs   N/A    Sleep studies   N/A    Cultures    · Body Fluid Culture from Thoracentesis - Positive for Staphylococcus hominis. 2/9/22  Bronchoscopy   Endobronchial findings:   Trachea: Normal mucosa. April: Normal mucosa  Right main bronchus: Normal mucosa  Right upper lobe bronchus: Normal mucosa  Right Middle lobe bronchus: Normal mucosa  Right Lower lobe bronchus:Normal mucosa  Left main bronchus: Normal mucosa  Left upper lobe bronchus: Normal mucosa  Left lower lobe bronchus: Normal mucosa     No endobronchial lesions noted     Patient noted to have minimal oozing of blood from left upper lobe anterior segment after having transbronchial lung biopsy from left upper lobe anterior segment. It was effectively controlled by applying 2ml of topical diluted epinephrine. Hemostasis secured at the end of the procedure    FINAL DIAGNOSIS:   Lung, left upper lobe anterior, transbronchial biopsy:             Abundant benign bronchial mucosa.             Negative for malignancy.      EKG   Narrative & Impression    Normal sinus rhythm  Possible Left atrial enlargement  LVH ( Sokolow-English , Williams product )  Cannot rule out Septal infarct , age undetermined  T wave abnormality, consider lateral ischemia  Abnormal ECG  When compared with ECG of 10-MAY-2016 19:19,  Minimal criteria for Septal infarct are now Present  Confirmed by Marlane Opitz MD, Saskia Hawk ([id number]) on 1/27/2022 7:25:33 PM       Echocardiogram   N/A    Radiology    CXR  2/9/2022   XR CHEST PORTABLE   Patchy left perihilar airspace opacity is more prominent. Linear right lower lobe atelectasis/airspace opacities are unchanged. No pneumothorax is observed.          CT Scans  (See actual reports for details)  1/27 CTA Chest:  There is a large somewhat loculated appearing pleural effusion on the right side. There are adjacent areas of consolidation which may represent a combination of pneumonia and atelectasis. 2. There is a left upper lobe pulmonary mass. 3. Emphysematous changes.            Assessment   R Empyema growing Staph hominis s/p thoracentesis  3.2 cm x 1.5 cm cavitary pulmonary nodule found incidentally on CTA 1/27  Recent COVID-19 infection with superimposed bacterial pneumonia  Current smoker (~0.25 PPD, 40 year history  No Hx of previous lung disease  No Hx of concurrent CV disease  Full Code      Plan   Continue antibiotics, ID consulted  Following bronchoscopy cultures/cytology - cytology (-) for malignancy   Interval resolution of right sided pleural effusion, CT Surgery not planning to place chest tube. Stable currently from pulmonary standpoint - OK for discharge     RTC 2 months as outpatient with Ct Chest w/contrast to be done prior to pulmonary appt- testing ordered in [address] [place] [address], [place] [id number] of care discussed with patient and primary RN  Meds and orders reviewed. Questions and concerns addressed. Electronically signed by   BARBARA Latham CNP on 2/10/2022 at 11:41 AM     Addendum by Dr. Barry Dykes MD:  I have seen and examined the patient independently. Face to face evaluation and examination was performed. The above evaluation and note has been reviewed. Labs and radiographs were reviewed. I Have discussed with Ms. NIKKI Latham CNP about this patient in detail.   Physical exam was performed by me. See below for details. More than half of the total time for this encounter spent by me. The above assessment and plan has been reviewed. Please see my modifications mentioned below. My modifications:    Physical Exam:  Constitutional: Patient appears in no distress. Mouth/Throat: Oropharynx is clear and moist.    Neck: Neck supple. Cardiovascular: S1 and S2 heard. Pulmonary/Chest: Bilateral air entry present. Good breath sounds on both sides, diminished at bases. No wheezes. No rales. Abdominal: Soft. No tenderness. Extremities: Patient exhibits no edema. Neurological: Patient is awake and alert. Plan:  Patient to come for follow-up  - Clay Chris educated about my impression and plan. He verbalizes understanding.     Max Campbell MD 2/10/2022 11:07 PM

## 2022-02-11 LAB
GRAM STAIN RESULT: NORMAL
GRAM STAIN RESULT: NORMAL
RESPIRATORY CULTURE: NORMAL
RESPIRATORY CULTURE: NORMAL

## 2022-02-12 LAB
MISC. #1 REFERENCE GROUP TEST: NORMAL
MISC. #1 REFERENCE GROUP TEST: NORMAL

## 2022-02-13 LAB — MISC. #1 REFERENCE GROUP TEST: NORMAL

## 2022-02-14 LAB
HERPES SIMPLEX VIRUS BY PCR: NOT DETECTED
HSV SOURCE: NORMAL

## 2022-02-15 LAB
CMV BY PCR, QUALITATIVE BLOOD: NOT DETECTED
VIRAL CULTURE,RAPID,RESPIRATOR: NORMAL
VIRAL CULTURE,RAPID,RESPIRATOR: NORMAL

## 2022-02-21 NOTE — TELEPHONE ENCOUNTER
Patient is scheduled for f/u in April, phoned patient to schedule CT scan, transferred patient to scheduling to schedule.

## 2022-03-13 NOTE — PROGRESS NOTES
[facility]  Pulmonary medicine clinic initial follow up note. Patient: Kelsie Aguila  : 1970      Chief complaint/[place]: Kelsie Aguila is a 49 y.o. old male came for further evaluation regarding his abnormal CT scan of chest with a left upper lobe cavitary lesion. He is  a 49 y. o.old male was admitted to [address] [place] [address] under hospitalist service for incidental lung mass found on [place]O lobe on CTA chest on .  He was hospitalized for COVID-19 PNA with superimposed bacterial pneumonia and pleural effusion s/p thoracentesis. He underwent bronchoscopy procedure under general anesthesia by me on 2022. During bronchoscopy procedure patient underwent:    During procedure following procedures were performed:  Bronchioalveolar lavage: obtained from Left upper lobe anterior segment. Bronch washings obtained from left tracheobronchial tree including left upper, lingular and lower lobes. Trans bronchial lung biopsies were performed by using biopsy forceps from Left upper lobe anterior segment under fluroscopy guidance. On today's questioning:  He denies cough or expectoration. He denies hemoptysis. He denies fever or chills. He denies recent hospitalizations or emergency room visits. He is currently not using any inhalers. He denies recent loss of weight or appetite changes. He denies recent decline in functional status.       He is currently using any oxygen supplementation at rest, exercise or during sleep/at night time: No    He was ever diagnosed with following pulmonary diseases:  Asthma:NO  Pulmonary fibrosis:NO  Sarcoidosis:NO  Pulmonary embolism: NO   History of DVT in the past: NO    Pulmonary hypertension:NO  Pleural effusion/s in the past:NO    He ever diagnosed with connective tissue diseases including Systemic lupus Erythematosus, Rheumatoid arthritis etc:NO    He ever diagnosed with pulmonary tuberculosis in the past:NO  He ever recently exposed to patients with tuberculosis:NO  He ever recently travelled to endemic places of tuberculosis or out side [place]:NO  His ever tested for PPD in the past: NO    He received a COVID-19 vaccine first dose. 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  He used to live in [place]. Social History     Tobacco Use    Smoking status: Current Every Day Smoker     Packs/day: 0.20     Years: 28.00     Pack years: 5.60     Types: Cigarettes    Smokeless tobacco: Never Used   Vaping Use    Vaping Use: Never used   Substance Use Topics    Alcohol use: Not Currently     Comment: Spends $39. a day on beer (49 ozs) pt states he quit a month ago, pt denies     Drug use: Yes     Types: Marijuana Curlie Opal)     He is a chronic smoker for the last 28 years. He always used to smoke a 0.25 packs of cigarettes per day. He still smoking 0.25 packs of cigarettes per day. He is still smoking marijuana 2 to 3 days a week. Each time he smokes 1 joint of marijuana per day. History of recreational or IV drug use in the past: No other recreational drug use apart from marijuana  History of Alcohol use: No.       History of exposure to coal mines/coal dust: NO  History of exposure to foundry dust/welding: NO  History of exposure to quarry/silica/sandblasting: NO  History of exposure to asbestos/working with breaks/ships: NO  History of exposure to farm dust: NO  History of recent travel to long distances: NO  History of exposure to birds, pigeons, or chickens in the past:NO  Pet animals at home:Yes  Dogs: 2  Cats: 0    History of pulmonary embolism in the past: No            History of DVT in the past:No                             Review of Systems:   General/Constitutional: No recent loss of weight or appetite changes. No fever or chills.   HENT: Negative. Eyes: Negative. Upper respiratory tract: No nasal stuffiness or post nasal drip. Lower respiratory tract/ lungs: Occasional cough with no sputum production. No hemoptysis. Cardiovascular: No palpitations or chest pain. Gastrointestinal: No nausea or vomiting. Neurological: No focal neurologiacal weakness. Extremities: No edema. Musculoskeletal: No complaints. Genitourinary: No complaints. Hematological: Negative. Psychiatric/Behavioral: Negative. Skin: No itching. Current Medications:      Past Medical History:   Diagnosis Date    GERD (gastroesophageal reflux disease)     Headache(784.0)     Hyperlipidemia     Hypertension        Past Surgical History:   Procedure Laterality Date    BRONCHOSCOPY N/A 2/9/2022    BRONCHOSCOPY WITH BIOPSY UNDER FLUORO performed by Lissette Mcrae MD at [facility]    MANDIBLE FRACTURE SURGERY         Allergies   Allergen Reactions    Aspirin Hives and Rash    Motrin [Ibuprofen Micronized] Hives and Rash       Current Outpatient Medications   Medication Sig Dispense Refill    LISINOPRIL PO Take by mouth      metFORMIN (GLUCOPHAGE) 500 MG tablet Take 1 tablet by mouth 2 times daily (with meals) 180 tablet 1    glipiZIDE (GLUCOTROL) 5 MG tablet Take 1 tablet by mouth 2 times daily 60 tablet 1    blood glucose monitor strips Test 3 times a day & as needed for symptoms of irregular blood glucose. Dispense sufficient amount for indicated testing frequency plus additional to accommodate PRN testing needs.  300 strip 0    glucose monitoring (FREESTYLE FREEDOM) kit 1 kit by Does not apply route daily 1 kit 0    Lancets MISC 1 each by Does not apply route 2 times daily 300 each 1    hydroCHLOROthiazide (HYDRODIURIL) 25 MG tablet Take 1 tablet by mouth daily 30 tablet 3    amLODIPine (NORVASC) 10 MG tablet Take 1 tablet by mouth daily 30 tablet 3    atorvastatin (LIPITOR) 40 MG tablet take 1 tablet by mouth once daily 30 tablet 6    fluticasone (FLONASE) 50 MCG/ACT nasal spray 1 spray by Each Nare route daily (Patient not taking: Reported on 2/7/2022) 1 Bottle 0    loratadine (CLARITIN) 10 MG tablet Take 1 tablet by mouth daily (Patient not taking: Reported on 2/7/2022) 20 tablet 0     No current facility-administered medications for this visit. Family History   Problem Relation Age of Onset    Heart Disease Mother     Diabetes Mother     Hypertension Mother            Physical Exam:    VITALS:  /72 (Site: Left Upper Arm, Position: Sitting, Cuff Size: Medium Adult)   Pulse 97   Temp 98.2 °F (36.8 °C)   Ht 5' 7\" (1.702 m)   Wt 143 lb (64.9 kg)   SpO2 98% Comment: room air at rest  BMI 22.40 kg/m²   Nursing note and vitals reviewed. Constitutional: Patient appears moderately built and moderately nourished. No distress. Patient is oriented to person, place, and time. HENT:   Head: Normocephalic and atraumatic. Right Ear: External ear normal.   Left Ear: External ear normal.   Mouth/Throat: Oropharynx is clear and moist.  No oral thrush. Eyes: Conjunctivae are normal. Pupils are equal, round, and reactive to light. No scleral icterus. Neck: Neck supple. No JVD present. No tracheal deviation present. Cardiovascular: Normal rate, regular rhythm, normal heart sounds. No murmur heard. Pulmonary/Chest: Effort normal and breath sounds normal. No stridor. No respiratory distress. No wheezes. No rales. Patient exhibits no tenderness. Abdominal: Soft. Patient exhibits no distension. No tenderness. Musculoskeletal: Normal range of motion. Extremities: Patient exhibits no edema and no tenderness. Lymphadenopathy:  No cervical adenopathy. Neurological: Patient is alert and oriented to person, place, and time. Skin: Skin is warm and dry. Patient is not diaphoretic. Psychiatric: Patient  has a normal mood and affect.  Patient behavior is normal.     Diagnostic Data:    Radiological Data:  CXR  2/9/2022   XR CHEST PORTABLE   Patchy left perihilar airspace opacity is more prominent. Linear right lower lobe atelectasis/airspace opacities are unchanged. No pneumothorax is observed.        CT Scans  (See actual reports for details)  1/27 CTA Chest:  There is a large somewhat loculated appearing pleural effusion on the right side. There are adjacent areas of consolidation which may represent a combination of pneumonia and atelectasis. 2. There is a left upper lobe pulmonary mass. 3. Emphysematous changes.          CT scan of chest with IV contrast performed on 5 April 2022:  PROCEDURE: CT CHEST W CONTRAST   CLINICAL INFORMATION: Empyema. COMPARISON: CT chest 2/7/2022. The left upper lobe pulmonary nodule continues to decrease in size now measuring 4 x 8 mm and the cavitary component of the nodule is no longer visualized. No new or suspicious pulmonary mass or nodule is observed. Scattered benign calcified granulomas are stable. An additional 3 month follow-up noncontrast CT thorax is advised to confirm complete resolution. Pulmonary function tests:  None in Epic    Bronchoscopy results:  Hospital performed: [facility]. Date of procedure: 9 February 2022  Procedure: During procedure following procedures were performed:  Bronchioalveolar lavage: obtained from Left upper lobe anterior segment. Bronch washings obtained from left tracheobronchial tree including left upper, lingular and lower lobes. Trans bronchial lung biopsies were performed by using biopsy forceps from Left upper lobe anterior segment under fluroscopy guidance. Bronch washings/BAL ( Broncho alveolar lavage):   -I reviewed BAL (Broncho Alveolar Lavage) gram stain, cultures and cytology reports on [facility] today i.e on 4/12/22. All BAL (Broncho Alveolar Lavage) results were negative so far.     Transbronchial biopsy:  Clinical Information: SHAGUFTA PNEUMONIA     FINAL DIAGNOSIS:   Lung, left upper lobe anterior, transbronchial related, Pathological, Microbiological and Radiological investigations. Total time spent interviewing the patient, evaluating lab data and X-ray data and processing orders was 45 Minutes. I personally spent more than 50% of the appointment time face to face with the patient providing counseling and coordinating the patient's care.

## 2022-03-14 LAB
FUNGUS IDENTIFIED: NORMAL
FUNGUS IDENTIFIED: NORMAL
FUNGUS SMEAR: NORMAL
FUNGUS SMEAR: NORMAL

## 2022-03-28 LAB
AFB CULTURE & SMEAR: NORMAL
AFB CULTURE & SMEAR: NORMAL

## 2022-04-05 ENCOUNTER — HOSPITAL ENCOUNTER (OUTPATIENT)
Dept: CT IMAGING | Age: 52
Discharge: HOME | End: 2022-04-05
Payer: MEDICARE

## 2022-04-05 DIAGNOSIS — J86.9 EMPYEMA (HCC): ICD-10-CM

## 2022-04-05 LAB — POC CREATININE WHOLE BLOOD: 1.4 MG/DL (ref 0.5–1.2)

## 2022-04-05 PROCEDURE — 71260 CT THORAX DX C+: CPT

## 2022-04-05 PROCEDURE — 6360000004 HC RX CONTRAST MEDICATION: Performed by: NURSE PRACTITIONER

## 2022-04-05 PROCEDURE — 82565 ASSAY OF CREATININE: CPT

## 2022-04-05 RX ADMIN — IOPAMIDOL 85 ML: 755 INJECTION, SOLUTION INTRAVENOUS at 11:00

## 2022-04-12 ENCOUNTER — OFFICE VISIT (OUTPATIENT)
Dept: PULMONOLOGY | Age: 52
End: 2022-04-12
Payer: MEDICARE

## 2022-04-12 VITALS
OXYGEN SATURATION: 98 % | BODY MASS INDEX: 22.44 KG/M2 | HEART RATE: 97 BPM | DIASTOLIC BLOOD PRESSURE: 72 MMHG | WEIGHT: 143 LBS | TEMPERATURE: 98.2 F | HEIGHT: 67 IN | SYSTOLIC BLOOD PRESSURE: 138 MMHG

## 2022-04-12 DIAGNOSIS — Z77.22 TOBACCO SMOKE EXPOSURE: ICD-10-CM

## 2022-04-12 DIAGNOSIS — R91.1 LEFT UPPER LOBE PULMONARY NODULE: Primary | ICD-10-CM

## 2022-04-12 PROCEDURE — 3017F COLORECTAL CA SCREEN DOC REV: CPT | Performed by: INTERNAL MEDICINE

## 2022-04-12 PROCEDURE — G8427 DOCREV CUR MEDS BY ELIG CLIN: HCPCS | Performed by: INTERNAL MEDICINE

## 2022-04-12 PROCEDURE — 4004F PT TOBACCO SCREEN RCVD TLK: CPT | Performed by: INTERNAL MEDICINE

## 2022-04-12 PROCEDURE — G8420 CALC BMI NORM PARAMETERS: HCPCS | Performed by: INTERNAL MEDICINE

## 2022-04-12 PROCEDURE — 99215 OFFICE O/P EST HI 40 MIN: CPT | Performed by: INTERNAL MEDICINE

## 2022-04-12 NOTE — PROGRESS NOTES
Neck Circumference -   13.5  Mallampati - 4    Lung Nodule Screening     [] Qualifies    [] Does not qualify   [] Declined    [] Completed  Ct chest 4/5/22

## 2022-04-12 NOTE — PATIENT INSTRUCTIONS
Recommendations/Plan:  -Schedule patient for CT scan of chest without IV contrast in 3months to follow abnormal CT Chest with left upper lobe pulmonary nodule measuring 4 x 8 mm.  -Patient educated to quit smoking as soon as possible. Patient verbalizes understanding of adverse consequences of tobacco smoking.  - Patient educated to quit smoking Marijuana as soon as possible. Patient verbalizes understanding of adverse consequences of Marijuana smoking.  -Schedule patient for full pulmonary function tests before clinic visit.  -Patient educated to update his pneumococcal vaccine with family physician and take influenza vaccine in coming season with out fail.   - Schedule patient for follow up with my clinic in 3months with recommended testing including CT scan of chest without IV contrast and full pulmonary function tests to be done at least 2 days before clinic visit. He was advised to make early appointment if needed. - Kelsie Aguila educated about my impression and plan. He verbalizes understanding.

## 2022-11-06 ENCOUNTER — HOSPITAL ENCOUNTER (EMERGENCY)
Age: 52
Discharge: HOME | End: 2022-11-06
Attending: EMERGENCY MEDICINE
Payer: MEDICARE

## 2022-11-06 VITALS
SYSTOLIC BLOOD PRESSURE: 172 MMHG | OXYGEN SATURATION: 99 % | TEMPERATURE: 98.2 F | HEART RATE: 88 BPM | DIASTOLIC BLOOD PRESSURE: 92 MMHG | RESPIRATION RATE: 20 BRPM

## 2022-11-06 DIAGNOSIS — K08.89 PAIN, DENTAL: Primary | ICD-10-CM

## 2022-11-06 PROCEDURE — 99283 EMERGENCY DEPT VISIT LOW MDM: CPT | Performed by: EMERGENCY MEDICINE

## 2022-11-06 RX ORDER — AMOXICILLIN AND CLAVULANATE POTASSIUM 875; 125 MG/1; MG/1
1 TABLET, FILM COATED ORAL 2 TIMES DAILY
Qty: 14 TABLET | Refills: 0 | Status: SHIPPED | OUTPATIENT
Start: 2022-11-06 | End: 2022-11-13

## 2022-11-06 RX ORDER — HYDROCODONE BITARTRATE AND ACETAMINOPHEN 5; 325 MG/1; MG/1
1 TABLET ORAL EVERY 4 HOURS PRN
Qty: 18 TABLET | Refills: 0 | Status: SHIPPED | OUTPATIENT
Start: 2022-11-06 | End: 2022-11-09

## 2022-11-06 ASSESSMENT — ENCOUNTER SYMPTOMS
SHORTNESS OF BREATH: 0
DIARRHEA: 0
ABDOMINAL PAIN: 0
COUGH: 0
EYE PAIN: 0
SINUS PAIN: 1
PHOTOPHOBIA: 0
CONSTIPATION: 0
FACIAL SWELLING: 1
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
## 2022-11-06 NOTE — ED PROVIDER NOTES
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
## 2023-07-02 NOTE — PROGRESS NOTES
ID Progress Note    Patient - Zeferino Valentino,  Age - 49 y.o.    - 1970      Room Number - 5K-14/014-A   MRN -  [id number]   Acct # - [de-identified]  Date of Admission -  2022 10:33 AM    SUBJECTIVE:   No acute events overnight. Denies chest pain or shortness of breath. Denies fever or chills. Bronchoscopy planned in the afternoon. CT showed a cavitary lesion. Previous PPD test was negative  +hx of smoking  OBJECTIVE   VITALS    oral temperature is 98 °F (36.7 °C). His blood pressure is 151/76 (abnormal) and his pulse is 79. His respiration is 16 and oxygen saturation is 99%. Wt Readings from Last 3 Encounters:   22 128 lb 12.8 oz (58.4 kg)   22 165 lb (74.8 kg)   21 174 lb (78.9 kg)       I/O (24 Hours)    Intake/Output Summary (Last 24 hours) at 2022 0905  Last data filed at 2022 6121  Gross per 24 hour   Intake 1939.21 ml   Output 300 ml   Net 1639.21 ml       General Appearance  Awake, alert, oriented,  not  In acute distress.  Cachectic in appearance   HEENT - normocephalic, atraumatic, pink conjunctiva,  anicteric sclera  Neck - Supple, no mass  Lungs -  Bilateral   air entry, no rhonchi, no wheeze  Cardiovascular - Heart sounds are normal.    Abdomen - soft, not distended, nontender, no organomegally,  Neurologic - AOx3  Skin - No bruising or bleeding  Extremities - No edema, no cyanosis, clubbing     MEDICATIONS:      amLODIPine  10 mg Oral Daily    atorvastatin  40 mg Oral Nightly    hydroCHLOROthiazide  25 mg Oral Daily    cetirizine  10 mg Oral Daily    sodium chloride flush  5-40 mL IntraVENous 2 times per day    insulin lispro  0-12 Units SubCUTAneous TID WC    insulin lispro  0-6 Units SubCUTAneous Nightly    ceFAZolin  2,000 mg IntraVENous Q8H      sodium chloride      dextrose       fluticasone, sodium chloride flush, sodium chloride, ondansetron **OR** ondansetron, polyethylene glycol, acetaminophen **OR** acetaminophen, glucose, Procedure:  LABOR ANALGESIA    Relevant Problems   GYN   (+) 38 weeks gestation of pregnancy        Physical Exam    Airway    Mallampati score: III  TM Distance: >3 FB  Neck ROM: full     Dental   No notable dental hx     Cardiovascular      Pulmonary      Other Findings        Anesthesia Plan  ASA Score- 2     Anesthesia Type- epidural with ASA Monitors. Additional Monitors:   Airway Plan:           Plan Factors-Exercise tolerance (METS): >4 METS. Chart reviewed. Existing labs reviewed. Patient summary reviewed. Induction-     Postoperative Plan-     Informed Consent- Anesthetic plan and risks discussed with patient. dextrose, glucagon (rDNA), dextrose  LABS:   CBC   Recent Labs     02/08/22  0356   WBC 9.7   HGB 12.6*   HCT 39.6*   MCV 76.3*   *     BMP:   Recent Labs     02/08/22  0356   *   K 4.4   CL 95*   CO2 26   BUN 36*   CREATININE 1.5*   GLUCOSE 362*     LIVER PROFILE No results for input(s): AST, ALT, LIPASE, BILIDIR, BILITOT, ALKPHOS in the last 72 hours. Invalid input(s): AMYLASE,  ALB  INR   Recent Labs     02/08/22  0356   INR 1.02     PTT No results found for: APTT    CULTURES:   UA: No results for input(s): SPECGRAV, PHUR, COLORU, CLARITYU, MUCUS, PROTEINU, BLOODU, RBCUA, WBCUA, BACTERIA, NITRU, GLUCOSEU, BILIRUBINUR, UROBILINOGEN, KETUA, LABCAST, LABCASTTY, AMORPHOS in the last 72 hours. Invalid input(s): CRYSTALS  Micro:   Lab Results   Component Value Date    BC No growth-preliminary No growth  01/28/2022    BC No growth-preliminary No growth  01/28/2022         Patient active problem list     Patient Active Problem List   Diagnosis Code    GERD (gastroesophageal reflux disease) K21.9    Hyperlipidemia E78.5    Hypertension I10    Pneumonia J18.9    [facility] J86.9       Assessment and Plan   Left upper lung mass,cavitatry lesion. Pending bronchoscopy, concern for possible malignancy vs TB( less likely based on previous test)   Right sided pleural effusion - resolved, per CT chest 2/7/22. Patient had thoracentesis 1/28/22 and culture grew Strep hominis. He is now on Ancef day 2. Patient will need at least 14 days of treatment. Recent COVID-19 infection requiring hospitalization. Annabelle Dominguez MD   PGY-2 Internal Medicine Resident    Case discussed with Dr. Ekta Thomas MD.   Patient was seen and examined face-to-face by me  The chart, progress notes, labs and radiographs were reviewed. Case discussed with the nurse. Questions and concerns were addressed. I agree with the progress note.

## 2024-03-31 ENCOUNTER — HOSPITAL ENCOUNTER (EMERGENCY)
Age: 54
Discharge: LEFT BEFORE DISCHARGE | End: 2024-03-31
Attending: STUDENT IN AN ORGANIZED HEALTH CARE EDUCATION/TRAINING PROGRAM
Payer: COMMERCIAL

## 2024-03-31 ENCOUNTER — APPOINTMENT (OUTPATIENT)
Dept: GENERAL RADIOLOGY | Age: 54
End: 2024-03-31
Payer: COMMERCIAL

## 2024-03-31 VITALS
BODY MASS INDEX: 23.49 KG/M2 | OXYGEN SATURATION: 97 % | DIASTOLIC BLOOD PRESSURE: 117 MMHG | HEART RATE: 90 BPM | WEIGHT: 150 LBS | SYSTOLIC BLOOD PRESSURE: 178 MMHG | RESPIRATION RATE: 23 BRPM | TEMPERATURE: 98 F

## 2024-03-31 DIAGNOSIS — Z53.29 LEFT AGAINST MEDICAL ADVICE: ICD-10-CM

## 2024-03-31 DIAGNOSIS — R06.00 DYSPNEA, UNSPECIFIED TYPE: Primary | ICD-10-CM

## 2024-03-31 DIAGNOSIS — R79.89 ELEVATED TROPONIN: ICD-10-CM

## 2024-03-31 LAB
ANION GAP SERPL CALC-SCNC: 15 MEQ/L (ref 8–16)
BASOPHILS ABSOLUTE: 0 THOU/MM3 (ref 0–0.1)
BASOPHILS NFR BLD AUTO: 0.6 %
BUN SERPL-MCNC: 37 MG/DL (ref 7–22)
CALCIUM SERPL-MCNC: 8.1 MG/DL (ref 8.5–10.5)
CHLORIDE SERPL-SCNC: 100 MEQ/L (ref 98–111)
CO2 SERPL-SCNC: 21 MEQ/L (ref 23–33)
CREAT SERPL-MCNC: 1.7 MG/DL (ref 0.4–1.2)
D DIMER PPP IA.FEU-MCNC: 225 NG/ML FEU (ref 0–500)
DEPRECATED RDW RBC AUTO: 46.2 FL (ref 35–45)
EOSINOPHIL NFR BLD AUTO: 0.5 %
EOSINOPHILS ABSOLUTE: 0 THOU/MM3 (ref 0–0.4)
ERYTHROCYTE [DISTWIDTH] IN BLOOD BY AUTOMATED COUNT: 16.7 % (ref 11.5–14.5)
FLUAV RNA RESP QL NAA+PROBE: NOT DETECTED
FLUBV RNA RESP QL NAA+PROBE: NOT DETECTED
GFR SERPL CREATININE-BSD FRML MDRD: 48 ML/MIN/1.73M2
GLUCOSE SERPL-MCNC: 143 MG/DL (ref 70–108)
HCT VFR BLD AUTO: 42.3 % (ref 42–52)
HGB BLD-MCNC: 13.5 GM/DL (ref 14–18)
IMM GRANULOCYTES # BLD AUTO: 0.03 THOU/MM3 (ref 0–0.07)
IMM GRANULOCYTES NFR BLD AUTO: 0.5 %
LYMPHOCYTES ABSOLUTE: 1 THOU/MM3 (ref 1–4.8)
LYMPHOCYTES NFR BLD AUTO: 16 %
MCH RBC QN AUTO: 25 PG (ref 26–33)
MCHC RBC AUTO-ENTMCNC: 31.9 GM/DL (ref 32.2–35.5)
MCV RBC AUTO: 78.3 FL (ref 80–94)
MONOCYTES ABSOLUTE: 0.8 THOU/MM3 (ref 0.4–1.3)
MONOCYTES NFR BLD AUTO: 12 %
NEUTROPHILS NFR BLD AUTO: 70.4 %
NRBC BLD AUTO-RTO: 0 /100 WBC
OSMOLALITY SERPL CALC.SUM OF ELEC: 283.1 MOSMOL/KG (ref 275–300)
PLATELET # BLD AUTO: 276 THOU/MM3 (ref 130–400)
PMV BLD AUTO: 10.1 FL (ref 9.4–12.4)
POTASSIUM SERPL-SCNC: 4.6 MEQ/L (ref 3.5–5.2)
RBC # BLD AUTO: 5.4 MILL/MM3 (ref 4.7–6.1)
SARS-COV-2 RNA RESP QL NAA+PROBE: NOT DETECTED
SEGMENTED NEUTROPHILS ABSOLUTE COUNT: 4.5 THOU/MM3 (ref 1.8–7.7)
SODIUM SERPL-SCNC: 136 MEQ/L (ref 135–145)
TROPONIN, HIGH SENSITIVITY: 27 NG/L (ref 0–12)
WBC # BLD AUTO: 6.4 THOU/MM3 (ref 4.8–10.8)

## 2024-03-31 PROCEDURE — 93005 ELECTROCARDIOGRAM TRACING: CPT | Performed by: STUDENT IN AN ORGANIZED HEALTH CARE EDUCATION/TRAINING PROGRAM

## 2024-03-31 PROCEDURE — 87636 SARSCOV2 & INF A&B AMP PRB: CPT

## 2024-03-31 PROCEDURE — 85025 COMPLETE CBC W/AUTO DIFF WBC: CPT

## 2024-03-31 PROCEDURE — 99285 EMERGENCY DEPT VISIT HI MDM: CPT

## 2024-03-31 PROCEDURE — 84484 ASSAY OF TROPONIN QUANT: CPT

## 2024-03-31 PROCEDURE — 36415 COLL VENOUS BLD VENIPUNCTURE: CPT

## 2024-03-31 PROCEDURE — 71046 X-RAY EXAM CHEST 2 VIEWS: CPT

## 2024-03-31 PROCEDURE — 85379 FIBRIN DEGRADATION QUANT: CPT

## 2024-03-31 PROCEDURE — 80048 BASIC METABOLIC PNL TOTAL CA: CPT

## 2024-03-31 PROCEDURE — 93010 ELECTROCARDIOGRAM REPORT: CPT | Performed by: INTERNAL MEDICINE

## 2024-03-31 ASSESSMENT — HEART SCORE: ECG: NON-SPECIFC REPOLARIZATION DISTURBANCE/LBTB/PM

## 2024-03-31 ASSESSMENT — PAIN - FUNCTIONAL ASSESSMENT: PAIN_FUNCTIONAL_ASSESSMENT: NONE - DENIES PAIN

## 2024-03-31 NOTE — DISCHARGE INSTRUCTIONS
Today you were seen for shortness of breath.  You have left prior to evaluation.  You were explained that your shortness of breath could cause worsening of chronic disability and death.  Please return at any time for further evaluation of your shortness of breath.  There were concerning findings on your EKG and labs, that would benefit from additional workup, though you are declining at this time.

## 2024-04-04 LAB
EKG ATRIAL RATE: 86 BPM
EKG P AXIS: 47 DEGREES
EKG P-R INTERVAL: 150 MS
EKG Q-T INTERVAL: 396 MS
EKG QRS DURATION: 76 MS
EKG QTC CALCULATION (BAZETT): 473 MS
EKG R AXIS: -50 DEGREES
EKG T AXIS: 98 DEGREES
EKG VENTRICULAR RATE: 86 BPM

## 2024-04-09 ENCOUNTER — HOSPITAL ENCOUNTER (EMERGENCY)
Age: 54
Discharge: LEFT BEFORE DISCHARGE | End: 2024-04-09
Payer: COMMERCIAL

## 2024-04-09 VITALS
DIASTOLIC BLOOD PRESSURE: 100 MMHG | RESPIRATION RATE: 16 BRPM | TEMPERATURE: 98 F | HEART RATE: 95 BPM | SYSTOLIC BLOOD PRESSURE: 172 MMHG | WEIGHT: 146 LBS | BODY MASS INDEX: 22.87 KG/M2 | OXYGEN SATURATION: 96 %

## 2024-04-09 DIAGNOSIS — T20.20XA PARTIAL THICKNESS BURN OF HEAD, INITIAL ENCOUNTER: Primary | ICD-10-CM

## 2024-04-09 PROCEDURE — 2580000003 HC RX 258: Performed by: NURSE PRACTITIONER

## 2024-04-09 PROCEDURE — 96374 THER/PROPH/DIAG INJ IV PUSH: CPT

## 2024-04-09 PROCEDURE — 96375 TX/PRO/DX INJ NEW DRUG ADDON: CPT

## 2024-04-09 PROCEDURE — 99284 EMERGENCY DEPT VISIT MOD MDM: CPT

## 2024-04-09 PROCEDURE — 6370000000 HC RX 637 (ALT 250 FOR IP): Performed by: NURSE PRACTITIONER

## 2024-04-09 PROCEDURE — 96361 HYDRATE IV INFUSION ADD-ON: CPT

## 2024-04-09 PROCEDURE — 6360000002 HC RX W HCPCS: Performed by: NURSE PRACTITIONER

## 2024-04-09 PROCEDURE — 96376 TX/PRO/DX INJ SAME DRUG ADON: CPT

## 2024-04-09 RX ORDER — ONDANSETRON 2 MG/ML
4 INJECTION INTRAMUSCULAR; INTRAVENOUS ONCE
Status: COMPLETED | OUTPATIENT
Start: 2024-04-09 | End: 2024-04-09

## 2024-04-09 RX ORDER — CEPHALEXIN 500 MG/1
500 CAPSULE ORAL 4 TIMES DAILY
Qty: 28 CAPSULE | Refills: 0 | Status: SHIPPED | OUTPATIENT
Start: 2024-04-09 | End: 2024-04-16

## 2024-04-09 RX ORDER — 0.9 % SODIUM CHLORIDE 0.9 %
1000 INTRAVENOUS SOLUTION INTRAVENOUS ONCE
Status: COMPLETED | OUTPATIENT
Start: 2024-04-09 | End: 2024-04-09

## 2024-04-09 RX ORDER — GINSENG 100 MG
CAPSULE ORAL ONCE
Status: DISCONTINUED | OUTPATIENT
Start: 2024-04-09 | End: 2024-04-09

## 2024-04-09 RX ORDER — GINSENG 100 MG
CAPSULE ORAL
Qty: 28 G | Refills: 3 | Status: SHIPPED | OUTPATIENT
Start: 2024-04-09 | End: 2024-04-19

## 2024-04-09 RX ORDER — HYDROCODONE BITARTRATE AND ACETAMINOPHEN 5; 325 MG/1; MG/1
1 TABLET ORAL EVERY 6 HOURS PRN
Qty: 4 TABLET | Refills: 0 | Status: SHIPPED | OUTPATIENT
Start: 2024-04-09 | End: 2024-04-10

## 2024-04-09 RX ORDER — GINSENG 100 MG
CAPSULE ORAL ONCE
Status: COMPLETED | OUTPATIENT
Start: 2024-04-09 | End: 2024-04-09

## 2024-04-09 RX ADMIN — BACITRACIN: 500 OINTMENT TOPICAL at 16:51

## 2024-04-09 RX ADMIN — WATER 2000 MG: 1 INJECTION INTRAMUSCULAR; INTRAVENOUS; SUBCUTANEOUS at 18:10

## 2024-04-09 RX ADMIN — ONDANSETRON 4 MG: 2 INJECTION INTRAMUSCULAR; INTRAVENOUS at 17:05

## 2024-04-09 RX ADMIN — HYDROMORPHONE HYDROCHLORIDE 1 MG: 1 INJECTION, SOLUTION INTRAMUSCULAR; INTRAVENOUS; SUBCUTANEOUS at 17:05

## 2024-04-09 RX ADMIN — SODIUM CHLORIDE 1000 ML: 9 INJECTION, SOLUTION INTRAVENOUS at 17:05

## 2024-04-09 RX ADMIN — HYDROMORPHONE HYDROCHLORIDE 0.5 MG: 1 INJECTION, SOLUTION INTRAMUSCULAR; INTRAVENOUS; SUBCUTANEOUS at 18:09

## 2024-04-09 ASSESSMENT — PAIN DESCRIPTION - PAIN TYPE: TYPE: ACUTE PAIN

## 2024-04-09 ASSESSMENT — PAIN - FUNCTIONAL ASSESSMENT
PAIN_FUNCTIONAL_ASSESSMENT: 0-10
PAIN_FUNCTIONAL_ASSESSMENT: 0-10

## 2024-04-09 ASSESSMENT — PAIN SCALES - GENERAL
PAINLEVEL_OUTOF10: 7
PAINLEVEL_OUTOF10: 10

## 2024-04-09 ASSESSMENT — PAIN DESCRIPTION - LOCATION: LOCATION: FACE;HEAD

## 2024-04-09 ASSESSMENT — PAIN DESCRIPTION - DESCRIPTORS: DESCRIPTORS: BURNING
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
## 2024-04-09 NOTE — DISCHARGE INSTRUCTIONS
If given narcotics (opiates) during this Emergency Department visit, you should not drink, drive or operate any machinery for at least 6 hours.    Take the medication as prescribed.  Apply the cream to any of the open blisters 2 times per day and keep covered with a dry sterile dressing.      For pain use ibuprofen (Motrin / Advil) or acetaminophen (Tylenol), unless prescribed medications that have acetaminophen in it.  You can take over the counter acetaminophen tablets (1 - 2 tablets of the 500-mg strength every 6 hours) or ibuprofen tablets (2 tablets every 4 hours).    PLEASE RETURN TO THE EMERGENCY DEPARTMENT IMMEDIATELY for worsening symptoms, white drainage from the wound, redness or streaking, or if you develop any concerning symptoms such as: high fever not relieved by acetaminophen (Tylenol) and/or ibuprofen (Motrin / Advil), chills, shortness of breath, chest pain, feeling of your heart fluttering or racing, persistent nausea and/or vomiting, numbness, weakness or tingling in the arms or legs or change in color of the extremities, changes in mental status, persistent headache, blurry vision, unable to follow up with your physician, or other any other care or concern.

## 2024-04-10 ASSESSMENT — ENCOUNTER SYMPTOMS
FACIAL SWELLING: 0
DIARRHEA: 0
SHORTNESS OF BREATH: 0
VOMITING: 0
CONSTIPATION: 0
ABDOMINAL PAIN: 0
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
## 2024-04-18 DIAGNOSIS — T31.80 BURNS INVOLV 80-89% OF BODY SURFACE W/LESS THAN 10% THIRD DEGREE BURNS: Primary | ICD-10-CM

## 2024-04-18 RX ORDER — BACITRACIN ZINC AND POLYMYXIN B SULFATE 500; 1000 [USP'U]/G; [USP'U]/G
OINTMENT TOPICAL
Qty: 1 EACH | Refills: 1 | Status: SHIPPED | OUTPATIENT
Start: 2024-04-18 | End: 2024-04-25

## 2024-04-18 RX ORDER — OXYCODONE HYDROCHLORIDE AND ACETAMINOPHEN 5; 325 MG/1; MG/1
1 TABLET ORAL EVERY 8 HOURS PRN
Qty: 16 TABLET | Refills: 0 | Status: SHIPPED | OUTPATIENT
Start: 2024-04-18 | End: 2024-04-25

## 2025-05-16 ENCOUNTER — HOSPITAL ENCOUNTER (EMERGENCY)
Age: 55
Discharge: LEFT BEFORE DISCHARGE | End: 2025-05-16
Attending: EMERGENCY MEDICINE
Payer: MEDICAID

## 2025-05-16 ENCOUNTER — APPOINTMENT (OUTPATIENT)
Dept: GENERAL RADIOLOGY | Age: 55
End: 2025-05-16
Payer: MEDICAID

## 2025-05-16 VITALS
RESPIRATION RATE: 22 BRPM | DIASTOLIC BLOOD PRESSURE: 109 MMHG | BODY MASS INDEX: 23.54 KG/M2 | HEIGHT: 67 IN | OXYGEN SATURATION: 95 % | WEIGHT: 150 LBS | TEMPERATURE: 98.1 F | SYSTOLIC BLOOD PRESSURE: 179 MMHG | HEART RATE: 79 BPM

## 2025-05-16 DIAGNOSIS — R06.09 EXERTIONAL DYSPNEA: Primary | ICD-10-CM

## 2025-05-16 DIAGNOSIS — R94.31 ABNORMAL EKG: ICD-10-CM

## 2025-05-16 LAB
ANION GAP SERPL CALC-SCNC: 14 MEQ/L (ref 8–16)
BASOPHILS ABSOLUTE: 0.1 THOU/MM3 (ref 0–0.1)
BASOPHILS NFR BLD AUTO: 0.9 %
BUN SERPL-MCNC: 36 MG/DL (ref 8–23)
CALCIUM SERPL-MCNC: 8.7 MG/DL (ref 8.6–10)
CHLORIDE SERPL-SCNC: 104 MEQ/L (ref 98–111)
CO2 SERPL-SCNC: 19 MEQ/L (ref 22–29)
CREAT SERPL-MCNC: 2.5 MG/DL (ref 0.7–1.2)
D DIMER PPP IA.FEU-MCNC: 320 NG/ML FEU (ref 0–500)
DEPRECATED RDW RBC AUTO: 46.6 FL (ref 35–45)
EKG ATRIAL RATE: 261 BPM
EKG ATRIAL RATE: 83 BPM
EKG P AXIS: 21 DEGREES
EKG P AXIS: 30 DEGREES
EKG P-R INTERVAL: 146 MS
EKG Q-T INTERVAL: 402 MS
EKG Q-T INTERVAL: 418 MS
EKG QRS DURATION: 86 MS
EKG QRS DURATION: 88 MS
EKG QTC CALCULATION (BAZETT): 483 MS
EKG QTC CALCULATION (BAZETT): 491 MS
EKG R AXIS: -43 DEGREES
EKG R AXIS: -53 DEGREES
EKG T AXIS: 111 DEGREES
EKG T AXIS: 94 DEGREES
EKG VENTRICULAR RATE: 83 BPM
EKG VENTRICULAR RATE: 87 BPM
EOSINOPHIL NFR BLD AUTO: 0.6 %
EOSINOPHILS ABSOLUTE: 0.1 THOU/MM3 (ref 0–0.4)
ERYTHROCYTE [DISTWIDTH] IN BLOOD BY AUTOMATED COUNT: 16.3 % (ref 11.5–14.5)
GFR SERPL CREATININE-BSD FRML MDRD: 30 ML/MIN/1.73M2
GLUCOSE SERPL-MCNC: 175 MG/DL (ref 74–109)
HCT VFR BLD AUTO: 37.4 % (ref 42–52)
HGB BLD-MCNC: 11.9 GM/DL (ref 14–18)
IMM GRANULOCYTES # BLD AUTO: 0.02 THOU/MM3 (ref 0–0.07)
IMM GRANULOCYTES NFR BLD AUTO: 0.2 %
LYMPHOCYTES ABSOLUTE: 1 THOU/MM3 (ref 1–4.8)
LYMPHOCYTES NFR BLD AUTO: 11.2 %
MAGNESIUM SERPL-MCNC: 2.2 MG/DL (ref 1.6–2.6)
MCH RBC QN AUTO: 25.2 PG (ref 26–33)
MCHC RBC AUTO-ENTMCNC: 31.8 GM/DL (ref 32.2–35.5)
MCV RBC AUTO: 79.1 FL (ref 80–94)
MONOCYTES ABSOLUTE: 0.8 THOU/MM3 (ref 0.4–1.3)
MONOCYTES NFR BLD AUTO: 9.8 %
NEUTROPHILS ABSOLUTE: 6.6 THOU/MM3 (ref 1.8–7.7)
NEUTROPHILS NFR BLD AUTO: 77.3 %
NRBC BLD AUTO-RTO: 0 /100 WBC
NT-PROBNP SERPL IA-MCNC: 3954 PG/ML (ref 0–124)
OSMOLALITY SERPL CALC.SUM OF ELEC: 286.4 MOSMOL/KG (ref 275–300)
PLATELET # BLD AUTO: 319 THOU/MM3 (ref 130–400)
PMV BLD AUTO: 9.9 FL (ref 9.4–12.4)
POTASSIUM SERPL-SCNC: 4.2 MEQ/L (ref 3.5–5.2)
RBC # BLD AUTO: 4.73 MILL/MM3 (ref 4.7–6.1)
SODIUM SERPL-SCNC: 137 MEQ/L (ref 135–145)
TROPONIN, HIGH SENSITIVITY: 29 NG/L (ref 0–12)
TROPONIN, HIGH SENSITIVITY: 32 NG/L (ref 0–12)
WBC # BLD AUTO: 8.5 THOU/MM3 (ref 4.8–10.8)

## 2025-05-16 PROCEDURE — 83880 ASSAY OF NATRIURETIC PEPTIDE: CPT

## 2025-05-16 PROCEDURE — 85379 FIBRIN DEGRADATION QUANT: CPT

## 2025-05-16 PROCEDURE — 93010 ELECTROCARDIOGRAM REPORT: CPT | Performed by: INTERNAL MEDICINE

## 2025-05-16 PROCEDURE — 93005 ELECTROCARDIOGRAM TRACING: CPT | Performed by: EMERGENCY MEDICINE

## 2025-05-16 PROCEDURE — 99285 EMERGENCY DEPT VISIT HI MDM: CPT

## 2025-05-16 PROCEDURE — 85025 COMPLETE CBC W/AUTO DIFF WBC: CPT

## 2025-05-16 PROCEDURE — 93005 ELECTROCARDIOGRAM TRACING: CPT | Performed by: NURSE PRACTITIONER

## 2025-05-16 PROCEDURE — 84484 ASSAY OF TROPONIN QUANT: CPT

## 2025-05-16 PROCEDURE — 80048 BASIC METABOLIC PNL TOTAL CA: CPT

## 2025-05-16 PROCEDURE — 71045 X-RAY EXAM CHEST 1 VIEW: CPT

## 2025-05-16 PROCEDURE — 36415 COLL VENOUS BLD VENIPUNCTURE: CPT

## 2025-05-16 PROCEDURE — 83735 ASSAY OF MAGNESIUM: CPT
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
## 2025-05-16 NOTE — DISCHARGE INSTRUCTIONS
You have elected to leave AGAINST MEDICAL ADVICE.  At minimum, please follow-up with your primary care provider at the next available opportunity.  If at any time should symptoms fail to improve, worsen in any way, or should you change your mind and wish to proceed with our aforementioned plan of care, you are free to do so.

## 2025-05-16 NOTE — ED NOTES
Bedside report taken from Carla DENNIS, this nurse assuming care  Patient resting in bed. Respirations easy and unlabored. No distress noted. Call light within reach.

## 2025-05-16 NOTE — ED NOTES
Pt to ED via intake with c/o SOB that began two weeks ago. Pt reports that they think their lungs are filling up again. Pt reports that 2 years ago they have a thoracentesis. Pt denies chest pain. He denies having a productive cough. Pt respirations are labored at rest. Pt BP hypertensive. Pt reports he does use cocaine every other day and smokes marijuana daily. EKG completed. IV inserted.

## 2025-06-18 ENCOUNTER — HOSPITAL ENCOUNTER (INPATIENT)
Age: 55
LOS: 4 days | Discharge: HOME | DRG: 816 | End: 2025-06-22
Attending: PHYSICIAN ASSISTANT | Admitting: INTERNAL MEDICINE
Payer: MEDICAID

## 2025-06-18 ENCOUNTER — APPOINTMENT (OUTPATIENT)
Dept: GENERAL RADIOLOGY | Age: 55
DRG: 816 | End: 2025-06-18
Payer: MEDICAID

## 2025-06-18 ENCOUNTER — APPOINTMENT (OUTPATIENT)
Age: 55
DRG: 816 | End: 2025-06-18
Attending: PHYSICIAN ASSISTANT
Payer: MEDICAID

## 2025-06-18 DIAGNOSIS — N18.31 STAGE 3A CHRONIC KIDNEY DISEASE (HCC): ICD-10-CM

## 2025-06-18 DIAGNOSIS — I50.9 CONGESTIVE HEART FAILURE, UNSPECIFIED HF CHRONICITY, UNSPECIFIED HEART FAILURE TYPE (HCC): Primary | ICD-10-CM

## 2025-06-18 PROBLEM — I16.1 HYPERTENSIVE EMERGENCY: Status: ACTIVE | Noted: 2025-06-18

## 2025-06-18 LAB
ALBUMIN SERPL BCG-MCNC: 4 G/DL (ref 3.4–4.9)
ALP SERPL-CCNC: 182 U/L (ref 40–129)
ALT SERPL W/O P-5'-P-CCNC: 77 U/L (ref 10–50)
ANION GAP SERPL CALC-SCNC: 12 MEQ/L (ref 8–16)
AST SERPL-CCNC: 87 U/L (ref 10–50)
BACTERIA URNS QL MICRO: ABNORMAL /HPF
BASOPHILS ABSOLUTE: 0.1 THOU/MM3 (ref 0–0.1)
BASOPHILS NFR BLD AUTO: 0.8 %
BILIRUB SERPL-MCNC: 0.4 MG/DL (ref 0.3–1.2)
BILIRUB UR QL STRIP.AUTO: NEGATIVE
BUN SERPL-MCNC: 31 MG/DL (ref 8–23)
CALCIUM SERPL-MCNC: 9.5 MG/DL (ref 8.6–10)
CASTS #/AREA URNS LPF: ABNORMAL /LPF
CASTS 2: ABNORMAL /LPF
CHARACTER UR: CLEAR
CHLORIDE SERPL-SCNC: 103 MEQ/L (ref 98–111)
CO2 SERPL-SCNC: 24 MEQ/L (ref 22–29)
COLOR, UA: YELLOW
CREAT SERPL-MCNC: 1.6 MG/DL (ref 0.7–1.2)
CREAT UR-MCNC: 32.9 MG/DL
CRYSTALS URNS MICRO: ABNORMAL
DEPRECATED RDW RBC AUTO: 45.1 FL (ref 35–45)
ECHO AO ASC DIAM: 3.3 CM
ECHO AO ASCENDING AORTA INDEX: 1.84 CM/M2
ECHO AR MAX VEL PISA: 5.1 M/S
ECHO AV CUSP MM: 1.8 CM
ECHO AV PEAK GRADIENT: 9 MMHG
ECHO AV PEAK VELOCITY: 1.5 M/S
ECHO AV REGURGITANT PHT: 545 MS
ECHO AV VELOCITY RATIO: 0.73
ECHO BSA: 1.79 M2
ECHO EST RA PRESSURE: 3 MMHG
ECHO LA AREA 2C: 30.3 CM2
ECHO LA AREA 4C: 30.7 CM2
ECHO LA DIAMETER INDEX: 2.57 CM/M2
ECHO LA DIAMETER: 4.6 CM
ECHO LA MAJOR AXIS: 6.5 CM
ECHO LA MINOR AXIS: 7 CM
ECHO LA VOL BP: 117 ML (ref 18–58)
ECHO LA VOL MOD A2C: 108 ML (ref 18–58)
ECHO LA VOL MOD A4C: 118 ML (ref 18–58)
ECHO LA VOL/BSA BIPLANE: 65 ML/M2 (ref 16–34)
ECHO LA VOLUME INDEX MOD A2C: 60 ML/M2 (ref 16–34)
ECHO LA VOLUME INDEX MOD A4C: 66 ML/M2 (ref 16–34)
ECHO LV E' LATERAL VELOCITY: 6.9 CM/S
ECHO LV E' SEPTAL VELOCITY: 5.1 CM/S
ECHO LV EDV A2C: 163 ML
ECHO LV EDV A4C: 209 ML
ECHO LV EDV INDEX A4C: 117 ML/M2
ECHO LV EDV NDEX A2C: 91 ML/M2
ECHO LV EF PHYSICIAN: 30 %
ECHO LV EJECTION FRACTION A2C: 30 %
ECHO LV EJECTION FRACTION A4C: 32 %
ECHO LV EJECTION FRACTION BIPLANE: 31 % (ref 55–100)
ECHO LV ESV A2C: 115 ML
ECHO LV ESV A4C: 144 ML
ECHO LV ESV INDEX A2C: 64 ML/M2
ECHO LV ESV INDEX A4C: 80 ML/M2
ECHO LV FRACTIONAL SHORTENING: 17 % (ref 28–44)
ECHO LV INTERNAL DIMENSION DIASTOLE INDEX: 3.02 CM/M2
ECHO LV INTERNAL DIMENSION DIASTOLIC: 5.4 CM (ref 4.2–5.9)
ECHO LV INTERNAL DIMENSION SYSTOLIC INDEX: 2.51 CM/M2
ECHO LV INTERNAL DIMENSION SYSTOLIC: 4.5 CM
ECHO LV ISOVOLUMETRIC RELAXATION TIME (IVRT): 70 MS
ECHO LV IVSD: 1.4 CM (ref 0.6–1)
ECHO LV MASS 2D: 362.7 G (ref 88–224)
ECHO LV MASS INDEX 2D: 202.6 G/M2 (ref 49–115)
ECHO LV POSTERIOR WALL DIASTOLIC: 1.6 CM (ref 0.6–1)
ECHO LV RELATIVE WALL THICKNESS RATIO: 0.59
ECHO LVOT PEAK GRADIENT: 4 MMHG
ECHO LVOT PEAK VELOCITY: 1.1 M/S
ECHO MV A VELOCITY: 0.78 M/S
ECHO MV E DECELERATION TIME (DT): 211 MS
ECHO MV E VELOCITY: 1.19 M/S
ECHO MV E/A RATIO: 1.53
ECHO MV E/E' LATERAL: 17.25
ECHO MV E/E' RATIO (AVERAGED): 20.29
ECHO MV E/E' SEPTAL: 23.33
ECHO MV REGURGITANT PEAK GRADIENT: 77 MMHG
ECHO MV REGURGITANT PEAK VELOCITY: 4.4 M/S
ECHO PULMONARY ARTERY END DIASTOLIC PRESSURE: 8 MMHG
ECHO PV MAX VELOCITY: 0.9 M/S
ECHO PV PEAK GRADIENT: 3 MMHG
ECHO PV REGURGITANT MAX VELOCITY: 1.4 M/S
ECHO RIGHT VENTRICULAR SYSTOLIC PRESSURE (RVSP): 19 MMHG
ECHO RV INTERNAL DIMENSION: 2.4 CM
ECHO RV TAPSE: 2.7 CM (ref 1.7–?)
ECHO TV E WAVE: 0.3 M/S
ECHO TV REGURGITANT MAX VELOCITY: 1.98 M/S
ECHO TV REGURGITANT PEAK GRADIENT: 16 MMHG
EKG ATRIAL RATE: 82 BPM
EKG P AXIS: 31 DEGREES
EKG P-R INTERVAL: 146 MS
EKG Q-T INTERVAL: 410 MS
EKG QRS DURATION: 82 MS
EKG QTC CALCULATION (BAZETT): 479 MS
EKG R AXIS: -52 DEGREES
EKG T AXIS: 107 DEGREES
EKG VENTRICULAR RATE: 82 BPM
EOSINOPHIL NFR BLD AUTO: 1.4 %
EOSINOPHILS ABSOLUTE: 0.1 THOU/MM3 (ref 0–0.4)
EPITHELIAL CELLS, UA: ABNORMAL /HPF
ERYTHROCYTE [DISTWIDTH] IN BLOOD BY AUTOMATED COUNT: 16.1 % (ref 11.5–14.5)
ETHANOL SERPL-MCNC: < 0.01 % (ref 0–0.08)
FLUAV RNA RESP QL NAA+PROBE: NOT DETECTED
FLUBV RNA RESP QL NAA+PROBE: NOT DETECTED
GFR SERPL CREATININE-BSD FRML MDRD: 51 ML/MIN/1.73M2
GLUCOSE BLD STRIP.AUTO-MCNC: 126 MG/DL (ref 70–108)
GLUCOSE BLD STRIP.AUTO-MCNC: 222 MG/DL (ref 70–108)
GLUCOSE BLD STRIP.AUTO-MCNC: 301 MG/DL (ref 70–108)
GLUCOSE SERPL-MCNC: 81 MG/DL (ref 74–109)
GLUCOSE UR QL STRIP.AUTO: NEGATIVE MG/DL
HCT VFR BLD AUTO: 42.6 % (ref 42–52)
HGB BLD-MCNC: 13.1 GM/DL (ref 14–18)
HGB UR QL STRIP.AUTO: NEGATIVE
IMM GRANULOCYTES # BLD AUTO: 0.03 THOU/MM3 (ref 0–0.07)
IMM GRANULOCYTES NFR BLD AUTO: 0.4 %
KETONES UR QL STRIP.AUTO: NEGATIVE
LYMPHOCYTES ABSOLUTE: 1.2 THOU/MM3 (ref 1–4.8)
LYMPHOCYTES NFR BLD AUTO: 13.7 %
MCH RBC QN AUTO: 24.5 PG (ref 26–33)
MCHC RBC AUTO-ENTMCNC: 30.8 GM/DL (ref 32.2–35.5)
MCV RBC AUTO: 79.6 FL (ref 80–94)
MISCELLANEOUS 2: ABNORMAL
MONOCYTES ABSOLUTE: 0.7 THOU/MM3 (ref 0.4–1.3)
MONOCYTES NFR BLD AUTO: 8.2 %
NEUTROPHILS ABSOLUTE: 6.5 THOU/MM3 (ref 1.8–7.7)
NEUTROPHILS NFR BLD AUTO: 75.5 %
NITRITE UR QL STRIP: NEGATIVE
NRBC BLD AUTO-RTO: 0 /100 WBC
NT-PROBNP SERPL IA-MCNC: 3423 PG/ML (ref 0–124)
OSMOLALITY SERPL CALC.SUM OF ELEC: 283.1 MOSMOL/KG (ref 275–300)
PH UR STRIP.AUTO: 8 [PH] (ref 5–9)
PLATELET # BLD AUTO: 344 THOU/MM3 (ref 130–400)
PMV BLD AUTO: 9.7 FL (ref 9.4–12.4)
POTASSIUM SERPL-SCNC: 4.3 MEQ/L (ref 3.5–5.2)
PROT SERPL-MCNC: 6.6 G/DL (ref 6.4–8.3)
PROT UR STRIP.AUTO-MCNC: ABNORMAL MG/DL
PROT UR-MCNC: 17.2 MG/DL
PROT/CREAT 24H UR: 0.52 MG/G{CREAT}
RBC # BLD AUTO: 5.35 MILL/MM3 (ref 4.7–6.1)
RBC URINE: ABNORMAL /HPF
RENAL EPI CELLS #/AREA URNS HPF: ABNORMAL /[HPF]
SARS-COV-2 RNA RESP QL NAA+PROBE: NOT DETECTED
SODIUM SERPL-SCNC: 139 MEQ/L (ref 135–145)
SP GR UR REFRACT.AUTO: 1.01 (ref 1–1.03)
TROPONIN, HIGH SENSITIVITY: 29 NG/L (ref 0–12)
TROPONIN, HIGH SENSITIVITY: 30 NG/L (ref 0–12)
TROPONIN, HIGH SENSITIVITY: 30 NG/L (ref 0–12)
TROPONIN, HIGH SENSITIVITY: 31 NG/L (ref 0–12)
UROBILINOGEN, URINE: 0.2 EU/DL (ref 0–1)
WBC # BLD AUTO: 8.6 THOU/MM3 (ref 4.8–10.8)
WBC #/AREA URNS HPF: ABNORMAL /HPF
WBC #/AREA URNS HPF: NEGATIVE /[HPF]
YEAST LIKE FUNGI URNS QL MICRO: ABNORMAL

## 2025-06-18 PROCEDURE — 84156 ASSAY OF PROTEIN URINE: CPT

## 2025-06-18 PROCEDURE — 6360000002 HC RX W HCPCS

## 2025-06-18 PROCEDURE — 6370000000 HC RX 637 (ALT 250 FOR IP)

## 2025-06-18 PROCEDURE — 93306 TTE W/DOPPLER COMPLETE: CPT

## 2025-06-18 PROCEDURE — 87636 SARSCOV2 & INF A&B AMP PRB: CPT

## 2025-06-18 PROCEDURE — 2580000003 HC RX 258

## 2025-06-18 PROCEDURE — 6370000000 HC RX 637 (ALT 250 FOR IP): Performed by: PHYSICIAN ASSISTANT

## 2025-06-18 PROCEDURE — 93306 TTE W/DOPPLER COMPLETE: CPT | Performed by: INTERNAL MEDICINE

## 2025-06-18 PROCEDURE — 80053 COMPREHEN METABOLIC PANEL: CPT

## 2025-06-18 PROCEDURE — 6360000002 HC RX W HCPCS: Performed by: PHYSICIAN ASSISTANT

## 2025-06-18 PROCEDURE — 36415 COLL VENOUS BLD VENIPUNCTURE: CPT

## 2025-06-18 PROCEDURE — 93010 ELECTROCARDIOGRAM REPORT: CPT | Performed by: INTERNAL MEDICINE

## 2025-06-18 PROCEDURE — 93005 ELECTROCARDIOGRAM TRACING: CPT | Performed by: STUDENT IN AN ORGANIZED HEALTH CARE EDUCATION/TRAINING PROGRAM

## 2025-06-18 PROCEDURE — 2500000003 HC RX 250 WO HCPCS: Performed by: PHYSICIAN ASSISTANT

## 2025-06-18 PROCEDURE — 82570 ASSAY OF URINE CREATININE: CPT

## 2025-06-18 PROCEDURE — 99223 1ST HOSP IP/OBS HIGH 75: CPT | Performed by: PHYSICIAN ASSISTANT

## 2025-06-18 PROCEDURE — 71046 X-RAY EXAM CHEST 2 VIEWS: CPT

## 2025-06-18 PROCEDURE — 82077 ASSAY SPEC XCP UR&BREATH IA: CPT

## 2025-06-18 PROCEDURE — 82948 REAGENT STRIP/BLOOD GLUCOSE: CPT

## 2025-06-18 PROCEDURE — 83880 ASSAY OF NATRIURETIC PEPTIDE: CPT

## 2025-06-18 PROCEDURE — 96374 THER/PROPH/DIAG INJ IV PUSH: CPT

## 2025-06-18 PROCEDURE — 99285 EMERGENCY DEPT VISIT HI MDM: CPT

## 2025-06-18 PROCEDURE — 84484 ASSAY OF TROPONIN QUANT: CPT

## 2025-06-18 PROCEDURE — 85025 COMPLETE CBC W/AUTO DIFF WBC: CPT

## 2025-06-18 PROCEDURE — 2140000000 HC CCU INTERMEDIATE R&B

## 2025-06-18 PROCEDURE — 81001 URINALYSIS AUTO W/SCOPE: CPT

## 2025-06-18 RX ORDER — NITROGLYCERIN 0.4 MG/1
0.4 TABLET SUBLINGUAL ONCE
Status: COMPLETED | OUTPATIENT
Start: 2025-06-18 | End: 2025-06-18

## 2025-06-18 RX ORDER — MAGNESIUM SULFATE IN WATER 40 MG/ML
2000 INJECTION, SOLUTION INTRAVENOUS PRN
Status: DISCONTINUED | OUTPATIENT
Start: 2025-06-18 | End: 2025-06-22 | Stop reason: HOSPADM

## 2025-06-18 RX ORDER — POTASSIUM CHLORIDE 1500 MG/1
40 TABLET, EXTENDED RELEASE ORAL PRN
Status: DISCONTINUED | OUTPATIENT
Start: 2025-06-18 | End: 2025-06-22 | Stop reason: HOSPADM

## 2025-06-18 RX ORDER — INSULIN LISPRO 100 [IU]/ML
0-8 INJECTION, SOLUTION INTRAVENOUS; SUBCUTANEOUS
Status: DISCONTINUED | OUTPATIENT
Start: 2025-06-18 | End: 2025-06-22 | Stop reason: HOSPADM

## 2025-06-18 RX ORDER — ONDANSETRON 4 MG/1
4 TABLET, ORALLY DISINTEGRATING ORAL EVERY 8 HOURS PRN
Status: DISCONTINUED | OUTPATIENT
Start: 2025-06-18 | End: 2025-06-22 | Stop reason: HOSPADM

## 2025-06-18 RX ORDER — PHENOBARBITAL SODIUM 65 MG/ML
260 INJECTION, SOLUTION INTRAMUSCULAR; INTRAVENOUS
Status: DISCONTINUED | OUTPATIENT
Start: 2025-06-18 | End: 2025-06-22 | Stop reason: HOSPADM

## 2025-06-18 RX ORDER — SODIUM CHLORIDE 9 MG/ML
INJECTION, SOLUTION INTRAVENOUS PRN
Status: DISCONTINUED | OUTPATIENT
Start: 2025-06-18 | End: 2025-06-22 | Stop reason: HOSPADM

## 2025-06-18 RX ORDER — GLUCAGON 1 MG/ML
1 KIT INJECTION PRN
Status: DISCONTINUED | OUTPATIENT
Start: 2025-06-18 | End: 2025-06-22 | Stop reason: HOSPADM

## 2025-06-18 RX ORDER — DEXTROSE MONOHYDRATE 100 MG/ML
INJECTION, SOLUTION INTRAVENOUS CONTINUOUS PRN
Status: DISCONTINUED | OUTPATIENT
Start: 2025-06-18 | End: 2025-06-22 | Stop reason: HOSPADM

## 2025-06-18 RX ORDER — SODIUM CHLORIDE 0.9 % (FLUSH) 0.9 %
5-40 SYRINGE (ML) INJECTION EVERY 12 HOURS SCHEDULED
Status: DISCONTINUED | OUTPATIENT
Start: 2025-06-18 | End: 2025-06-22 | Stop reason: HOSPADM

## 2025-06-18 RX ORDER — LANOLIN ALCOHOL/MO/W.PET/CERES
100 CREAM (GRAM) TOPICAL DAILY
Status: DISCONTINUED | OUTPATIENT
Start: 2025-06-18 | End: 2025-06-22 | Stop reason: HOSPADM

## 2025-06-18 RX ORDER — ALBUTEROL SULFATE 90 UG/1
2 INHALANT RESPIRATORY (INHALATION) EVERY 6 HOURS PRN
Status: DISCONTINUED | OUTPATIENT
Start: 2025-06-18 | End: 2025-06-22 | Stop reason: HOSPADM

## 2025-06-18 RX ORDER — POLYETHYLENE GLYCOL 3350 17 G/17G
17 POWDER, FOR SOLUTION ORAL DAILY PRN
Status: DISCONTINUED | OUTPATIENT
Start: 2025-06-18 | End: 2025-06-22 | Stop reason: HOSPADM

## 2025-06-18 RX ORDER — LORAZEPAM 2 MG/ML
1 INJECTION INTRAMUSCULAR ONCE
Status: DISCONTINUED | OUTPATIENT
Start: 2025-06-18 | End: 2025-06-18

## 2025-06-18 RX ORDER — INSULIN LISPRO 100 [IU]/ML
0-8 INJECTION, SOLUTION INTRAVENOUS; SUBCUTANEOUS ONCE
Status: COMPLETED | OUTPATIENT
Start: 2025-06-18 | End: 2025-06-18

## 2025-06-18 RX ORDER — PHENOBARBITAL SODIUM 65 MG/ML
130 INJECTION, SOLUTION INTRAMUSCULAR; INTRAVENOUS
Status: DISCONTINUED | OUTPATIENT
Start: 2025-06-18 | End: 2025-06-22 | Stop reason: HOSPADM

## 2025-06-18 RX ORDER — FOLIC ACID 1 MG/1
1 TABLET ORAL DAILY
Status: DISCONTINUED | OUTPATIENT
Start: 2025-06-18 | End: 2025-06-22 | Stop reason: HOSPADM

## 2025-06-18 RX ORDER — HYDRALAZINE HYDROCHLORIDE 20 MG/ML
10 INJECTION INTRAMUSCULAR; INTRAVENOUS EVERY 6 HOURS PRN
Status: DISCONTINUED | OUTPATIENT
Start: 2025-06-18 | End: 2025-06-22 | Stop reason: HOSPADM

## 2025-06-18 RX ORDER — INSULIN LISPRO 100 [IU]/ML
0-8 INJECTION, SOLUTION INTRAVENOUS; SUBCUTANEOUS
Status: DISCONTINUED | OUTPATIENT
Start: 2025-06-18 | End: 2025-06-18

## 2025-06-18 RX ORDER — ACETAMINOPHEN 325 MG/1
650 TABLET ORAL EVERY 6 HOURS PRN
Status: DISCONTINUED | OUTPATIENT
Start: 2025-06-18 | End: 2025-06-22 | Stop reason: HOSPADM

## 2025-06-18 RX ORDER — MIDAZOLAM HYDROCHLORIDE 1 MG/ML
2 INJECTION, SOLUTION INTRAMUSCULAR; INTRAVENOUS ONCE
Status: COMPLETED | OUTPATIENT
Start: 2025-06-18 | End: 2025-06-18

## 2025-06-18 RX ORDER — NITROGLYCERIN 20 MG/100ML
5-200 INJECTION INTRAVENOUS CONTINUOUS
Status: DISCONTINUED | OUTPATIENT
Start: 2025-06-18 | End: 2025-06-21

## 2025-06-18 RX ORDER — BUMETANIDE 0.25 MG/ML
1 INJECTION, SOLUTION INTRAMUSCULAR; INTRAVENOUS 2 TIMES DAILY
Status: COMPLETED | OUTPATIENT
Start: 2025-06-18 | End: 2025-06-19

## 2025-06-18 RX ORDER — ACETAMINOPHEN 650 MG/1
650 SUPPOSITORY RECTAL EVERY 6 HOURS PRN
Status: DISCONTINUED | OUTPATIENT
Start: 2025-06-18 | End: 2025-06-22 | Stop reason: HOSPADM

## 2025-06-18 RX ORDER — INSULIN LISPRO 100 [IU]/ML
0-4 INJECTION, SOLUTION INTRAVENOUS; SUBCUTANEOUS
Status: DISCONTINUED | OUTPATIENT
Start: 2025-06-18 | End: 2025-06-18

## 2025-06-18 RX ORDER — AMLODIPINE BESYLATE 5 MG/1
5 TABLET ORAL DAILY
Status: DISCONTINUED | OUTPATIENT
Start: 2025-06-18 | End: 2025-06-19

## 2025-06-18 RX ORDER — MULTIVITAMIN WITH IRON
1 TABLET ORAL DAILY
Status: DISCONTINUED | OUTPATIENT
Start: 2025-06-18 | End: 2025-06-22 | Stop reason: HOSPADM

## 2025-06-18 RX ORDER — SODIUM CHLORIDE 0.9 % (FLUSH) 0.9 %
5-40 SYRINGE (ML) INJECTION PRN
Status: DISCONTINUED | OUTPATIENT
Start: 2025-06-18 | End: 2025-06-22 | Stop reason: HOSPADM

## 2025-06-18 RX ORDER — POTASSIUM CHLORIDE 7.45 MG/ML
10 INJECTION INTRAVENOUS PRN
Status: DISCONTINUED | OUTPATIENT
Start: 2025-06-18 | End: 2025-06-22 | Stop reason: HOSPADM

## 2025-06-18 RX ORDER — ENOXAPARIN SODIUM 100 MG/ML
40 INJECTION SUBCUTANEOUS DAILY
Status: DISCONTINUED | OUTPATIENT
Start: 2025-06-18 | End: 2025-06-22 | Stop reason: HOSPADM

## 2025-06-18 RX ORDER — ONDANSETRON 2 MG/ML
4 INJECTION INTRAMUSCULAR; INTRAVENOUS EVERY 6 HOURS PRN
Status: DISCONTINUED | OUTPATIENT
Start: 2025-06-18 | End: 2025-06-22 | Stop reason: HOSPADM

## 2025-06-18 RX ADMIN — BUMETANIDE 1 MG: 0.25 INJECTION INTRAMUSCULAR; INTRAVENOUS at 17:18

## 2025-06-18 RX ADMIN — AMLODIPINE BESYLATE 5 MG: 5 TABLET ORAL at 17:44

## 2025-06-18 RX ADMIN — NITROGLYCERIN 15 MCG/MIN: 5 INJECTION, SOLUTION INTRAVENOUS at 14:22

## 2025-06-18 RX ADMIN — Medication 100 MG: at 16:02

## 2025-06-18 RX ADMIN — Medication 1 TABLET: at 16:02

## 2025-06-18 RX ADMIN — ENOXAPARIN SODIUM 40 MG: 100 INJECTION SUBCUTANEOUS at 20:21

## 2025-06-18 RX ADMIN — ACETAMINOPHEN 650 MG: 325 TABLET ORAL at 20:21

## 2025-06-18 RX ADMIN — INSULIN LISPRO 2 UNITS: 100 INJECTION, SOLUTION INTRAVENOUS; SUBCUTANEOUS at 21:49

## 2025-06-18 RX ADMIN — NITROGLYCERIN 40 MCG/MIN: 5 INJECTION, SOLUTION INTRAVENOUS at 16:02

## 2025-06-18 RX ADMIN — NITROGLYCERIN 30 MCG/MIN: 5 INJECTION, SOLUTION INTRAVENOUS at 15:18

## 2025-06-18 RX ADMIN — NITROGLYCERIN 60 MCG/MIN: 5 INJECTION, SOLUTION INTRAVENOUS at 17:05

## 2025-06-18 RX ADMIN — NITROGLYCERIN 20 MCG/MIN: 5 INJECTION, SOLUTION INTRAVENOUS at 14:45

## 2025-06-18 RX ADMIN — INSULIN LISPRO 6 UNITS: 100 INJECTION, SOLUTION INTRAVENOUS; SUBCUTANEOUS at 14:20

## 2025-06-18 RX ADMIN — MIDAZOLAM 2 MG: 1 INJECTION INTRAMUSCULAR; INTRAVENOUS at 10:13

## 2025-06-18 RX ADMIN — NITROGLYCERIN 0.4 MG: 0.4 TABLET SUBLINGUAL at 08:23

## 2025-06-18 RX ADMIN — PHENOBARBITAL SODIUM 130 MG: 65 INJECTION INTRAMUSCULAR; INTRAVENOUS at 21:49

## 2025-06-18 RX ADMIN — SODIUM CHLORIDE, PRESERVATIVE FREE 10 ML: 5 INJECTION INTRAVENOUS at 20:21

## 2025-06-18 RX ADMIN — NITROGLYCERIN 5 MCG/MIN: 5 INJECTION, SOLUTION INTRAVENOUS at 12:27

## 2025-06-18 RX ADMIN — NITROGLYCERIN 10 MCG/MIN: 5 INJECTION, SOLUTION INTRAVENOUS at 13:56

## 2025-06-18 RX ADMIN — HYDRALAZINE HYDROCHLORIDE 10 MG: 20 INJECTION INTRAMUSCULAR; INTRAVENOUS at 17:18

## 2025-06-18 RX ADMIN — FOLIC ACID 1 MG: 1 TABLET ORAL at 16:02

## 2025-06-18 RX ADMIN — NITROGLYCERIN 50 MCG/MIN: 5 INJECTION, SOLUTION INTRAVENOUS at 16:35

## 2025-06-18 ASSESSMENT — PAIN - FUNCTIONAL ASSESSMENT
PAIN_FUNCTIONAL_ASSESSMENT: ACTIVITIES ARE NOT PREVENTED
PAIN_FUNCTIONAL_ASSESSMENT: NONE - DENIES PAIN

## 2025-06-18 ASSESSMENT — PAIN SCALES - GENERAL
PAINLEVEL_OUTOF10: 6
PAINLEVEL_OUTOF10: 6
PAINLEVEL_OUTOF10: 7

## 2025-06-18 ASSESSMENT — PAIN DESCRIPTION - DESCRIPTORS: DESCRIPTORS: CRAMPING

## 2025-06-18 ASSESSMENT — LIFESTYLE VARIABLES
HOW MANY STANDARD DRINKS CONTAINING ALCOHOL DO YOU HAVE ON A TYPICAL DAY: 5 OR 6
HOW OFTEN DO YOU HAVE A DRINK CONTAINING ALCOHOL: 4 OR MORE TIMES A WEEK

## 2025-06-18 ASSESSMENT — PATIENT HEALTH QUESTIONNAIRE - PHQ9
2. FEELING DOWN, DEPRESSED OR HOPELESS: NOT AT ALL
SUM OF ALL RESPONSES TO PHQ QUESTIONS 1-9: 0
1. LITTLE INTEREST OR PLEASURE IN DOING THINGS: NOT AT ALL
SUM OF ALL RESPONSES TO PHQ QUESTIONS 1-9: 0

## 2025-06-18 ASSESSMENT — PAIN DESCRIPTION - ORIENTATION: ORIENTATION: RIGHT;LEFT

## 2025-06-18 ASSESSMENT — PAIN DESCRIPTION - LOCATION: LOCATION: LEG

## 2025-06-18 ASSESSMENT — PAIN DESCRIPTION - ONSET: ONSET: ON-GOING

## 2025-06-18 NOTE — ED NOTES
Pt resting in bed with call light in reach and lights dimmed. Pt voices no concerns at this time. VSS.

## 2025-06-18 NOTE — PLAN OF CARE
Problem: Chronic Conditions and Co-morbidities  Goal: Patient's chronic conditions and co-morbidity symptoms are monitored and maintained or improved  Outcome: Progressing     Problem: Discharge Planning  Goal: Discharge to home or other facility with appropriate resources  Outcome: Progressing     Problem: Seizure Precautions  Goal: Remains free of injury related to seizures activity  Outcome: Progressing

## 2025-06-18 NOTE — CONSULTS
Brief Intervention and Referral to Treatment Summary    Patient was provided PHQ-9, AUDIT-C and DAST Screening:      PHQ-9 Score: 0  AUDIT-C Score:  10  DAST Score:  4    Patient’s substance use is considered     Dependent      Patient’s depression is considered:     Minimal    Brief Education Was Provided    Patient was not receptive      Brief Intervention(s) Provided  at time of screening(Only for AUDIT-C or DAST)     Document Brief Intervention (corresponds directly with the 5 A's, Ask, Advise, Assess, Assist, and Arrange): Use examples below.  NA is not to be used.        Patient admitted to drinking six alcoholic beverages on a daily basis. Patient scored 10 on AUDIT-C. Patient admits using marijuana and crack cocaine on a weekly basis. Patient scored 4 on DAST. Patient denies family support. Patient denied interest to decrease use of alcohol and drugs. Patient declined use of current outpatient provider.  continued to offer resource packet; patient declined.  encouraged patient to inform nurse of change; once informed a  will return to assist patient needs.  informed patient of follow up call that will be completed after discharge. Patient declines having active phone number; unable to complete discharge follow up call.     At- Risk Patients (Score 7-15 for women; 8-15 for men) MUST HAVE A BRIEF INTERVENTION IDENTIFIED  Discuss concern patient is drinking at unhealthy levels known to increase risk of alcohol-related health problems.    Is Patient ready to commit to change? NO    If No:  Encourage reflection  Discuss short term and long term health risks of consuming alcohol  Barriers to change  Reaffirm willingness to help / Educational or Resource materials provided    If Yes: (Score 7-15 for women; 8-15 for men)  Set goal  Plan  Educational and Resource materials provided  Offer to call with the patient to schedule appointment with a counseling

## 2025-06-18 NOTE — H&P
Hospitalist History & Physical    Patient:  Apolinar Posey    Unit/Bed:3A-07/007-A  YOB: 1970  MRN: [id number]   PCP: Ruthy Vasquez APRN - CNP  Code Status: Full Code    Date of Service: The patient was seen and examined on 06/18/25 and admitted to Inpatient with an expected length of stay of greater than two midnights due to medical therapy.     Chief Complaint: Shortness of breath    Assessment/Plan:    Hypertensive emergency  Systolic blood pressure in the 170s to 190s in the emergency department.  Pulmonary edema present on chest x-ray.  Patient noncompliant with home antihypertensive regimen.  Also uses cocaine.  Last use yesterday.  Continue nitro infusion.  Will target a goal systolic blood pressure of less than 160 today.  Consider initiating oral antihypertensives tomorrow morning.  Pulmonary edema  Likely secondary to hypertensive emergency, CHF.  Nitro infusion as above.  Will give Bumex 1 mg twice daily x 2 doses and reassess tomorrow morning.  Non-insulin-dependent type 2 diabetes mellitus  Glucose initially 81, repeat Accu-Chek 301.  Not currently on any medications outpatient.  Start medium sliding scale.  May need to consider long-acting insulin pending trend of Accu-Cheks.  Carb controlled diet.  Elevated creatinine  Creatinine elevated at 1.6.  Per chart review, appears this has been chronically elevated in the past per  Suspect underlying component of CKD, likely secondary to longstanding hypertension and underlying diabetes.  UA with microscopy with trace protein.  Check urine protein to creatinine ratio.  Trend daily BMP.  Wheezing, chronic cough  Suspect underlying COPD.  Trial of Stiolto with albuterol as needed.  Patient will benefit from outpatient pulmonology follow-up.  Heart failure in acute exacerbation  Check TTE.  No echocardiogram on file.  Bumex 1 mg twice daily x 2 doses as above.  Strict ins and outs. Daily weights. Low sodium diet. Fluid

## 2025-06-18 NOTE — ED TRIAGE NOTES
Pt presents to ED from home via EMS w/ c/o SOB. Pt reports no hx of respiratory issues. Respirations even and unlabored. EKG completed at this time. Pt oxygen is 99% on room air upon arrival. Pt states SOB \"started awhile ago and it keeps getting worser and worser\". Pt reports SOB is worse when he attempts to lay. Pt states he was unable to sleep last night d/t SOB.

## 2025-06-18 NOTE — ED NOTES
ED to inpatient nurses report      Chief Complaint:  Chief Complaint   Patient presents with    Shortness of Breath     Present to ED from: Home     MOA:     LOC: alert and orientated to name, place, date  Mobility: Requires assistance * 1  Oxygen Baseline: RA    Current needs required: RA     Code Status:   Full Code    What abnormal results were found and what did you give/do to treat them? Hypertension  Any procedures or intervention occur? Medications    Mental Status:  Level of Consciousness: Alert (0)    Psych Assessment:        Vitals:  Patient Vitals for the past 24 hrs:   BP Temp Temp src Pulse Resp SpO2 Height Weight   06/18/25 1120 (!) 179/119 -- -- 81 20 97 % -- --   06/18/25 1012 (!) 184/126 -- -- 81 20 99 % -- --   06/18/25 0902 (!) 180/123 -- -- 75 22 94 % -- --   06/18/25 0825 -- -- -- 89 21 96 % -- --   06/18/25 0823 (!) 199/131 -- -- -- -- -- -- --   06/18/25 0823 (!) 199/131 -- -- 96 -- -- -- --   06/18/25 0805 (!) 182/120 -- -- 90 25 96 % 1.702 m (5' 7\") 68 kg (150 lb)   06/18/25 0650 (!) 179/107 98.3 °F (36.8 °C) Oral 79 16 98 % -- --        LDAs:   Peripheral IV 06/18/25 Left Antecubital (Active)   Site Assessment Clean, dry & intact 06/18/25 0653   Line Status Flushed;Blood return noted;Specimen collected 06/18/25 0653       Ambulatory Status:  No data recorded    Diagnosis:  DISPOSITION Admitted 06/18/2025 12:09:38 PM   Final diagnoses:   Congestive heart failure, unspecified HF chronicity, unspecified heart failure type (HCC)        Consults:  None     Pain Score:  Pain Assessment  Pain Assessment: None - Denies Pain    C-SSRS:   Risk of Suicide: No Risk    Sepsis Screening:       [place] Fall Risk:       Swallow Screening        Preferred Language:   English      ALLERGIES     Aspirin and Motrin [ibuprofen micronized]    SURGICAL HISTORY       Past Surgical History:   Procedure Laterality Date    BRONCHOSCOPY N/A 2/9/2022    BRONCHOSCOPY WITH BIOPSY UNDER FLUORO performed by [facility]ANS

## 2025-06-19 PROBLEM — I50.9 CONGESTIVE HEART FAILURE (HCC): Status: ACTIVE | Noted: 2025-06-19

## 2025-06-19 LAB
ALBUMIN SERPL BCG-MCNC: 3.6 G/DL (ref 3.4–4.9)
ALP SERPL-CCNC: 170 U/L (ref 40–129)
ALT SERPL W/O P-5'-P-CCNC: 52 U/L (ref 10–50)
ANION GAP SERPL CALC-SCNC: 12 MEQ/L (ref 8–16)
AST SERPL-CCNC: 40 U/L (ref 10–50)
BASOPHILS ABSOLUTE: 0.1 THOU/MM3 (ref 0–0.1)
BASOPHILS NFR BLD AUTO: 0.6 %
BILIRUB CONJ SERPL-MCNC: 0.2 MG/DL (ref 0–0.2)
BILIRUB SERPL-MCNC: 0.3 MG/DL (ref 0.3–1.2)
BUN SERPL-MCNC: 32 MG/DL (ref 8–23)
CALCIUM SERPL-MCNC: 8.8 MG/DL (ref 8.6–10)
CHLORIDE SERPL-SCNC: 101 MEQ/L (ref 98–111)
CO2 SERPL-SCNC: 21 MEQ/L (ref 22–29)
CREAT SERPL-MCNC: 1.6 MG/DL (ref 0.7–1.2)
DEPRECATED MEAN GLUCOSE BLD GHB EST-ACNC: 123 MG/DL (ref 70–126)
DEPRECATED RDW RBC AUTO: 42.9 FL (ref 35–45)
EKG ATRIAL RATE: 88 BPM
EKG P AXIS: 41 DEGREES
EKG P-R INTERVAL: 146 MS
EKG Q-T INTERVAL: 412 MS
EKG QRS DURATION: 80 MS
EKG QTC CALCULATION (BAZETT): 498 MS
EKG R AXIS: -59 DEGREES
EKG T AXIS: 93 DEGREES
EKG VENTRICULAR RATE: 88 BPM
EOSINOPHIL NFR BLD AUTO: 0.3 %
EOSINOPHILS ABSOLUTE: 0 THOU/MM3 (ref 0–0.4)
ERYTHROCYTE [DISTWIDTH] IN BLOOD BY AUTOMATED COUNT: 15.8 % (ref 11.5–14.5)
GFR SERPL CREATININE-BSD FRML MDRD: 51 ML/MIN/1.73M2
GLUCOSE BLD STRIP.AUTO-MCNC: 101 MG/DL (ref 70–108)
GLUCOSE BLD STRIP.AUTO-MCNC: 136 MG/DL (ref 70–108)
GLUCOSE BLD STRIP.AUTO-MCNC: 147 MG/DL (ref 70–108)
GLUCOSE BLD STRIP.AUTO-MCNC: 253 MG/DL (ref 70–108)
GLUCOSE SERPL-MCNC: 157 MG/DL (ref 74–109)
HBA1C MFR BLD HPLC: 6.1 % (ref 4–6)
HCT VFR BLD AUTO: 42.4 % (ref 42–52)
HGB BLD-MCNC: 13.6 GM/DL (ref 14–18)
IMM GRANULOCYTES # BLD AUTO: 0.03 THOU/MM3 (ref 0–0.07)
IMM GRANULOCYTES NFR BLD AUTO: 0.3 %
LYMPHOCYTES ABSOLUTE: 0.9 THOU/MM3 (ref 1–4.8)
LYMPHOCYTES NFR BLD AUTO: 9.6 %
MCH RBC QN AUTO: 24.6 PG (ref 26–33)
MCHC RBC AUTO-ENTMCNC: 32.1 GM/DL (ref 32.2–35.5)
MCV RBC AUTO: 76.8 FL (ref 80–94)
MONOCYTES ABSOLUTE: 0.8 THOU/MM3 (ref 0.4–1.3)
MONOCYTES NFR BLD AUTO: 8.3 %
NEUTROPHILS ABSOLUTE: 7.8 THOU/MM3 (ref 1.8–7.7)
NEUTROPHILS NFR BLD AUTO: 80.9 %
NRBC BLD AUTO-RTO: 0 /100 WBC
PLATELET # BLD AUTO: 370 THOU/MM3 (ref 130–400)
PMV BLD AUTO: 10.2 FL (ref 9.4–12.4)
POTASSIUM SERPL-SCNC: 4.5 MEQ/L (ref 3.5–5.2)
PROT SERPL-MCNC: 6.2 G/DL (ref 6.4–8.3)
RBC # BLD AUTO: 5.52 MILL/MM3 (ref 4.7–6.1)
SODIUM SERPL-SCNC: 134 MEQ/L (ref 135–145)
WBC # BLD AUTO: 9.6 THOU/MM3 (ref 4.8–10.8)

## 2025-06-19 PROCEDURE — 6360000002 HC RX W HCPCS: Performed by: PHYSICIAN ASSISTANT

## 2025-06-19 PROCEDURE — 6370000000 HC RX 637 (ALT 250 FOR IP): Performed by: PHYSICIAN ASSISTANT

## 2025-06-19 PROCEDURE — 99233 SBSQ HOSP IP/OBS HIGH 50: CPT | Performed by: STUDENT IN AN ORGANIZED HEALTH CARE EDUCATION/TRAINING PROGRAM

## 2025-06-19 PROCEDURE — 94640 AIRWAY INHALATION TREATMENT: CPT

## 2025-06-19 PROCEDURE — 99223 1ST HOSP IP/OBS HIGH 75: CPT | Performed by: INTERNAL MEDICINE

## 2025-06-19 PROCEDURE — 82948 REAGENT STRIP/BLOOD GLUCOSE: CPT

## 2025-06-19 PROCEDURE — 93005 ELECTROCARDIOGRAM TRACING: CPT | Performed by: PHYSICIAN ASSISTANT

## 2025-06-19 PROCEDURE — 6360000002 HC RX W HCPCS: Performed by: STUDENT IN AN ORGANIZED HEALTH CARE EDUCATION/TRAINING PROGRAM

## 2025-06-19 PROCEDURE — 93010 ELECTROCARDIOGRAM REPORT: CPT | Performed by: INTERNAL MEDICINE

## 2025-06-19 PROCEDURE — 85025 COMPLETE CBC W/AUTO DIFF WBC: CPT

## 2025-06-19 PROCEDURE — 2500000003 HC RX 250 WO HCPCS: Performed by: PHYSICIAN ASSISTANT

## 2025-06-19 PROCEDURE — 2580000003 HC RX 258

## 2025-06-19 PROCEDURE — 2140000000 HC CCU INTERMEDIATE R&B

## 2025-06-19 PROCEDURE — 80076 HEPATIC FUNCTION PANEL: CPT

## 2025-06-19 PROCEDURE — 83036 HEMOGLOBIN GLYCOSYLATED A1C: CPT

## 2025-06-19 PROCEDURE — 6370000000 HC RX 637 (ALT 250 FOR IP): Performed by: STUDENT IN AN ORGANIZED HEALTH CARE EDUCATION/TRAINING PROGRAM

## 2025-06-19 PROCEDURE — 36415 COLL VENOUS BLD VENIPUNCTURE: CPT

## 2025-06-19 PROCEDURE — 6360000002 HC RX W HCPCS

## 2025-06-19 PROCEDURE — 80048 BASIC METABOLIC PNL TOTAL CA: CPT

## 2025-06-19 RX ORDER — BUMETANIDE 0.25 MG/ML
1 INJECTION, SOLUTION INTRAMUSCULAR; INTRAVENOUS 2 TIMES DAILY
Status: DISPENSED | OUTPATIENT
Start: 2025-06-19 | End: 2025-06-21

## 2025-06-19 RX ORDER — HYDRALAZINE HYDROCHLORIDE 25 MG/1
25 TABLET, FILM COATED ORAL EVERY 8 HOURS SCHEDULED
Status: DISCONTINUED | OUTPATIENT
Start: 2025-06-19 | End: 2025-06-20

## 2025-06-19 RX ORDER — AMLODIPINE BESYLATE 10 MG/1
10 TABLET ORAL DAILY
Status: DISCONTINUED | OUTPATIENT
Start: 2025-06-20 | End: 2025-06-22 | Stop reason: HOSPADM

## 2025-06-19 RX ORDER — HYDROCHLOROTHIAZIDE 25 MG/1
25 TABLET ORAL DAILY
Status: DISCONTINUED | OUTPATIENT
Start: 2025-06-19 | End: 2025-06-22 | Stop reason: HOSPADM

## 2025-06-19 RX ORDER — LISINOPRIL 5 MG/1
5 TABLET ORAL DAILY
Status: DISCONTINUED | OUTPATIENT
Start: 2025-06-19 | End: 2025-06-22 | Stop reason: HOSPADM

## 2025-06-19 RX ORDER — ATORVASTATIN CALCIUM 40 MG/1
40 TABLET, FILM COATED ORAL DAILY
Status: DISCONTINUED | OUTPATIENT
Start: 2025-06-19 | End: 2025-06-22 | Stop reason: HOSPADM

## 2025-06-19 RX ADMIN — Medication 1 TABLET: at 09:03

## 2025-06-19 RX ADMIN — HYDRALAZINE HYDROCHLORIDE 25 MG: 25 TABLET ORAL at 12:08

## 2025-06-19 RX ADMIN — ENOXAPARIN SODIUM 40 MG: 100 INJECTION SUBCUTANEOUS at 19:47

## 2025-06-19 RX ADMIN — HYDRALAZINE HYDROCHLORIDE 10 MG: 20 INJECTION INTRAMUSCULAR; INTRAVENOUS at 00:49

## 2025-06-19 RX ADMIN — NITROGLYCERIN 60 MCG/MIN: 5 INJECTION, SOLUTION INTRAVENOUS at 03:03

## 2025-06-19 RX ADMIN — BUMETANIDE 1 MG: 0.25 INJECTION INTRAMUSCULAR; INTRAVENOUS at 09:03

## 2025-06-19 RX ADMIN — TIOTROPIUM BROMIDE AND OLODATEROL 2 PUFF: 3.124; 2.736 SPRAY, METERED RESPIRATORY (INHALATION) at 07:45

## 2025-06-19 RX ADMIN — AMLODIPINE BESYLATE 5 MG: 5 TABLET ORAL at 09:03

## 2025-06-19 RX ADMIN — FOLIC ACID 1 MG: 1 TABLET ORAL at 09:03

## 2025-06-19 RX ADMIN — BUMETANIDE 1 MG: 0.25 INJECTION INTRAMUSCULAR; INTRAVENOUS at 18:26

## 2025-06-19 RX ADMIN — HYDRALAZINE HYDROCHLORIDE 25 MG: 25 TABLET ORAL at 21:04

## 2025-06-19 RX ADMIN — Medication 100 MG: at 09:03

## 2025-06-19 RX ADMIN — NITROGLYCERIN 90 MCG/MIN: 5 INJECTION, SOLUTION INTRAVENOUS at 16:15

## 2025-06-19 RX ADMIN — SODIUM CHLORIDE, PRESERVATIVE FREE 10 ML: 5 INJECTION INTRAVENOUS at 19:49

## 2025-06-19 RX ADMIN — PHENOBARBITAL SODIUM 130 MG: 65 INJECTION INTRAMUSCULAR; INTRAVENOUS at 04:26

## 2025-06-19 RX ADMIN — INSULIN LISPRO 4 UNITS: 100 INJECTION, SOLUTION INTRAVENOUS; SUBCUTANEOUS at 18:25

## 2025-06-19 RX ADMIN — ATORVASTATIN CALCIUM 40 MG: 40 TABLET, FILM COATED ORAL at 12:09

## 2025-06-19 ASSESSMENT — PAIN SCALES - GENERAL
PAINLEVEL_OUTOF10: 1
PAINLEVEL_OUTOF10: 0

## 2025-06-19 ASSESSMENT — PAIN DESCRIPTION - ORIENTATION: ORIENTATION: MID

## 2025-06-19 ASSESSMENT — PAIN DESCRIPTION - DESCRIPTORS: DESCRIPTORS: THROBBING

## 2025-06-19 ASSESSMENT — PAIN DESCRIPTION - LOCATION: LOCATION: HEAD

## 2025-06-19 NOTE — CONSULTS
[facility]'s  Consult    Patient's Name/Date of Birth: Apolinar GRIMES Posey / 1970 (55 y.o.)    Date: June 19, 2025     Referring Provider: Jef Chavez DO    CHIEF COMPLAINT: SOB      HPI: This is a pleasant 55 y.o. male hx of HTN HLD, DM II, CKD, active cocaine use, chronic alcohol use who present for management of sob, cough, wheezing.  No chest pain.  Some pleurisy.  No angina or arm pain.  No seizures.  Not taking meds.  SBP > 200s.  ECG showing NSR, LAE, LVH repolarization changes, Cr 1.6. Hgb 13.6.  CXR with mild hilar infiltrates, cardiomegaly, and fluid in the fissures suggestive of pulmonary edema.  He was on Norvasc, HCTZ, Lisinopril as outpatient.  Due to cocaine use, BB has been avoided. Trop 29-30. Started on NTG gtt infusion.  BP improved this AM.   No chest pain, angina, CAMARA, orthopnea, PND, sob at rest, palpitations, LE edema, or syncope.  SOB improved this AM.  Does not want to talk much this AM.      Echo:     Left Ventricle: Severely reduced left ventricular systolic function with a visually estimated EF of 25 - 30%. Left ventricle is severely dilated. Severely increased wall thickness. Severe global hypokinesis present.    Right Ventricle: Right ventricle size is normal. Moderately reduced systolic function.    Aortic Valve: Mild to moderate regurgitation.    Mitral Valve: Trace regurgitation.    Tricuspid Valve: Trace regurgitation.    Left Atrium: Left atrium is severely dilated.    Right Atrium: Right atrium is moderately dilated.    Aorta: Ascending Aorta is 3.3 cm.    IVC/SVC: IVC diameter is normal or and decreases greater than 50% during inspiration; therefore the estimated right atrial pressure is normal (~3 mmHg).    Image quality is adequate.     All labs, EKG's, diagnostic testing and images as well as cardiac cath, stress testing were reviewed during this encounter    Past Medical History:   Diagnosis Date    GERD (gastroesophageal reflux disease)      Stephie Michaud is a 67 y.o. female    Chief Complaint   Patient presents with    Follow-up     stage IV NSCLC- PDL1- 15%, no  mutations        1. Have you been to the ER, urgent care clinic since your last visit?  Hospitalized since your last visit? Yes, Urgent Care, [facility]    2. Have you seen or consulted any other health care providers outside of the [facility] since your last visit?  Include any pap smears or colon screening. No       nodularity consistent with pleural metastases in the  left hemithorax is again noted and overall the nodules have increased in size as  described above.  2. There is a 15 mm enlarged lymph node in the left retroperitoneum at the level  of the left renal artery that is increasing in the interval.  3. Stable changes consistent with carcinomatosis in the lower abdomen are noted.  No free fluid.    3/26/2025 CT abdomen/pelvis:  1. Slightly increased size of a few scattered prominent but sub-centimeter lymph nodes, indeterminate for early metastases. Multiple prominent mesenteric lymph nodes are also associated with mild mesenteric edema. Previously noted mildly prominent right inguinal lymph node is unchanged.     2. Unchanged (1.2 cm) (series 6, image 71) left adrenal nodule.     3. No suspicious bone lesions.     3/26/2025 CT chest with contrast:  1. Worsening metastatic disease with bilateral, but widespread left lung metastases.   2. Increased size of left supraclavicular and mediastinal metastatic lymphadenopathy.       CTA Chest 6/16/2025  IMPRESSION:  There is no pulmonary embolism.  There is no aortic aneurysm.  Imaging findings consistent with interval progression of metastatic disease. New  and/or enlarged pulmonary masses and nodules as described above. Pleural  thickening on the left with minimal left-sided pleural effusion also slightly  increased compared to previous study.    Assessment:   1) Non small cell Lung cancer- adenocarcinoma stage IV      PDL1-15%( UVA Path was 70%) , ALK/EGFR/ROS1/BRAF negative (limited panel)   Liquid bx [id number], TMB low   CT chest abdomen and pelvis from 5/5/2022 was reviewed.  Notable for large left-sided pleural effusion, concern for pleural carcinomatosis, left apical lung nodule measuring 20 x 26 mm, left adrenal  nodule 19 x 18 mm, peritoneal nodularity concerning for carcinomatosis.   Status post left-sided thoracentesis and + cytology    H/o Smoking   Palliative  ascultation no rales, wheezes, or rhonchi  Abdomen: positive bowel sounds, soft, non-tender, non-distended, no bruits, no masses  Extremities:no clubbing, cyanosis or edema  Neurologic: alert and oriented x 3, cranial nerves 2-12 grossly intact, motor and sensory intact, moving all extremities  Skin: No rashes  Psych: AO x 3, no depression/taya, no pressured speech, normal affect  Lymph: No obvious LAD      Assessment:  Hypertensive emergency  Cocaine use  Non-compliance  CKD  EF 25-30%  LVH      Plan:  Start PO meds again  Would optimally take GDMT but not able to comply/afford/cocaine/CKD use  Social work  Monitor for withdrawal  No work-up for cardiomyopathy due to active cocaine use and history of non-compliance   Needs to follow-up in office to discuss options and show he is willing to comply  Use Bumex IV to help with breathing  Again LifeVest would be option, but unlikely to comply and follow-up   Consider palliative care as he will likely be a recurrent admission  Needs addiction services  Remains FULL CODE but high risk of decompensation with continued drug use  Further recommendations based on results and clinical course      Thank you for allowing us to participate in the care of this patient.  Please do not hesitate to call us with questions.    Time spent reviewing notes, data, discussing with patient/family, and formulating plan with clinical documentation was approximately 80 minutes.     Electronically signed by Ronaldo Villatoro MD on 6/19/2025 at 7:38 AM    Interventional Cardiology - [facility]

## 2025-06-19 NOTE — PALLIATIVE CARE
Initial Evaluation        Patient:   Apolinar Posey  YOB: 1970  Age:  55 y.o.  Room:  [address] [place] [address]-  MRN:  [id number]   Acct: [id number]    Date of Admission:  6/18/2025  6:44 AM  Date of Service:  6/19/2025  Completed By:  Gurdeep Valiente RN        Reason for Palliative Care Evaluation:-   Goals of Care and code status     Current Concerns   Patient denies concerns at this time     Palliative Performance Scale   70%  Ambulation reduced; unable to perform normal job/work; significant  disease; able to do own self care; normal or reduced intake; fully conscious     History    Patient admitted 6/18 from home with hypertensive emergency, pulmonary edema. Other chronic illnesses include HF, CKD, HLD, DM, chronic alcohol abuse and polysubstance abuse.       Goals of Care Discussions and Plan         Advance Care Planning   Goals of Care/Advance Care Planning (ACP) Conversation    Date of Conversation: 06/19/25    Individuals present for the conversation: Patient with decision making capacity     ACP documents on file prior to discussion:  -None    Previously completed document/s not on file: Patient / participant reports that there are no previously executed ACP documents.    Healthcare Power of /Healthcare Surrogate Decision Makers:    Primary Decision Maker: Susan Buenrostro - Spouse - [phone]    Secondary Decision Maker: Ekta Posey - Parent - [phone]    Conversation Summary: Met with patient at bedside. Introduced palliative care and role on medical team, patient consented to further conversation. Patient offered minimal conversation throughout visit typically with one word answers to questions. Discussed code status. Education provided on cardiopulmonary resuscitation including potential risks and outcomes with chronic disease. Patient elected a FULL code at this time. Patient denies having any advanced directives. Education provided on [place] Hierarchy of Surrogate decision makers.

## 2025-06-19 NOTE — CARE COORDINATION
Case Management Assessment Initial Evaluation    Date/Time of Evaluation: 2025 7:54 AM  Assessment Completed by: Sadie Zuniga RN    If patient is discharged prior to next notation, then this note serves as note for discharge by case management.    Patient Name: Apolinar Posey                   YOB: 1970  Diagnosis: Hypertensive emergency [I16.1]  Congestive heart failure, unspecified HF chronicity, unspecified heart failure type (HCC) [I50.9]                   Date / Time: 2025  6:44 AM  Location: [address] [place] [address]     Patient Admission Status: Inpatient   Readmission Risk Low 0-14, Mod 15-19), High > 20: Readmission Risk Score: 12.7    Current PCP: Ruthy Vasquez APRN - CNP  Health Care Decision Makers:   Primary Decision Maker: Susan Buenrostro - Spouse - [phone]    Secondary Decision Maker: Ekta Posey - Parent - [phone]    Additional Case Management Notes: Admitted through ED with SOB and HTN. BP high of 234/156. This am 152/87. Troponins 31, 30, 29, 30. DONALD. NTG gtt. Phenobarb prn. Consulting Cardiology.     Procedures:    Echo: Left Ventricle: EF 25-30%. Left ventricle is severely dilated. Severely increased wall thickness. Severe global hypokinesis present. Right Ventricle:  Moderately reduced systolic function. Aortic Valve: Mild to moderate regurgitation. Left Atrium: Left atrium is severely dilated. Right Atrium: Right atrium is moderately dilated.     Imagin/18 CXR:   1. Cardiomegaly.  2. New fluid in the fissures and peripheral interstitial opacities suggesting  mild pulmonary edema.    Patient Goals/Plan/Treatment Preferences: Spoke with pt. He lives at home with his wife. Plans to return there at discharge. hospitals will need a ride home. hospitals has no money for a cab.      25 1318   Service Assessment   Patient Orientation Alert and Oriented   Cognition Alert   History Provided By Patient   Primary Caregiver Self   Accompanied By/Relationship n/a

## 2025-06-19 NOTE — PROGRESS NOTES
Hospitalist Progress Note    Patient:  Apolinar Posey      Unit/Bed:3A-07/007-A    YOB: 1970    MRN: [id number]       Acct: [id number]     PCP: Ruthy Vasquez APRN - CNP    Date of Admission: 6/18/2025    Assessment/Plan:    Hypertensive emergency/urgency: Presented SBP in the 170-190s.  Associated with pulmonary edema.  History of hypertension, noncompliant and concomitant cocaine use-last used 6/18.  Started on nitro gtt.  Continued home amlodipine 5 mg p.o. daily.  Held home losartan and hydrochlorothiazide in setting of DONALD, see below.   Wean nitro gtt. as tolerable for SBP <160.  Continue Bumex 1 mg IV twice daily.  Strict I&O's.  Echo ordered.  Monitor blood pressure.  Continue antihypertensives as appropriate.  Acute HFrEF, NYHA II: Likely NICM secondary to cocaine use.  Low suspicion for active ACS.  HST flat trend, EKG LVH.  Echo EF 25-30%, severe increased wall thickness, global hypokinesis, mild AS moderate AR 6/18/2025.  In exacerbation.  POA hypertensive urgency/emergency with pulmonary edema-see above.  2 g sodium and 2 L fluid restriction.  Strict I&O's.  Monitor daily weights.  Cardiology consulted, no work up for CM due to active cocaine use and noncompliance.  DONALD vs CKD?: Likely prerenal etiology in setting of hypertensive urgency/emergency with concomitant cocaine use-see above.  Noted chronically elevated.  Suspect longstanding hypertensive nephrosclerosis.  UA with trace protein.  UA protein to creatinine ratio ordered.  POA CR 1.6.  Limit nephrotoxic agent.  Monitor renal function with daily BMP.  HLD: Per chart review.  On statin.  NIDDMII: Last HgbA1c 7.8 2/2022.  Home activity regimen includes metformin and glipizide.  Started on medium dose insulin/scale with Accu-Cheks.  Monitor blood glucose with daily BMP.  Adjust insulin accordingly.  Hypoglycemic protocols initiated.  Repeat HbA1c  Chronic alcohol abuse: Endorses drinking 18-24 standard drinks per day.  Denies any

## 2025-06-19 NOTE — PROGRESS NOTES
Physician Progress Note      PATIENT:               KEEGAN WALLACE  CSN #:                  [id number]  :                       1970  ADMIT DATE:       2025 6:44 AM  DISCH DATE:  RESPONDING  PROVIDER #:        Jef Chavez DO          QUERY TEXT:    \"Acute HFrEF, NYHA II: Likely NICM secondary to cocaine use.  Low suspicion   for active ACS.\" documented in medical chart Progress Notes by Jef Chavez DO   at 2025  7:46 AM  Please document further specificity regarding the most   likely etiology of the CHF:    The clinical indicators include:  56 yo M admitted with Hypertensive emergency, SOB, and Acute HFrEF  medical history of Hyperlipidemia, HTN, and Polysubstance abuse. ALso noted   DONALD vs CKD?: Likely prerenal etiology in setting of hypertensive   urgency/emergency with concomitant cocaine use.  Noted chronically elevated.  Heart failure in acute exacerbation  Check TTE.  No echocardiogram on file.  Bumex 1 mg twice daily x 2 doses as above.  Strict ins and outs. Daily weights. Low sodium diet. Fluid restriction.  Options provided:  -- CHF related to Hypertensive Heart Disease with chronic kidney disease stage   1-4  -- Other - I will add my own diagnosis  -- Disagree - Not applicable / Not valid  -- Disagree - Clinically unable to determine / Unknown  -- Refer to Clinical Documentation Reviewer    PROVIDER RESPONSE TEXT:    This patient has CHF related to Hypertensive Heart Disease with chronic kidney   disease stage 1-4    Query created by: Sadie Romero on 2025 11:18 AM      Electronically signed by:  Jef Chavez DO 2025 3:11 PM

## 2025-06-19 NOTE — PLAN OF CARE
Problem: Chronic Conditions and Co-morbidities  Goal: Patient's chronic conditions and co-morbidity symptoms are monitored and maintained or improved  6/19/2025 1202 by Isidro Lopez RN  Outcome: Progressing  Flowsheets (Taken 6/19/2025 0800)  Care Plan - Patient's Chronic Conditions and Co-Morbidity Symptoms are Monitored and Maintained or Improved: Monitor and assess patient's chronic conditions and comorbid symptoms for stability, deterioration, or improvement  6/19/2025 0019 by Gail Redd, RN  Outcome: Progressing  Flowsheets (Taken 6/18/2025 2000)  Care Plan - Patient's Chronic Conditions and Co-Morbidity Symptoms are Monitored and Maintained or Improved:   Monitor and assess patient's chronic conditions and comorbid symptoms for stability, deterioration, or improvement   Collaborate with multidisciplinary team to address chronic and comorbid conditions and prevent exacerbation or deterioration   Update acute care plan with appropriate goals if chronic or comorbid symptoms are exacerbated and prevent overall improvement and discharge     Problem: Discharge Planning  Goal: Discharge to home or other facility with appropriate resources  6/19/2025 1202 by Isidro Lopez RN  Outcome: Progressing  Flowsheets (Taken 6/19/2025 0800)  Discharge to home or other facility with appropriate resources:   Identify barriers to discharge with patient and caregiver   Arrange for needed discharge resources and transportation as appropriate   Identify discharge learning needs (meds, wound care, etc)  6/19/2025 0019 by Gail Redd RN  Outcome: Progressing  Flowsheets (Taken 6/18/2025 2000)  Discharge to home or other facility with appropriate resources:   Identify barriers to discharge with patient and caregiver   Arrange for needed discharge resources and transportation as appropriate     Problem: Seizure Precautions  Goal: Remains free of injury related to seizures activity  6/19/2025 1202 by Jessica

## 2025-06-19 NOTE — PROCEDURES
PROCEDURE NOTE  Date: 6/19/2025   Name: Apolinar Posey  YOB: 1970    Procedures        EKG was completed and handed to RN

## 2025-06-19 NOTE — PLAN OF CARE
Problem: Respiratory - Adult  Goal: Achieves optimal ventilation and oxygenation  Outcome: Progressing     Problem: Respiratory - Adult  Goal: Clear lung sounds  Description: Clear lung sounds  Outcome: Progressing  Note: Pt had no questions on purpose or side effects of medication   Pt mutually agreed upon goals

## 2025-06-19 NOTE — PLAN OF CARE
Problem: Chronic Conditions and Co-morbidities  Goal: Patient's chronic conditions and co-morbidity symptoms are monitored and maintained or improved  6/19/2025 0019 by Gail Redd RN  Outcome: Progressing  Flowsheets (Taken 6/18/2025 2000)  Care Plan - Patient's Chronic Conditions and Co-Morbidity Symptoms are Monitored and Maintained or Improved:   Monitor and assess patient's chronic conditions and comorbid symptoms for stability, deterioration, or improvement   Collaborate with multidisciplinary team to address chronic and comorbid conditions and prevent exacerbation or deterioration   Update acute care plan with appropriate goals if chronic or comorbid symptoms are exacerbated and prevent overall improvement and discharge  6/18/2025 1409 by Topher Galaviz RN  Outcome: Progressing     Problem: Discharge Planning  Goal: Discharge to home or other facility with appropriate resources  6/19/2025 0019 by Gail Redd RN  Outcome: Progressing  Flowsheets (Taken 6/18/2025 2000)  Discharge to home or other facility with appropriate resources:   Identify barriers to discharge with patient and caregiver   Arrange for needed discharge resources and transportation as appropriate  6/18/2025 1409 by Topher Galaviz RN  Outcome: Progressing     Problem: Seizure Precautions  Goal: Remains free of injury related to seizures activity  6/19/2025 0019 by Gail Redd RN  Outcome: Progressing  6/18/2025 1409 by Topher Galaviz RN  Outcome: Progressing     Problem: Pain  Goal: Verbalizes/displays adequate comfort level or baseline comfort level  Outcome: Progressing     Problem: Safety - Adult  Goal: Free from fall injury  Outcome: Progressing  Flowsheets (Taken 6/18/2025 2048)  Free From Fall Injury: Instruct family/caregiver on patient safety

## 2025-06-20 LAB
ALBUMIN SERPL BCG-MCNC: 3.8 G/DL (ref 3.4–4.9)
ALP SERPL-CCNC: 168 U/L (ref 40–129)
ALT SERPL W/O P-5'-P-CCNC: 41 U/L (ref 10–50)
ANION GAP SERPL CALC-SCNC: 15 MEQ/L (ref 8–16)
AST SERPL-CCNC: 28 U/L (ref 10–50)
BILIRUB CONJ SERPL-MCNC: 0.3 MG/DL (ref 0–0.2)
BILIRUB SERPL-MCNC: 0.7 MG/DL (ref 0.3–1.2)
BUN SERPL-MCNC: 38 MG/DL (ref 8–23)
CALCIUM SERPL-MCNC: 9.6 MG/DL (ref 8.6–10)
CHLORIDE SERPL-SCNC: 98 MEQ/L (ref 98–111)
CO2 SERPL-SCNC: 22 MEQ/L (ref 22–29)
CREAT SERPL-MCNC: 2.1 MG/DL (ref 0.7–1.2)
GFR SERPL CREATININE-BSD FRML MDRD: 37 ML/MIN/1.73M2
GLUCOSE BLD STRIP.AUTO-MCNC: 113 MG/DL (ref 70–108)
GLUCOSE BLD STRIP.AUTO-MCNC: 144 MG/DL (ref 70–108)
GLUCOSE BLD STRIP.AUTO-MCNC: 149 MG/DL (ref 70–108)
GLUCOSE BLD STRIP.AUTO-MCNC: 231 MG/DL (ref 70–108)
GLUCOSE SERPL-MCNC: 132 MG/DL (ref 74–109)
NT-PROBNP SERPL IA-MCNC: 832 PG/ML (ref 0–124)
POTASSIUM SERPL-SCNC: 4.8 MEQ/L (ref 3.5–5.2)
PROT SERPL-MCNC: 6.6 G/DL (ref 6.4–8.3)
SODIUM SERPL-SCNC: 135 MEQ/L (ref 135–145)

## 2025-06-20 PROCEDURE — 82948 REAGENT STRIP/BLOOD GLUCOSE: CPT

## 2025-06-20 PROCEDURE — 6370000000 HC RX 637 (ALT 250 FOR IP): Performed by: PHYSICIAN ASSISTANT

## 2025-06-20 PROCEDURE — 6360000002 HC RX W HCPCS

## 2025-06-20 PROCEDURE — 2500000003 HC RX 250 WO HCPCS: Performed by: PHYSICIAN ASSISTANT

## 2025-06-20 PROCEDURE — 99233 SBSQ HOSP IP/OBS HIGH 50: CPT | Performed by: STUDENT IN AN ORGANIZED HEALTH CARE EDUCATION/TRAINING PROGRAM

## 2025-06-20 PROCEDURE — 82248 BILIRUBIN DIRECT: CPT

## 2025-06-20 PROCEDURE — 2580000003 HC RX 258

## 2025-06-20 PROCEDURE — 6370000000 HC RX 637 (ALT 250 FOR IP): Performed by: STUDENT IN AN ORGANIZED HEALTH CARE EDUCATION/TRAINING PROGRAM

## 2025-06-20 PROCEDURE — 94761 N-INVAS EAR/PLS OXIMETRY MLT: CPT

## 2025-06-20 PROCEDURE — 80053 COMPREHEN METABOLIC PANEL: CPT

## 2025-06-20 PROCEDURE — 2060000000 HC ICU INTERMEDIATE R&B

## 2025-06-20 PROCEDURE — 94640 AIRWAY INHALATION TREATMENT: CPT

## 2025-06-20 PROCEDURE — 36415 COLL VENOUS BLD VENIPUNCTURE: CPT

## 2025-06-20 PROCEDURE — 99232 SBSQ HOSP IP/OBS MODERATE 35: CPT | Performed by: STUDENT IN AN ORGANIZED HEALTH CARE EDUCATION/TRAINING PROGRAM

## 2025-06-20 PROCEDURE — 83880 ASSAY OF NATRIURETIC PEPTIDE: CPT

## 2025-06-20 PROCEDURE — 6360000002 HC RX W HCPCS: Performed by: STUDENT IN AN ORGANIZED HEALTH CARE EDUCATION/TRAINING PROGRAM

## 2025-06-20 PROCEDURE — 6360000002 HC RX W HCPCS: Performed by: PHYSICIAN ASSISTANT

## 2025-06-20 RX ORDER — HYDRALAZINE HYDROCHLORIDE 50 MG/1
50 TABLET, FILM COATED ORAL EVERY 8 HOURS SCHEDULED
Status: DISCONTINUED | OUTPATIENT
Start: 2025-06-20 | End: 2025-06-22 | Stop reason: HOSPADM

## 2025-06-20 RX ORDER — ISOSORBIDE MONONITRATE 30 MG/1
30 TABLET, EXTENDED RELEASE ORAL DAILY
Status: DISCONTINUED | OUTPATIENT
Start: 2025-06-20 | End: 2025-06-22 | Stop reason: HOSPADM

## 2025-06-20 RX ADMIN — ATORVASTATIN CALCIUM 40 MG: 40 TABLET, FILM COATED ORAL at 08:34

## 2025-06-20 RX ADMIN — ISOSORBIDE MONONITRATE 30 MG: 30 TABLET, EXTENDED RELEASE ORAL at 13:13

## 2025-06-20 RX ADMIN — Medication 100 MG: at 08:34

## 2025-06-20 RX ADMIN — INSULIN LISPRO 2 UNITS: 100 INJECTION, SOLUTION INTRAVENOUS; SUBCUTANEOUS at 17:03

## 2025-06-20 RX ADMIN — AMLODIPINE BESYLATE 10 MG: 10 TABLET ORAL at 08:34

## 2025-06-20 RX ADMIN — SODIUM CHLORIDE, PRESERVATIVE FREE 10 ML: 5 INJECTION INTRAVENOUS at 08:39

## 2025-06-20 RX ADMIN — TIOTROPIUM BROMIDE AND OLODATEROL 2 PUFF: 3.124; 2.736 SPRAY, METERED RESPIRATORY (INHALATION) at 10:06

## 2025-06-20 RX ADMIN — HYDRALAZINE HYDROCHLORIDE 50 MG: 50 TABLET ORAL at 13:13

## 2025-06-20 RX ADMIN — NITROGLYCERIN 50 MCG/MIN: 5 INJECTION, SOLUTION INTRAVENOUS at 15:54

## 2025-06-20 RX ADMIN — HYDRALAZINE HYDROCHLORIDE 25 MG: 25 TABLET ORAL at 06:03

## 2025-06-20 RX ADMIN — FOLIC ACID 1 MG: 1 TABLET ORAL at 08:34

## 2025-06-20 RX ADMIN — BUMETANIDE 1 MG: 0.25 INJECTION INTRAMUSCULAR; INTRAVENOUS at 08:35

## 2025-06-20 RX ADMIN — SODIUM CHLORIDE, PRESERVATIVE FREE 10 ML: 5 INJECTION INTRAVENOUS at 19:41

## 2025-06-20 RX ADMIN — ENOXAPARIN SODIUM 40 MG: 100 INJECTION SUBCUTANEOUS at 19:41

## 2025-06-20 RX ADMIN — NITROGLYCERIN 80 MCG/MIN: 5 INJECTION, SOLUTION INTRAVENOUS at 00:40

## 2025-06-20 RX ADMIN — Medication 1 TABLET: at 08:34

## 2025-06-20 RX ADMIN — HYDRALAZINE HYDROCHLORIDE 50 MG: 50 TABLET ORAL at 21:34

## 2025-06-20 ASSESSMENT — PAIN SCALES - GENERAL
PAINLEVEL_OUTOF10: 0

## 2025-06-20 NOTE — PROGRESS NOTES
Utilize [facility] alcohol withdrawal scale (Based on Manuel Modified Alcohol Withdrawal Scale).  Tabulate score based on classifications including Tremor, Sweating, Hallucination, Orientation, and Agitation.    Tremor: 1  Sweatin  Hallucinations: 0  Orientation: 0  Agitation: 1  Total Score: 2  Action perform as described below     Tremor:  No tremor is 0 points.  Tremor on movement is 1 point.  Tremor at rest is 2 points.  Sweating: No Sweat 0 points. Moist is 1 point.  Drenching sweats is 2 points.  Hallucinations: No present 0 points. Dissuadable is 1 point. Not dissuadable is 2 points.  Orientation: Oriented 0 points. Vague/detached 1 point. Disoriented/no contact 2 points.  Agitation: Calm 0 points.  Anxious 1 point. Panicky 2 points.    Check scale every 2 hours.  Discontinue scoring with 4 consecutive scorings of 0.

## 2025-06-20 NOTE — PROGRESS NOTES
Utilize [facility] alcohol withdrawal scale (Based on [place] Modified Alcohol Withdrawal Scale).  Tabulate score based on classifications including Tremor, Sweating, Hallucination, Orientation, and Agitation.    Tremor: 0  Sweatin  Hallucinations: 0  Orientation: 0  Agitation: 0  Total Score: 0  Action perform as described below     Tremor:  No tremor is 0 points.  Tremor on movement is 1 point.  Tremor at rest is 2 points.  Sweating: No Sweat 0 points. Moist is 1 point.  Drenching sweats is 2 points.  Hallucinations: No present 0 points. Dissuadable is 1 point. Not dissuadable is 2 points.  Orientation: Oriented 0 points. Vague/detached 1 point. Disoriented/no contact 2 points.  Agitation: Calm 0 points.  Anxious 1 point. Panicky 2 points.    Check scale every 2 hours.  Discontinue scoring with 4 consecutive scorings of 0.

## 2025-06-20 NOTE — PROGRESS NOTES
Cardiology Progress Note      Patient:  Apolinar Posey  YOB: 1970  MRN: [id number]   Acct: [id number]  Admit Date:  6/18/2025  Primary Cardiologist: none  Note per Dr Villatoro:  \"CHIEF COMPLAINT: SOB        HPI: This is a pleasant 55 y.o. male hx of HTN HLD, DM II, CKD, active cocaine use, chronic alcohol use who present for management of sob, cough, wheezing.  No chest pain.  Some pleurisy.  No angina or arm pain.  No seizures.  Not taking meds.  SBP > 200s.  ECG showing NSR, LAE, LVH repolarization changes, Cr 1.6. Hgb 13.6.  CXR with mild hilar infiltrates, cardiomegaly, and fluid in the fissures suggestive of pulmonary edema.  He was on Norvasc, HCTZ, Lisinopril as outpatient.  Due to cocaine use, BB has been avoided. Trop 29-30. Started on NTG gtt infusion.  BP improved this AM.   No chest pain, angina, CAMARA, orthopnea, PND, sob at rest, palpitations, LE edema, or syncope.  SOB improved this AM.  Does not want to talk much this AM.\"    Subjective (Events in last 24 hours):   Pt awake, alert. NAD. Denies any cp or sob. States he fels better. Discussed continuing to treat bp and wean nitro prior to discharge. Attending will discuss LV with him. Needs to show compliance prior to any further workup or treatment.       -2L net based on I/O charting  Objective:   BP (!) 159/112   Pulse 96   Temp 98.1 °F (36.7 °C) (Oral)   Resp 26   Ht 1.702 m (5' 7\")   Wt 68.4 kg (150 lb 12.7 oz)   SpO2 92%   BMI 23.62 kg/m²      vss  TELEMETRY: nsr    Physical Exam:  General Appearance: alert and oriented to person, place and time, in no acute distress  Cardiovascular: normal rate, regular rhythm, normal S1 and S2, no murmurs, rubs, clicks, or gallops, distal pulses intact, no carotid bruits, no JVD  Pulmonary/Chest: clear to auscultation bilaterally- no wheezes, rales or rhonchi, normal air movement, no respiratory distress  Abdomen: soft, non-tender, non-distended, normal bowel sounds, no masses   Extremities:

## 2025-06-20 NOTE — CARE COORDINATION
6/20/25, 1:48 PM EDT    DISCHARGE ON GOING EVALUATION    Apolinar Posey       [facility] day: 2  Location: -10/010-A Reason for admit: Hypertensive emergency [I16.1]  Congestive heart failure, unspecified HF chronicity, unspecified heart failure type (HCC) [I50.9]     Procedures:   6/18 Echo: Left Ventricle: EF 25-30%. Left ventricle is severely dilated. Severely increased wall thickness. Severe global hypokinesis present. Right Ventricle:  Moderately reduced systolic function. Aortic Valve: Mild to moderate regurgitation. Left Atrium: Left atrium is severely dilated. Right Atrium: Right atrium is moderately dilated.     Imaging since last note: None    Barriers to Discharge: Hospitalist and Cardiology following. BP remains elevated, high of 180/103, now 123/82. NTG gtt continues. Adjusting meds.     PCP: Ruthy Vasquez APRN - CNP  Readmission Risk Score: 13.1    Patient Goals/Plan/Treatment Preferences: From home w/wife. Pt is now agreeable to Life Vest. Per Cardiology pt needs to comply with taking meds and stop using cocaine.

## 2025-06-20 NOTE — PROGRESS NOTES
Hospitalist Progress Note    Patient:  Apolinar Posey      Unit/Bed:3A-07/007-A    YOB: 1970    MRN: [id number]       Acct: [id number]     PCP: Ruthy Vasquez APRN - CNP    Date of Admission: 6/18/2025    Assessment/Plan:    Hypertensive emergency/urgency: Presented SBP in the 170-190s.  Associated with pulmonary edema.  History of hypertension, noncompliant and concomitant cocaine use-last used 6/18.  Started on nitro gtt.  Continued home amlodipine 10 mg p.o. daily.  Held home losartan and hydrochlorothiazide in setting of DONALD, see below.  Wean nitro gtt. as tolerable for SBP <160. Strict I&O's.  Echo reduced EF.  Monitor blood pressure.  Continue antihypertensives as appropriate.  6/20 s/p Bumex 1 mg IV twice daily.  Renal function worsening.  Held diuretics.  Started Imdur 30 mg p.o. daily and increase hydralazine to 50 mg p.o. 3 times daily to help wean off nitro gtt.  Acute HFrEF, NYHA II: Likely NICM secondary to cocaine use.  Low suspicion for active ACS.  HST flat trend, EKG LVH.  Echo EF 25-30%, severe increased wall thickness, global hypokinesis, mild AS moderate AR 6/18/2025.  In exacerbation.  POA hypertensive urgency/emergency with pulmonary edema-see above.  2 g sodium and 2 L fluid restriction.  Strict I&O's.  Monitor daily weights.  Cardiology consulted, no work up for CM due to active cocaine use and noncompliance.  6/20 agreeable to LifeVest  DONALD vs CKD, worsening: Likely prerenal etiology in setting of hypertensive urgency/emergency with concomitant cocaine use-see above.  Noted chronically elevated.  Suspect longstanding hypertensive nephrosclerosis.  UA with trace protein.  UA protein to creatinine ratio ordered.  POA CR 1.6.  Limit nephrotoxic agent.  Monitor renal function with daily BMP.  HLD: Per chart review.  On statin.  NIDDMII: Last HgbA1c 7.8 2/2022.  Home activity regimen includes metformin and glipizide.  Started on medium dose insulin/scale with Accu-Cheks.

## 2025-06-20 NOTE — PLAN OF CARE
Problem: Chronic Conditions and Co-morbidities  Goal: Patient's chronic conditions and co-morbidity symptoms are monitored and maintained or improved  6/19/2025 2000 by Lissett Andrea RN  Outcome: Progressing  6/19/2025 1202 by Isidro Lopez RN  Outcome: Progressing  Flowsheets (Taken 6/19/2025 0800)  Care Plan - Patient's Chronic Conditions and Co-Morbidity Symptoms are Monitored and Maintained or Improved: Monitor and assess patient's chronic conditions and comorbid symptoms for stability, deterioration, or improvement     Problem: Discharge Planning  Goal: Discharge to home or other facility with appropriate resources  6/19/2025 1202 by Isidro Lopez RN  Outcome: Progressing  Flowsheets (Taken 6/19/2025 0800)  Discharge to home or other facility with appropriate resources:   Identify barriers to discharge with patient and caregiver   Arrange for needed discharge resources and transportation as appropriate   Identify discharge learning needs (meds, wound care, etc)     Problem: Seizure Precautions  Goal: Remains free of injury related to seizures activity  6/19/2025 2000 by Lissett Andrea RN  Outcome: Progressing  6/19/2025 1202 by Isidro Lopez RN  Outcome: Progressing     Problem: Pain  Goal: Verbalizes/displays adequate comfort level or baseline comfort level  6/19/2025 1202 by Isidro Lopez RN  Outcome: Progressing     Problem: Safety - Adult  Goal: Free from fall injury  6/19/2025 2000 by Lissett Andrea RN  Outcome: Progressing  6/19/2025 1202 by Isidro Lopez RN  Outcome: Progressing     Problem: Respiratory - Adult  Goal: Achieves optimal ventilation and oxygenation  6/19/2025 2000 by Lissett Andrea RN  Outcome: Progressing  6/19/2025 1202 by Isidro Lopez RN  Outcome: Progressing  6/19/2025 0749 by Lianne Peguero RCP  Outcome: Progressing  Goal: Clear lung sounds  Description: Clear lung sounds  6/19/2025 0749 by Lianne Peguero RCP  Outcome: Progressing  Note:

## 2025-06-20 NOTE — PLAN OF CARE
Problem: Chronic Conditions and Co-morbidities  Goal: Patient's chronic conditions and co-morbidity symptoms are monitored and maintained or improved  6/20/2025 0937 by Isidro Lopez RN  Outcome: Progressing  Flowsheets (Taken 6/20/2025 0800)  Care Plan - Patient's Chronic Conditions and Co-Morbidity Symptoms are Monitored and Maintained or Improved: Monitor and assess patient's chronic conditions and comorbid symptoms for stability, deterioration, or improvement  6/19/2025 2000 by Lissett Andrea RN  Outcome: Progressing     Problem: Discharge Planning  Goal: Discharge to home or other facility with appropriate resources  Outcome: Progressing  Flowsheets (Taken 6/20/2025 0800)  Discharge to home or other facility with appropriate resources:   Identify barriers to discharge with patient and caregiver   Arrange for needed discharge resources and transportation as appropriate   Identify discharge learning needs (meds, wound care, etc)     Problem: Seizure Precautions  Goal: Remains free of injury related to seizures activity  6/20/2025 0937 by Isidro Lopez RN  Outcome: Progressing  6/19/2025 2000 by Lissett Andrea RN  Outcome: Progressing     Problem: Pain  Goal: Verbalizes/displays adequate comfort level or baseline comfort level  Outcome: Progressing     Problem: Safety - Adult  Goal: Free from fall injury  6/20/2025 0937 by Isidro Lopez RN  Outcome: Progressing  6/19/2025 2000 by Lissett Andrea RN  Outcome: Progressing     Problem: Respiratory - Adult  Goal: Achieves optimal ventilation and oxygenation  6/20/2025 0937 by Isidro Lopez RN  Outcome: Progressing  6/19/2025 2000 by Lissett Andrea RN  Outcome: Progressing

## 2025-06-21 PROBLEM — I42.9 CARDIOMYOPATHY (HCC): Status: ACTIVE | Noted: 2025-06-21

## 2025-06-21 LAB
ALBUMIN SERPL BCG-MCNC: 3.5 G/DL (ref 3.4–4.9)
ALP SERPL-CCNC: 156 U/L (ref 40–129)
ALT SERPL W/O P-5'-P-CCNC: 29 U/L (ref 10–50)
ANION GAP SERPL CALC-SCNC: 14 MEQ/L (ref 8–16)
AST SERPL-CCNC: 23 U/L (ref 10–50)
BILIRUB CONJ SERPL-MCNC: 0.2 MG/DL (ref 0–0.2)
BILIRUB SERPL-MCNC: 0.5 MG/DL (ref 0.3–1.2)
BUN SERPL-MCNC: 41 MG/DL (ref 8–23)
CALCIUM SERPL-MCNC: 9.2 MG/DL (ref 8.6–10)
CHLORIDE SERPL-SCNC: 101 MEQ/L (ref 98–111)
CO2 SERPL-SCNC: 23 MEQ/L (ref 22–29)
CREAT SERPL-MCNC: 1.8 MG/DL (ref 0.7–1.2)
GFR SERPL CREATININE-BSD FRML MDRD: 44 ML/MIN/1.73M2
GLUCOSE BLD STRIP.AUTO-MCNC: 160 MG/DL (ref 70–108)
GLUCOSE BLD STRIP.AUTO-MCNC: 166 MG/DL (ref 70–108)
GLUCOSE BLD STRIP.AUTO-MCNC: 230 MG/DL (ref 70–108)
GLUCOSE BLD STRIP.AUTO-MCNC: 83 MG/DL (ref 70–108)
GLUCOSE SERPL-MCNC: 155 MG/DL (ref 74–109)
POTASSIUM SERPL-SCNC: 4.3 MEQ/L (ref 3.5–5.2)
PROT SERPL-MCNC: 6.2 G/DL (ref 6.4–8.3)
SODIUM SERPL-SCNC: 138 MEQ/L (ref 135–145)

## 2025-06-21 PROCEDURE — 82248 BILIRUBIN DIRECT: CPT

## 2025-06-21 PROCEDURE — 6370000000 HC RX 637 (ALT 250 FOR IP): Performed by: STUDENT IN AN ORGANIZED HEALTH CARE EDUCATION/TRAINING PROGRAM

## 2025-06-21 PROCEDURE — 94640 AIRWAY INHALATION TREATMENT: CPT

## 2025-06-21 PROCEDURE — 6370000000 HC RX 637 (ALT 250 FOR IP): Performed by: PHYSICIAN ASSISTANT

## 2025-06-21 PROCEDURE — 36415 COLL VENOUS BLD VENIPUNCTURE: CPT

## 2025-06-21 PROCEDURE — 6360000002 HC RX W HCPCS: Performed by: STUDENT IN AN ORGANIZED HEALTH CARE EDUCATION/TRAINING PROGRAM

## 2025-06-21 PROCEDURE — 99232 SBSQ HOSP IP/OBS MODERATE 35: CPT | Performed by: STUDENT IN AN ORGANIZED HEALTH CARE EDUCATION/TRAINING PROGRAM

## 2025-06-21 PROCEDURE — 99233 SBSQ HOSP IP/OBS HIGH 50: CPT | Performed by: INTERNAL MEDICINE

## 2025-06-21 PROCEDURE — 6360000002 HC RX W HCPCS: Performed by: PHYSICIAN ASSISTANT

## 2025-06-21 PROCEDURE — 2500000003 HC RX 250 WO HCPCS: Performed by: PHYSICIAN ASSISTANT

## 2025-06-21 PROCEDURE — 2060000000 HC ICU INTERMEDIATE R&B

## 2025-06-21 PROCEDURE — 80053 COMPREHEN METABOLIC PANEL: CPT

## 2025-06-21 PROCEDURE — 82948 REAGENT STRIP/BLOOD GLUCOSE: CPT

## 2025-06-21 RX ADMIN — FOLIC ACID 1 MG: 1 TABLET ORAL at 08:33

## 2025-06-21 RX ADMIN — ATORVASTATIN CALCIUM 40 MG: 40 TABLET, FILM COATED ORAL at 08:33

## 2025-06-21 RX ADMIN — HYDRALAZINE HYDROCHLORIDE 50 MG: 50 TABLET ORAL at 14:08

## 2025-06-21 RX ADMIN — SODIUM CHLORIDE, PRESERVATIVE FREE 10 ML: 5 INJECTION INTRAVENOUS at 21:31

## 2025-06-21 RX ADMIN — ENOXAPARIN SODIUM 40 MG: 100 INJECTION SUBCUTANEOUS at 21:30

## 2025-06-21 RX ADMIN — SODIUM CHLORIDE, PRESERVATIVE FREE 10 ML: 5 INJECTION INTRAVENOUS at 21:24

## 2025-06-21 RX ADMIN — INSULIN LISPRO 2 UNITS: 100 INJECTION, SOLUTION INTRAVENOUS; SUBCUTANEOUS at 08:29

## 2025-06-21 RX ADMIN — Medication 1 TABLET: at 08:33

## 2025-06-21 RX ADMIN — HYDRALAZINE HYDROCHLORIDE 50 MG: 50 TABLET ORAL at 21:23

## 2025-06-21 RX ADMIN — HYDRALAZINE HYDROCHLORIDE 50 MG: 50 TABLET ORAL at 05:22

## 2025-06-21 RX ADMIN — TIOTROPIUM BROMIDE AND OLODATEROL 2 PUFF: 3.124; 2.736 SPRAY, METERED RESPIRATORY (INHALATION) at 08:53

## 2025-06-21 RX ADMIN — AMLODIPINE BESYLATE 10 MG: 10 TABLET ORAL at 08:33

## 2025-06-21 RX ADMIN — Medication 100 MG: at 08:33

## 2025-06-21 RX ADMIN — SODIUM CHLORIDE, PRESERVATIVE FREE 10 ML: 5 INJECTION INTRAVENOUS at 08:34

## 2025-06-21 RX ADMIN — BUMETANIDE 1 MG: 0.25 INJECTION INTRAMUSCULAR; INTRAVENOUS at 08:30

## 2025-06-21 RX ADMIN — ISOSORBIDE MONONITRATE 30 MG: 30 TABLET, EXTENDED RELEASE ORAL at 08:33

## 2025-06-21 ASSESSMENT — PAIN SCALES - GENERAL
PAINLEVEL_OUTOF10: 0
PAINLEVEL_OUTOF10: 0

## 2025-06-21 NOTE — PLAN OF CARE
Problem: Chronic Conditions and Co-morbidities  Goal: Patient's chronic conditions and co-morbidity symptoms are monitored and maintained or improved  6/20/2025 2058 by Eileen Carrington RN  Outcome: Progressing  Care Plan - Patient's Chronic Conditions and Co-Morbidity Symptoms are Monitored and Maintained or Improved: Monitor and assess patient's chronic conditions and comorbid symptoms for stability, deterioration, or improvement     Problem: Discharge Planning  Goal: Discharge to home or other facility with appropriate resources  6/20/2025 2058 by Eileen Carrington RN  Outcome: Progressing  Discharge to home or other facility with appropriate resources:   Identify barriers to discharge with patient and caregiver   Arrange for needed discharge resources and transportation as appropriate   Identify discharge learning needs (meds, wound care, etc)     Problem: Seizure Precautions  Goal: Remains free of injury related to seizures activity  6/20/2025 2058 by Eileen Carrington RN  Outcome: Progressing  Flowsheets (Taken 6/20/2025 2058)  Remains free of injury related to seizure activity:   Maintain airway, patient safety  and administer oxygen as ordered   Monitor patient for seizure activity, document and report duration and description of seizure to Licensed Independent Practitioner     Problem: Pain  Goal: Verbalizes/displays adequate comfort level or baseline comfort level  6/20/2025 2058 by Eileen Carrington RN  Outcome: Progressing  Flowsheets (Taken 6/20/2025 2058)  Verbalizes/displays adequate comfort level or baseline comfort level: Assess pain using appropriate pain scale     Problem: Safety - Adult  Goal: Free from fall injury  6/20/2025 2058 by Eileen Carrington RN  Outcome: Progressing  Free From Fall Injury: Instruct family/caregiver on patient safety   Care plan reviewed with patient.  Patient verbalizes understanding of the plan of care and contributed to goal setting.

## 2025-06-21 NOTE — PROGRESS NOTES
Hospitalist Progress Note      Patient:  Apolinar Posey 55 y.o. male     Unit/Bed:-10/010-A    Date of Admission: 6/18/2025      ASSESSMENT AND PLAN    Hypertensive emergency: Presented SBP in the 170-190s.  Associated with pulmonary edema.  History of hypertension, noncompliant and concomitant cocaine use-last used 6/18.  New Echo reduced EF.  S/p nitro gtt.    Holding home losartan and hydrochlorothiazide in setting of DONALD, see below.   Continued home amlodipine 10 mg p.o. daily.    Started Imdur 30 mg p.o. daily and increase hydralazine to 50 mg p.o. 3 times daily  Hold off BB due to cocaine use.   Acute HFrEF, NYHA II: Likely NICM secondary to cocaine use.  Low suspicion for active ACS.  HST flat trend, EKG LVH.  Echo EF 25-30%, severe increased wall thickness, global hypokinesis, mild AS moderate AR 6/18/2025.      2 g sodium and 2 L fluid restriction.    Strict I&O's.  Monitor daily weights.    Cardiology consulted, no work up for CM due to active cocaine use and noncompliance.    6/20 agreeable to LifeVest  Euvolemic on exam 6/21  DONALD on CKD IIIa, improving (baseline 1.3-1.6 since 2022): Likely r/t HTN emergency +/- CHF (cardiorenal syndrome), with concomitant cocaine use-see above.  Noted chronically elevated.  Suspect longstanding hypertensive nephrosclerosis.  UA with trace protein.  UPC 0.52.    Limit nephrotoxic agent.    Monitor renal function with daily BMP.  HLD: Per chart review.  On statin.  NIDDMII: Last HgbA1c 7.8 2/2022.  Home activity regimen includes metformin and glipizide.  Started on medium dose insulin/scale with Accu-Cheks.  Monitor blood glucose with daily BMP.  Adjust insulin accordingly.  Hypoglycemic protocols initiated.  Repeat HbA1c 6.1%  Chronic alcohol abuse: Endorses drinking 18-24 standard drinks per day.  Denies any prior history of alcohol withdrawal symptoms.    Continue multivitamin, folic acid and thiamine.    Monitor for signs symptoms of withdrawal.  Phenobarbital as

## 2025-06-21 NOTE — PROGRESS NOTES
Cardiology Progress Note      Patient:  Apolinar Posey  YOB: 1970  MRN: [id number]   Acct: [id number]  Admit Date:  6/18/2025  Primary Cardiologist: none  Note per Dr Villatoro:  \"CHIEF COMPLAINT: SOB        HPI: This is a pleasant 55 y.o. male hx of HTN HLD, DM II, CKD, active cocaine use, chronic alcohol use who present for management of sob, cough, wheezing.  No chest pain.  Some pleurisy.  No angina or arm pain.  No seizures.  Not taking meds.  SBP > 200s.  ECG showing NSR, LAE, LVH repolarization changes, Cr 1.6. Hgb 13.6.  CXR with mild hilar infiltrates, cardiomegaly, and fluid in the fissures suggestive of pulmonary edema.  He was on Norvasc, HCTZ, Lisinopril as outpatient.  Due to cocaine use, BB has been avoided. Trop 29-30. Started on NTG gtt infusion.  BP improved this AM.   No chest pain, angina, CAMARA, orthopnea, PND, sob at rest, palpitations, LE edema, or syncope.  SOB improved this AM.  Does not want to talk much this AM.\"    Subjective (Events in last 24 hours):   Pt awake, alert. NAD. Feeling mbetter and wants to go home. Discussed lifevest could be fitted at home. Needs to comply with recs and follow up.     Nitro weaned off overnight. BP is stable/reasonable at present.       -2L net based on I/O charting  Objective:   BP (!) 143/93   Pulse (!) 108   Temp 98.1 °F (36.7 °C) (Oral)   Resp 16   Ht 1.702 m (5' 7\")   Wt 65.8 kg (145 lb 1 oz)   SpO2 100%   BMI 22.72 kg/m²      vss  TELEMETRY: nsr    Physical Exam:  General Appearance: alert and oriented to person, place and time, in no acute distress  Cardiovascular: normal rate, regular rhythm, normal S1 and S2, no murmurs, rubs, clicks, or gallops, distal pulses intact, no carotid bruits, no JVD  Pulmonary/Chest: clear to auscultation bilaterally- no wheezes, rales or rhonchi, normal air movement, no respiratory distress  Abdomen: soft, non-tender, non-distended, normal bowel sounds, no masses   Extremities: no cyanosis, clubbing or  visually estimated EF of 25 - 30%. Left ventricle is severely dilated. Severely increased wall thickness. Severe global hypokinesis present.    Right Ventricle: Right ventricle size is normal. Moderately reduced systolic function.    Aortic Valve: Mild to moderate regurgitation.    Mitral Valve: Trace regurgitation.    Tricuspid Valve: Trace regurgitation.    Left Atrium: Left atrium is severely dilated.    Right Atrium: Right atrium is moderately dilated.    Aorta: Ascending Aorta is 3.3 cm.    IVC/SVC: IVC diameter is normal or and decreases greater than 50% during inspiration; therefore the estimated right atrial pressure is normal (~3 mmHg).    Image quality is adequate.        Comparison Study Information    Prior Study    There is no prior study available for comparison     Echo Findings    Left Ventricle Severely reduced left ventricular systolic function with a visually estimated EF of 25 - 30%. Left ventricle is severely dilated. Severely increased wall thickness. Severe global hypokinesis present. Indeterminate diastolic function.   Left Atrium Left atrium is severely dilated.   Right Ventricle Right ventricle size is normal. Moderately reduced systolic function.   Right Atrium Right atrium is moderately dilated.   Aortic Valve Trileaflet valve. Mild to moderate regurgitation. No stenosis.   Mitral Valve Valve structure is normal. Trace regurgitation. No stenosis noted.   Tricuspid Valve Valve structure is normal. Trace regurgitation. No stenosis noted.   Pulmonic Valve The pulmonic valve visualization is suboptimal but appears to be functioning normally. Trace regurgitation. No stenosis noted.   Aorta Ascending Aorta is 3.3 cm.   IVC/Hepatic Veins IVC diameter is normal or and decreases greater than 50% during inspiration; therefore the estimated right atrial pressure is normal (~3 mmHg).   Pericardium The pericardium is normal. No pericardial effusion.     Stress:     Left Heart Cath:     Lab

## 2025-06-21 NOTE — CARE COORDINATION
CM Note  Messaged George Spears Liaison for new LV; await George hopkins will call RN w LV Fitting ETA; collaborated bal Ty RN [phone]  Electronically signed by Scott Villagomez RN on 6/21/2025 at 12:08 PM

## 2025-06-22 VITALS
HEART RATE: 89 BPM | DIASTOLIC BLOOD PRESSURE: 83 MMHG | SYSTOLIC BLOOD PRESSURE: 135 MMHG | RESPIRATION RATE: 18 BRPM | OXYGEN SATURATION: 98 % | HEIGHT: 67 IN | BODY MASS INDEX: 21.7 KG/M2 | TEMPERATURE: 98.2 F | WEIGHT: 138.23 LBS

## 2025-06-22 LAB
ANION GAP SERPL CALC-SCNC: 12 MEQ/L (ref 8–16)
BUN SERPL-MCNC: 40 MG/DL (ref 8–23)
CALCIUM SERPL-MCNC: 8.7 MG/DL (ref 8.6–10)
CHLORIDE SERPL-SCNC: 102 MEQ/L (ref 98–111)
CO2 SERPL-SCNC: 20 MEQ/L (ref 22–29)
CREAT SERPL-MCNC: 1.7 MG/DL (ref 0.7–1.2)
GFR SERPL CREATININE-BSD FRML MDRD: 47 ML/MIN/1.73M2
GLUCOSE BLD STRIP.AUTO-MCNC: 132 MG/DL (ref 70–108)
GLUCOSE BLD STRIP.AUTO-MCNC: 177 MG/DL (ref 70–108)
GLUCOSE SERPL-MCNC: 110 MG/DL (ref 74–109)
POTASSIUM SERPL-SCNC: 4.2 MEQ/L (ref 3.5–5.2)
SODIUM SERPL-SCNC: 134 MEQ/L (ref 135–145)

## 2025-06-22 PROCEDURE — 99239 HOSP IP/OBS DSCHRG MGMT >30: CPT | Performed by: INTERNAL MEDICINE

## 2025-06-22 PROCEDURE — 94640 AIRWAY INHALATION TREATMENT: CPT

## 2025-06-22 PROCEDURE — 80048 BASIC METABOLIC PNL TOTAL CA: CPT

## 2025-06-22 PROCEDURE — 82948 REAGENT STRIP/BLOOD GLUCOSE: CPT

## 2025-06-22 PROCEDURE — 6370000000 HC RX 637 (ALT 250 FOR IP): Performed by: STUDENT IN AN ORGANIZED HEALTH CARE EDUCATION/TRAINING PROGRAM

## 2025-06-22 PROCEDURE — 2500000003 HC RX 250 WO HCPCS: Performed by: PHYSICIAN ASSISTANT

## 2025-06-22 PROCEDURE — 36415 COLL VENOUS BLD VENIPUNCTURE: CPT

## 2025-06-22 PROCEDURE — 6370000000 HC RX 637 (ALT 250 FOR IP): Performed by: PHYSICIAN ASSISTANT

## 2025-06-22 RX ORDER — HYDRALAZINE HYDROCHLORIDE 50 MG/1
50 TABLET, FILM COATED ORAL EVERY 8 HOURS SCHEDULED
Qty: 90 TABLET | Refills: 3 | Status: SHIPPED | OUTPATIENT
Start: 2025-06-22

## 2025-06-22 RX ORDER — AMLODIPINE BESYLATE 10 MG/1
10 TABLET ORAL DAILY
Qty: 30 TABLET | Refills: 3 | Status: SHIPPED | OUTPATIENT
Start: 2025-06-22

## 2025-06-22 RX ORDER — ISOSORBIDE MONONITRATE 30 MG/1
30 TABLET, EXTENDED RELEASE ORAL DAILY
Qty: 30 TABLET | Refills: 3 | Status: SHIPPED | OUTPATIENT
Start: 2025-06-23

## 2025-06-22 RX ADMIN — ATORVASTATIN CALCIUM 40 MG: 40 TABLET, FILM COATED ORAL at 08:09

## 2025-06-22 RX ADMIN — FOLIC ACID 1 MG: 1 TABLET ORAL at 08:09

## 2025-06-22 RX ADMIN — TIOTROPIUM BROMIDE AND OLODATEROL 2 PUFF: 3.124; 2.736 SPRAY, METERED RESPIRATORY (INHALATION) at 07:59

## 2025-06-22 RX ADMIN — SODIUM CHLORIDE, PRESERVATIVE FREE 10 ML: 5 INJECTION INTRAVENOUS at 08:10

## 2025-06-22 RX ADMIN — AMLODIPINE BESYLATE 10 MG: 10 TABLET ORAL at 08:09

## 2025-06-22 RX ADMIN — Medication 1 TABLET: at 08:09

## 2025-06-22 RX ADMIN — ISOSORBIDE MONONITRATE 30 MG: 30 TABLET, EXTENDED RELEASE ORAL at 08:09

## 2025-06-22 RX ADMIN — SODIUM CHLORIDE, PRESERVATIVE FREE 10 ML: 5 INJECTION INTRAVENOUS at 08:09

## 2025-06-22 RX ADMIN — Medication 100 MG: at 08:09

## 2025-06-22 RX ADMIN — HYDRALAZINE HYDROCHLORIDE 50 MG: 50 TABLET ORAL at 05:43

## 2025-06-22 ASSESSMENT — PAIN SCALES - GENERAL: PAINLEVEL_OUTOF10: 0

## 2025-06-22 NOTE — DISCHARGE INSTR - COC
Continuity of Care Form    Patient Name: Apolinar Posey   :  1970  MRN:  [id number]    Admit date:  2025  Discharge date:  ***    Code Status Order: Full Code   Advance Directives:     Admitting Physician:  No admitting provider for patient encounter.  PCP: Ruthy Vasquez APRN - CNP    Discharging Nurse: ***  Discharging Hospital Unit/Room#: 4A-10/010-A  Discharging Unit Phone Number: ***    Emergency Contact:   Extended Emergency Contact Information  Primary Emergency Contact: Susan Buenrostro   [facility]  Home Phone: [phone]  Mobile Phone: [phone]  Relation: Spouse  Secondary Emergency Contact: Ekta Posey  Home Phone: [phone]  Relation: Parent    Past Surgical History:  Past Surgical History:   Procedure Laterality Date    BRONCHOSCOPY N/A 2022    BRONCHOSCOPY WITH BIOPSY UNDER FLUORO performed by Freedom Flannery MD at [facility]    MANDIBLE FRACTURE SURGERY         Immunization History:   Immunization History   Administered Date(s) Administered    Influenza 2011    TDaP, ADACEL (age 10y-64y), BOOSTRIX (age 10y+), IM, 0.5mL 2017       Active Problems:  Patient Active Problem List   Diagnosis Code    GERD (gastroesophageal reflux disease) K21.9    Hyperlipidemia E78.5    Hypertension I10    Pneumonia J18.9    Empyema (HCC) J86.9    Mass of upper lobe of left lung R91.8    Type 2 diabetes mellitus without complication, without long-term current use of insulin (HCC) E11.9    Hypertensive emergency I16.1    Congestive heart failure (HCC) I50.9    Cardiomyopathy (HCC) I42.9       Isolation/Infection:   Isolation            No Isolation          Patient Infection Status    No active infections.   Resolved       Infection Onset Added Last Indicated Last Indicated By Resolved Resolved By    COVID-19 22 COVID-19, Rapid 22 Daya Bruno, RN    +                         Nurse Assessment:  Last Vital Signs: BP (!)

## 2025-06-22 NOTE — PROGRESS NOTES
Discharge teaching and instructions for diagnosis/procedure of hypertensive emergency completed with patient using teachback method. AVS reviewed. Printed prescriptions given to patient. Patient voiced understanding regarding prescriptions, follow up appointments, and care of self at home. Discharged ambulatory to  home with support per family.  Zoll life vest in place at time of discharge.

## 2025-06-22 NOTE — DISCHARGE INSTRUCTIONS
Follow-up with your PCP in 1 week  Follow-up with Dr. Villatoro, cardiology in 4 weeks    Get repeat blood work, BMP, in 4 days  You will take amlodipine, Imdur and hydralazine

## 2025-06-22 NOTE — DISCHARGE INSTR - DIET

## 2025-06-22 NOTE — PLAN OF CARE
Problem: Chronic Conditions and Co-morbidities  Goal: Patient's chronic conditions and co-morbidity symptoms are monitored and maintained or improved  6/22/2025 1344 by Melony Thayer RN  Outcome: Adequate for Discharge  6/22/2025 1344 by Melony Thayer RN  Outcome: Adequate for Discharge  6/22/2025 0531 by Swati Garcia RN  Outcome: Progressing  Flowsheets (Taken 6/22/2025 0531)  Care Plan - Patient's Chronic Conditions and Co-Morbidity Symptoms are Monitored and Maintained or Improved:   Monitor and assess patient's chronic conditions and comorbid symptoms for stability, deterioration, or improvement   Collaborate with multidisciplinary team to address chronic and comorbid conditions and prevent exacerbation or deterioration   Update acute care plan with appropriate goals if chronic or comorbid symptoms are exacerbated and prevent overall improvement and discharge     Problem: Discharge Planning  Goal: Discharge to home or other facility with appropriate resources  6/22/2025 1344 by Melony Thayer RN  Outcome: Adequate for Discharge  6/22/2025 1344 by Melony Thayer RN  Outcome: Adequate for Discharge  6/22/2025 0531 by Swati Garcia RN  Outcome: Progressing  Flowsheets (Taken 6/22/2025 0531)  Discharge to home or other facility with appropriate resources:   Identify barriers to discharge with patient and caregiver   Arrange for needed discharge resources and transportation as appropriate     Problem: Seizure Precautions  Goal: Remains free of injury related to seizures activity  6/22/2025 1344 by Melony Thayer RN  Outcome: Adequate for Discharge  6/22/2025 1344 by Melony Thayer RN  Outcome: Adequate for Discharge  6/22/2025 0531 by Swati Garcia RN  Outcome: Progressing  Flowsheets (Taken 6/22/2025 0531)  Remains free of injury related to seizure activity:   Maintain airway, patient safety  and administer oxygen as ordered   Monitor patient for seizure activity, document and report duration and

## 2025-06-22 NOTE — PLAN OF CARE
Problem: Chronic Conditions and Co-morbidities  Goal: Patient's chronic conditions and co-morbidity symptoms are monitored and maintained or improved  Outcome: Progressing  Flowsheets (Taken 6/22/2025 0531)  Care Plan - Patient's Chronic Conditions and Co-Morbidity Symptoms are Monitored and Maintained or Improved:   Monitor and assess patient's chronic conditions and comorbid symptoms for stability, deterioration, or improvement   Collaborate with multidisciplinary team to address chronic and comorbid conditions and prevent exacerbation or deterioration   Update acute care plan with appropriate goals if chronic or comorbid symptoms are exacerbated and prevent overall improvement and discharge     Problem: Discharge Planning  Goal: Discharge to home or other facility with appropriate resources  Outcome: Progressing  Flowsheets (Taken 6/22/2025 0531)  Discharge to home or other facility with appropriate resources:   Identify barriers to discharge with patient and caregiver   Arrange for needed discharge resources and transportation as appropriate     Problem: Seizure Precautions  Goal: Remains free of injury related to seizures activity  Outcome: Progressing  Flowsheets (Taken 6/22/2025 0531)  Remains free of injury related to seizure activity:   Maintain airway, patient safety  and administer oxygen as ordered   Monitor patient for seizure activity, document and report duration and description of seizure to Licensed Independent Practitioner     Problem: Pain  Goal: Verbalizes/displays adequate comfort level or baseline comfort level  Outcome: Progressing  Flowsheets (Taken 6/22/2025 0531)  Verbalizes/displays adequate comfort level or baseline comfort level:   Encourage patient to monitor pain and request assistance   Assess pain using appropriate pain scale   Administer analgesics based on type and severity of pain and evaluate response   Consider cultural and social influences on pain and pain management

## 2025-06-22 NOTE — DISCHARGE SUMMARY
Resident Discharge Summary (Hospitalist)      Patient: Apolinar Posey 55 y.o. male  : 1970  MRN: [id number]   Account: [id number]   Patient's PCP: Ruthy Vasquez APRN - CNP    Admit Date: 2025   Discharge Date:   25      Admitting Physician: No admitting provider for patient encounter.  Discharge Physician: Alejandro Gibson MD       Outpatient Follow-up Rec's:  PCP      Hospital Course:   Apolinar Posey is a 55 y.o. male with a history of hypertension, hyperlipidemia, polysubstance abuse, non-insulin-dependent type 2 diabetes mellitus, alcohol use, and nicotine dependence who presented to [facility] with chief complaint of shortness of breath.  On arrival SBP was in the 160s, it got up to the 180s and the patient was slightly tachycardic.  Patient was initially put on nitro drip and subsequently transition to oral medications.  We were holding his home losartan and hydrochlorothiazide which she was not taking prior to presentation due to DONALD on CKD.  Patient will be discharged on amlodipine hydralazine and Imdur.  Follow-up with BMP and PCP who should be able to transition the patient off hydralazine once his kidney function improves.      Discharge Diagnoses:  Hypertensive emergency: Presented SBP 1 70-1 90s associated with pulmonary edema.  Patient has a history of hypertension, noncompliance and cocaine use most recently .  Patient was initially put on nitro drip which was transitioned to oral meds.  Patient will be discharged on hydralazine, Imdur and amlodipine with a follow-up BMP in 1 week and a PCP appointment who can transition the patient off of hydralazine 3 times daily and have providers discretion restart ACE/ARB.    New onset HFrEF: Suspect secondary to cocaine use.  Low suspicion of ACS.  EKG showing left ventricular hypertrophy.  Echo showed EF 25-30% with severe increased wall thickness, global hypokinesis, mild aortic stenosis and moderate aortic

## 2025-06-30 NOTE — ED PROVIDER NOTES
[facility] NEUROSCIENCES 4A      EMERGENCY MEDICINE     Pt Name: Apolinar Posey  MRN: [id number]  Birthdate 1970  Date of evaluation: 6/18/2025  Provider: Lesley Carlin PA-C    CHIEF COMPLAINT       Chief Complaint   Patient presents with    Shortness of Breath     HISTORY OF PRESENT ILLNESS   Apolinar Posey is a pleasant 55 y.o. male who presents to the emergency department from from home, brought in by EMS for evaluation of shortness of breath and chest pain x 2-3 months. Patient states he has been having intermittent dyspnea and chest pain for the last few months that is worsening. Endorsing an orthopnea component as well as exacerbation by cocaine use. Patient denies fever, cough, leg swelling, vomiting, HA, vision change, urinary symptoms.     PASTMEDICAL HISTORY     Past Medical History:   Diagnosis Date    Alcohol abuse     Cocaine abuse ([facility])     GERD (gastroesophageal reflux disease)     Headache(784.0)     HFrEF (heart failure with reduced ejection fraction) ([facility]) 06/2025    25-30% EF    Hyperlipidemia     Hypertension     Renal insufficiency        Patient Active Problem List   Diagnosis Code    GERD (gastroesophageal reflux disease) K21.9    Hyperlipidemia E78.5    Hypertension I10    Pneumonia J18.9    Empyema ([facility]) J86.9    Mass of upper lobe of left lung R91.8    Type 2 diabetes mellitus without complication, without long-term current use of insulin ([facility]) E11.9    Hypertensive emergency I16.1    Congestive heart failure ([facility]) I50.9    Cardiomyopathy ([facility]) I42.9     SURGICAL HISTORY       Past Surgical History:   Procedure Laterality Date    BRONCHOSCOPY N/A 2/9/2022    BRONCHOSCOPY WITH BIOPSY UNDER FLUORO performed by Freedom Flannery MD at [facility]    MANDIBLE FRACTURE SURGERY         CURRENT MEDICATIONS       Discharge Medication List as of 6/22/2025  2:47 PM        CONTINUE these medications which have NOT CHANGED    Details   LISINOPRIL PO Take by mouthHistorical MedPaused since